# Patient Record
Sex: FEMALE | Race: BLACK OR AFRICAN AMERICAN | NOT HISPANIC OR LATINO | URBAN - METROPOLITAN AREA
[De-identification: names, ages, dates, MRNs, and addresses within clinical notes are randomized per-mention and may not be internally consistent; named-entity substitution may affect disease eponyms.]

---

## 2018-04-10 ENCOUNTER — INPATIENT (INPATIENT)
Facility: HOSPITAL | Age: 73
LOS: 2 days | Discharge: HOME | DRG: 247 | End: 2018-04-13
Attending: HOSPITALIST | Admitting: INTERNAL MEDICINE
Payer: COMMERCIAL

## 2018-04-10 ENCOUNTER — EMERGENCY (EMERGENCY)
Facility: HOSPITAL | Age: 73
LOS: 0 days | Discharge: ANOTHER HOSPITAL | End: 2018-04-10
Attending: EMERGENCY MEDICINE
Payer: MEDICARE

## 2018-04-10 VITALS
WEIGHT: 188.05 LBS | HEIGHT: 65 IN | DIASTOLIC BLOOD PRESSURE: 57 MMHG | RESPIRATION RATE: 17 BRPM | TEMPERATURE: 98 F | SYSTOLIC BLOOD PRESSURE: 122 MMHG | HEART RATE: 78 BPM | OXYGEN SATURATION: 99 %

## 2018-04-10 VITALS
SYSTOLIC BLOOD PRESSURE: 134 MMHG | RESPIRATION RATE: 28 BRPM | OXYGEN SATURATION: 100 % | HEART RATE: 72 BPM | DIASTOLIC BLOOD PRESSURE: 72 MMHG

## 2018-04-10 VITALS
TEMPERATURE: 98 F | DIASTOLIC BLOOD PRESSURE: 77 MMHG | OXYGEN SATURATION: 100 % | HEART RATE: 84 BPM | SYSTOLIC BLOOD PRESSURE: 147 MMHG | RESPIRATION RATE: 18 BRPM

## 2018-04-10 DIAGNOSIS — E11.9 TYPE 2 DIABETES MELLITUS WITHOUT COMPLICATIONS: ICD-10-CM

## 2018-04-10 DIAGNOSIS — Z98.891 HISTORY OF UTERINE SCAR FROM PREVIOUS SURGERY: Chronic | ICD-10-CM

## 2018-04-10 DIAGNOSIS — I10 ESSENTIAL (PRIMARY) HYPERTENSION: ICD-10-CM

## 2018-04-10 DIAGNOSIS — I24.9 ACUTE ISCHEMIC HEART DISEASE, UNSPECIFIED: ICD-10-CM

## 2018-04-10 DIAGNOSIS — E78.5 HYPERLIPIDEMIA, UNSPECIFIED: ICD-10-CM

## 2018-04-10 DIAGNOSIS — I25.10 ATHEROSCLEROTIC HEART DISEASE OF NATIVE CORONARY ARTERY WITHOUT ANGINA PECTORIS: ICD-10-CM

## 2018-04-10 DIAGNOSIS — R07.9 CHEST PAIN, UNSPECIFIED: ICD-10-CM

## 2018-04-10 LAB
ALBUMIN SERPL ELPH-MCNC: 3.8 G/DL — SIGNIFICANT CHANGE UP (ref 3.3–5)
ALBUMIN SERPL ELPH-MCNC: 4 G/DL — SIGNIFICANT CHANGE UP (ref 3.3–5)
ALP SERPL-CCNC: 103 U/L — SIGNIFICANT CHANGE UP (ref 40–120)
ALP SERPL-CCNC: 134 U/L — HIGH (ref 40–120)
ALT FLD-CCNC: 18 U/L RC — SIGNIFICANT CHANGE UP (ref 10–45)
ALT FLD-CCNC: 24 U/L — SIGNIFICANT CHANGE UP (ref 12–78)
ANION GAP SERPL CALC-SCNC: 14 MMOL/L — SIGNIFICANT CHANGE UP (ref 5–17)
ANION GAP SERPL CALC-SCNC: 9 MMOL/L — SIGNIFICANT CHANGE UP (ref 5–17)
APTT BLD: 27.8 SEC — SIGNIFICANT CHANGE UP (ref 27.5–37.4)
AST SERPL-CCNC: 31 U/L — SIGNIFICANT CHANGE UP (ref 10–40)
AST SERPL-CCNC: 39 U/L — HIGH (ref 15–37)
BASOPHILS # BLD AUTO: 0.05 K/UL — SIGNIFICANT CHANGE UP (ref 0–0.2)
BASOPHILS NFR BLD AUTO: 0.4 % — SIGNIFICANT CHANGE UP (ref 0–2)
BILIRUB SERPL-MCNC: 0.3 MG/DL — SIGNIFICANT CHANGE UP (ref 0.2–1.2)
BILIRUB SERPL-MCNC: 0.3 MG/DL — SIGNIFICANT CHANGE UP (ref 0.2–1.2)
BUN SERPL-MCNC: 22 MG/DL — SIGNIFICANT CHANGE UP (ref 7–23)
BUN SERPL-MCNC: 24 MG/DL — HIGH (ref 7–23)
CALCIUM SERPL-MCNC: 9.8 MG/DL — SIGNIFICANT CHANGE UP (ref 8.5–10.1)
CALCIUM SERPL-MCNC: 9.9 MG/DL — SIGNIFICANT CHANGE UP (ref 8.4–10.5)
CHLORIDE SERPL-SCNC: 104 MMOL/L — SIGNIFICANT CHANGE UP (ref 96–108)
CHLORIDE SERPL-SCNC: 99 MMOL/L — SIGNIFICANT CHANGE UP (ref 96–108)
CK MB BLD-MCNC: 2.5 % — SIGNIFICANT CHANGE UP (ref 0–3.5)
CK MB CFR SERPL CALC: 9.1 NG/ML — HIGH (ref 0.5–3.6)
CK SERPL-CCNC: 361 U/L — HIGH (ref 26–192)
CO2 SERPL-SCNC: 21 MMOL/L — LOW (ref 22–31)
CO2 SERPL-SCNC: 26 MMOL/L — SIGNIFICANT CHANGE UP (ref 22–31)
CREAT SERPL-MCNC: 0.89 MG/DL — SIGNIFICANT CHANGE UP (ref 0.5–1.3)
CREAT SERPL-MCNC: 1.13 MG/DL — SIGNIFICANT CHANGE UP (ref 0.5–1.3)
EOSINOPHIL # BLD AUTO: 0.06 K/UL — SIGNIFICANT CHANGE UP (ref 0–0.5)
EOSINOPHIL NFR BLD AUTO: 0.5 % — SIGNIFICANT CHANGE UP (ref 0–6)
GLUCOSE BLDC GLUCOMTR-MCNC: 231 MG/DL — HIGH (ref 70–99)
GLUCOSE BLDC GLUCOMTR-MCNC: 238 MG/DL — HIGH (ref 70–99)
GLUCOSE SERPL-MCNC: 268 MG/DL — HIGH (ref 70–99)
GLUCOSE SERPL-MCNC: 278 MG/DL — HIGH (ref 70–99)
HCT VFR BLD CALC: 33.9 % — LOW (ref 34.5–45)
HCT VFR BLD CALC: 38.1 % — SIGNIFICANT CHANGE UP (ref 34.5–45)
HGB BLD-MCNC: 11.1 G/DL — LOW (ref 11.5–15.5)
HGB BLD-MCNC: 12.1 G/DL — SIGNIFICANT CHANGE UP (ref 11.5–15.5)
IMM GRANULOCYTES NFR BLD AUTO: 0.3 % — SIGNIFICANT CHANGE UP (ref 0–1.5)
INR BLD: 0.93 RATIO — SIGNIFICANT CHANGE UP (ref 0.88–1.16)
LIDOCAIN IGE QN: 295 U/L — SIGNIFICANT CHANGE UP (ref 73–393)
LYMPHOCYTES # BLD AUTO: 16.5 % — SIGNIFICANT CHANGE UP (ref 13–44)
LYMPHOCYTES # BLD AUTO: 2.07 K/UL — SIGNIFICANT CHANGE UP (ref 1–3.3)
MAGNESIUM SERPL-MCNC: 1.8 MG/DL — SIGNIFICANT CHANGE UP (ref 1.6–2.6)
MAGNESIUM SERPL-MCNC: 1.9 MG/DL — SIGNIFICANT CHANGE UP (ref 1.6–2.6)
MCHC RBC-ENTMCNC: 26.9 PG — LOW (ref 27–34)
MCHC RBC-ENTMCNC: 28.5 PG — SIGNIFICANT CHANGE UP (ref 27–34)
MCHC RBC-ENTMCNC: 31.8 GM/DL — LOW (ref 32–36)
MCHC RBC-ENTMCNC: 32.7 GM/DL — SIGNIFICANT CHANGE UP (ref 32–36)
MCV RBC AUTO: 84.9 FL — SIGNIFICANT CHANGE UP (ref 80–100)
MCV RBC AUTO: 87.2 FL — SIGNIFICANT CHANGE UP (ref 80–100)
MONOCYTES # BLD AUTO: 0.48 K/UL — SIGNIFICANT CHANGE UP (ref 0–0.9)
MONOCYTES NFR BLD AUTO: 3.8 % — SIGNIFICANT CHANGE UP (ref 2–14)
NEUTROPHILS # BLD AUTO: 9.86 K/UL — HIGH (ref 1.8–7.4)
NEUTROPHILS NFR BLD AUTO: 78.5 % — HIGH (ref 43–77)
NRBC # BLD: 0 /100 WBCS — SIGNIFICANT CHANGE UP (ref 0–0)
NT-PROBNP SERPL-SCNC: 47 PG/ML — SIGNIFICANT CHANGE UP (ref 0–125)
PLATELET # BLD AUTO: 250 K/UL — SIGNIFICANT CHANGE UP (ref 150–400)
PLATELET # BLD AUTO: 319 K/UL — SIGNIFICANT CHANGE UP (ref 150–400)
POTASSIUM SERPL-MCNC: 3.5 MMOL/L — SIGNIFICANT CHANGE UP (ref 3.5–5.3)
POTASSIUM SERPL-MCNC: 4.3 MMOL/L — SIGNIFICANT CHANGE UP (ref 3.5–5.3)
POTASSIUM SERPL-SCNC: 3.5 MMOL/L — SIGNIFICANT CHANGE UP (ref 3.5–5.3)
POTASSIUM SERPL-SCNC: 4.3 MMOL/L — SIGNIFICANT CHANGE UP (ref 3.5–5.3)
PROT SERPL-MCNC: 6.7 G/DL — SIGNIFICANT CHANGE UP (ref 6–8.3)
PROT SERPL-MCNC: 8 GM/DL — SIGNIFICANT CHANGE UP (ref 6–8.3)
PROTHROM AB SERPL-ACNC: 10.1 SEC — SIGNIFICANT CHANGE UP (ref 9.8–12.7)
RBC # BLD: 3.89 M/UL — SIGNIFICANT CHANGE UP (ref 3.8–5.2)
RBC # BLD: 4.49 M/UL — SIGNIFICANT CHANGE UP (ref 3.8–5.2)
RBC # FLD: 13.3 % — SIGNIFICANT CHANGE UP (ref 10.3–14.5)
RBC # FLD: 15 % — HIGH (ref 10.3–14.5)
SODIUM SERPL-SCNC: 134 MMOL/L — LOW (ref 135–145)
SODIUM SERPL-SCNC: 139 MMOL/L — SIGNIFICANT CHANGE UP (ref 135–145)
TROPONIN I SERPL-MCNC: 0.72 NG/ML — HIGH (ref 0.01–0.04)
WBC # BLD: 10.8 K/UL — HIGH (ref 3.8–10.5)
WBC # BLD: 12.56 K/UL — HIGH (ref 3.8–10.5)
WBC # FLD AUTO: 10.8 K/UL — HIGH (ref 3.8–10.5)
WBC # FLD AUTO: 12.56 K/UL — HIGH (ref 3.8–10.5)

## 2018-04-10 PROCEDURE — 99152 MOD SED SAME PHYS/QHP 5/>YRS: CPT

## 2018-04-10 PROCEDURE — 92941 PRQ TRLML REVSC TOT OCCL AMI: CPT | Mod: LC

## 2018-04-10 PROCEDURE — 71045 X-RAY EXAM CHEST 1 VIEW: CPT | Mod: 26,59

## 2018-04-10 PROCEDURE — 93010 ELECTROCARDIOGRAM REPORT: CPT | Mod: 76

## 2018-04-10 PROCEDURE — 93458 L HRT ARTERY/VENTRICLE ANGIO: CPT | Mod: 26,59

## 2018-04-10 PROCEDURE — 99291 CRITICAL CARE FIRST HOUR: CPT

## 2018-04-10 RX ORDER — ATORVASTATIN CALCIUM 80 MG/1
80 TABLET, FILM COATED ORAL AT BEDTIME
Qty: 0 | Refills: 0 | Status: DISCONTINUED | OUTPATIENT
Start: 2018-04-10 | End: 2018-04-13

## 2018-04-10 RX ORDER — ASPIRIN/CALCIUM CARB/MAGNESIUM 324 MG
325 TABLET ORAL ONCE
Qty: 0 | Refills: 0 | Status: COMPLETED | OUTPATIENT
Start: 2018-04-10 | End: 2018-04-10

## 2018-04-10 RX ORDER — CLOPIDOGREL BISULFATE 75 MG/1
300 TABLET, FILM COATED ORAL ONCE
Qty: 0 | Refills: 0 | Status: COMPLETED | OUTPATIENT
Start: 2018-04-10 | End: 2018-04-10

## 2018-04-10 RX ORDER — DEXTROSE 50 % IN WATER 50 %
25 SYRINGE (ML) INTRAVENOUS ONCE
Qty: 0 | Refills: 0 | Status: DISCONTINUED | OUTPATIENT
Start: 2018-04-10 | End: 2018-04-11

## 2018-04-10 RX ORDER — DEXTROSE 50 % IN WATER 50 %
12.5 SYRINGE (ML) INTRAVENOUS ONCE
Qty: 0 | Refills: 0 | Status: DISCONTINUED | OUTPATIENT
Start: 2018-04-10 | End: 2018-04-11

## 2018-04-10 RX ORDER — HEPARIN SODIUM 5000 [USP'U]/ML
INJECTION INTRAVENOUS; SUBCUTANEOUS
Qty: 25000 | Refills: 0 | Status: DISCONTINUED | OUTPATIENT
Start: 2018-04-10 | End: 2018-04-10

## 2018-04-10 RX ORDER — GLUCAGON INJECTION, SOLUTION 0.5 MG/.1ML
1 INJECTION, SOLUTION SUBCUTANEOUS ONCE
Qty: 0 | Refills: 0 | Status: DISCONTINUED | OUTPATIENT
Start: 2018-04-10 | End: 2018-04-11

## 2018-04-10 RX ORDER — HEPARIN SODIUM 5000 [USP'U]/ML
4000 INJECTION INTRAVENOUS; SUBCUTANEOUS ONCE
Qty: 0 | Refills: 0 | Status: COMPLETED | OUTPATIENT
Start: 2018-04-10 | End: 2018-04-10

## 2018-04-10 RX ORDER — INSULIN LISPRO 100/ML
VIAL (ML) SUBCUTANEOUS AT BEDTIME
Qty: 0 | Refills: 0 | Status: DISCONTINUED | OUTPATIENT
Start: 2018-04-10 | End: 2018-04-11

## 2018-04-10 RX ORDER — DEXTROSE 50 % IN WATER 50 %
1 SYRINGE (ML) INTRAVENOUS ONCE
Qty: 0 | Refills: 0 | Status: DISCONTINUED | OUTPATIENT
Start: 2018-04-10 | End: 2018-04-11

## 2018-04-10 RX ORDER — TICAGRELOR 90 MG/1
90 TABLET ORAL
Qty: 0 | Refills: 0 | Status: DISCONTINUED | OUTPATIENT
Start: 2018-04-10 | End: 2018-04-13

## 2018-04-10 RX ORDER — HEPARIN SODIUM 5000 [USP'U]/ML
4000 INJECTION INTRAVENOUS; SUBCUTANEOUS EVERY 6 HOURS
Qty: 0 | Refills: 0 | Status: DISCONTINUED | OUTPATIENT
Start: 2018-04-10 | End: 2018-04-10

## 2018-04-10 RX ORDER — POTASSIUM CHLORIDE 20 MEQ
40 PACKET (EA) ORAL ONCE
Qty: 0 | Refills: 0 | Status: COMPLETED | OUTPATIENT
Start: 2018-04-10 | End: 2018-04-10

## 2018-04-10 RX ORDER — MAGNESIUM SULFATE 500 MG/ML
1 VIAL (ML) INJECTION ONCE
Qty: 0 | Refills: 0 | Status: COMPLETED | OUTPATIENT
Start: 2018-04-10 | End: 2018-04-10

## 2018-04-10 RX ORDER — SODIUM CHLORIDE 9 MG/ML
1000 INJECTION, SOLUTION INTRAVENOUS
Qty: 0 | Refills: 0 | Status: DISCONTINUED | OUTPATIENT
Start: 2018-04-10 | End: 2018-04-11

## 2018-04-10 RX ORDER — ASPIRIN/CALCIUM CARB/MAGNESIUM 324 MG
81 TABLET ORAL DAILY
Qty: 0 | Refills: 0 | Status: DISCONTINUED | OUTPATIENT
Start: 2018-04-10 | End: 2018-04-13

## 2018-04-10 RX ORDER — INSULIN LISPRO 100/ML
VIAL (ML) SUBCUTANEOUS
Qty: 0 | Refills: 0 | Status: DISCONTINUED | OUTPATIENT
Start: 2018-04-10 | End: 2018-04-11

## 2018-04-10 RX ADMIN — Medication 100 GRAM(S): at 23:13

## 2018-04-10 RX ADMIN — CLOPIDOGREL BISULFATE 300 MILLIGRAM(S): 75 TABLET, FILM COATED ORAL at 17:54

## 2018-04-10 RX ADMIN — Medication 40 MILLIEQUIVALENT(S): at 23:13

## 2018-04-10 RX ADMIN — HEPARIN SODIUM 1000 UNIT(S)/HR: 5000 INJECTION INTRAVENOUS; SUBCUTANEOUS at 18:17

## 2018-04-10 RX ADMIN — ATORVASTATIN CALCIUM 80 MILLIGRAM(S): 80 TABLET, FILM COATED ORAL at 23:13

## 2018-04-10 RX ADMIN — HEPARIN SODIUM 4000 UNIT(S): 5000 INJECTION INTRAVENOUS; SUBCUTANEOUS at 18:17

## 2018-04-10 RX ADMIN — Medication 325 MILLIGRAM(S): at 17:55

## 2018-04-10 NOTE — H&P ADULT - NSHPSOCIALHISTORY_GEN_ALL_CORE
Pt lives at home with , no small children living at home, no pets  Denies smoking, drinks alcohol occasionally, denies drug use.

## 2018-04-10 NOTE — H&P ADULT - PMH
Controlled type 2 diabetes mellitus without complication, without long-term current use of insulin    Essential hypertension    Hyperlipidemia associated with type 2 diabetes mellitus

## 2018-04-10 NOTE — H&P ADULT - ASSESSMENT
72F PMH HTN, HLDL, DMII presenting with acute STEMI now s/p PCI and EDWIGE in OM1    # Neuro  - Pt with no acute neuro issues    # Cards  - c/w asa and ticagrelor  - Atorvastatin started  - pt takes atenolol at home, will monitor and add bb  - Pt has history of high bp, currently SBP in 120s.  Pt takes valsartan at home, will add BB or captopril if needed  - Bedrest for now, pt can sit up later tonight.    # Pulm  - No acute issues    # GI  - DASH diet    #   - Voiding well, will monitor UOP    # ID  - No s/s of infection    # Heme  - On DAPT  - Will add SQH   # Endo  - Pt with history of DMII, taking metformin and glimiperide at home  - Will hold oral agents in hospital  - ISS    # DVT ppx  - Will add SQH

## 2018-04-10 NOTE — H&P ADULT - NSHPPHYSICALEXAM_GEN_ALL_CORE
Appearance: Sitting in bed, NAD  HEENT:   Normal oral mucosa, PERRL, EOMI, anicteric sclera  Cardiovascular: RRR, No murmurs, gallops or rubs appreciated  Respiratory: Lungs clear to auscultation bilaterally, no wheezes, crackles appreciated  Gastrointestinal:  Soft, nondistended, nontender, +BS	  Skin: No rashes, eccymosis or cyanosis noted	  Neurologic: AOx3, CNII-XII grossly intact, motor and sensory function grossly intact  Extremities: Moving all extremities equally, 1+ pitting edema bilaterally in LE  Vascular: palpable dp, pt and radial pulses 2+ bilaterally  Psych:  Normal mood and affect, responds to questions appropriately

## 2018-04-10 NOTE — ED PROVIDER NOTE - OBJECTIVE STATEMENT
Pt is a 71 yo lady with a pmhx of HTN, HL, NIDM who presents to the ED with chest pain. It started at 1 PM after she ate, and she had 1 episode of vomiting. Chest pain resolved afterwards, lasted for 1 hour. Now, is 0/10 chest pain. No sob, no numbness or tingling, no cough, no fevers. No hx of dvt/pe in the past. Says she had a stress test in February by her cardiologist as routine screening. Pt says it was reportedly normal, but scheduled for follow up in June.

## 2018-04-10 NOTE — CHART NOTE - NSCHARTNOTEFT_GEN_A_CORE
FEMORAL ANGIOSEAL ASSESSMENT NOTE    Right groin angioseal in place with good hemostatis w/o any bleeding or hematoma.  Pulses in the RIGHT lower extremity are palpable.   Right groin is soft/non tender  Patient denies leg, foot  or chest pain  post 12 lead EKG WNL, no ST or T wave changes noted when compared to pre procedural EKG    Complications: None    Comments: patient is to remain bed rest for the next 2 hours.

## 2018-04-10 NOTE — ED PROVIDER NOTE - INTERPRETATION
st elevation sub millimeter in v1, st depression in V2. twave inversion in avL mild st elevation in avR

## 2018-04-10 NOTE — H&P ADULT - HISTORY OF PRESENT ILLNESS
72F PMH HTN, HLD, DMII presented with chest pain to [facility] earlier today and was transferred the [facility] for cath.  Pt states pain started around 1 pm.  Pt went downstairs to remove laundry from dryer and started to feel pain in chest.  Patient states she came upstairs and pain got worse.  Pain was in the center of her chest and felt like a squeezing sensation.  Patient denies any radiation of the pain at this time.  Patient states she went to lie down upstairs as the pain progressively was getting worse.  After laying down, patient states pain continued to worsen, she had emesis x 2 and called EMS who brought her to [place].    At [place] per report, patient had troponins and STEMI on EKG (reported).  Patient loaded with asa, plavix, heparin and started on hep gtt.  She was transferred to [facility] for cath.  Patient was given brilinta and taken for RFA angio with mild LAD and RCA disease noted and 99% occlusion noted in OM1.  Pt had stent placed in OM1.    After, pt was transferred to CCU for further care.  At time of exam, patient had no further complaints.  Denied cp, f/c/n/v at this time.

## 2018-04-10 NOTE — H&P ADULT - NSHPREVIEWOFSYSTEMS_GEN_ALL_CORE
REVIEW OF SYSTEMS:    CONSTITUTIONAL: No weakness, fevers or chills  EYES/ENT: No visual changes;  No vertigo or throat pain   NECK: No pain or stiffness  RESPIRATORY: No cough, wheezing, hemoptysis; No shortness of breath  CARDIOVASCULAR: No chest pain or palpitations  GASTROINTESTINAL: No abdominal or epigastric pain. No nausea, vomiting, or hematemesis; No diarrhea or constipation.  GENITOURINARY: No dysuria, frequency or hematuria  NEUROLOGICAL: No numbness or weakness  SKIN: No itching, rashes

## 2018-04-10 NOTE — ED PROVIDER NOTE - MEDICAL DECISION MAKING DETAILS
Ddx: ACS, heart score 4  Plan: cbc, cmp, ecg, cxr, pt is smiling, pain free now, cardiology consult reassess, admission vs transfer Ddx: ACS, heart score 4, ECG concerning for STEMI  Plan: cbc, cmp, ecg, cxr, pt is smiling, pain free now, cardiology consult reassess, admission vs transfer Ddx: ACS, heart score 4, ECG concerning for STEMI/ no pleuritic pain or radiation to suggest dissection or pe.  Plan: cbc, cmp, ecg, cxr, pt is smiling, pain free now, cardiology consult reassess, admission vs transfer

## 2018-04-10 NOTE — CHART NOTE - NSCHARTNOTEFT_GEN_A_CORE
pt s/p LHC via RFA, pt w/ angioseal. RN noted superficial oozing from site. RN then noted small hematoma. Pressure held @ site x 5 minutes, groin now slightly ecchymotic but soft, no bleeding, bruising or hematoma noted. Neurovascularly intact. Sandbag applied. Will continue to monitor groin closely     Berenice Avila [facility]/CCU #[id number]

## 2018-04-10 NOTE — ED PROVIDER NOTE - PROGRESS NOTE DETAILS
Patient had elevated troponin and concerning ECG. Pt denies chest pain currently. Pt given aspirin, plavix, and heparin. Transfer accepted by cardiologist Dr. Montez to [place].

## 2018-04-11 LAB
ALBUMIN SERPL ELPH-MCNC: 3.7 G/DL — SIGNIFICANT CHANGE UP (ref 3.3–5)
ALBUMIN SERPL ELPH-MCNC: 3.7 G/DL — SIGNIFICANT CHANGE UP (ref 3.3–5)
ALP SERPL-CCNC: 106 U/L — SIGNIFICANT CHANGE UP (ref 40–120)
ALP SERPL-CCNC: 108 U/L — SIGNIFICANT CHANGE UP (ref 40–120)
ALT FLD-CCNC: 28 U/L RC — SIGNIFICANT CHANGE UP (ref 10–45)
ALT FLD-CCNC: 28 U/L RC — SIGNIFICANT CHANGE UP (ref 10–45)
ANION GAP SERPL CALC-SCNC: 13 MMOL/L — SIGNIFICANT CHANGE UP (ref 5–17)
ANION GAP SERPL CALC-SCNC: 13 MMOL/L — SIGNIFICANT CHANGE UP (ref 5–17)
AST SERPL-CCNC: 145 U/L — HIGH (ref 10–40)
AST SERPL-CCNC: 156 U/L — HIGH (ref 10–40)
BASOPHILS # BLD AUTO: 0 K/UL — SIGNIFICANT CHANGE UP (ref 0–0.2)
BASOPHILS # BLD AUTO: 0.1 K/UL — SIGNIFICANT CHANGE UP (ref 0–0.2)
BASOPHILS NFR BLD AUTO: 0.4 % — SIGNIFICANT CHANGE UP (ref 0–2)
BASOPHILS NFR BLD AUTO: 0.6 % — SIGNIFICANT CHANGE UP (ref 0–2)
BILIRUB SERPL-MCNC: 0.5 MG/DL — SIGNIFICANT CHANGE UP (ref 0.2–1.2)
BILIRUB SERPL-MCNC: 0.6 MG/DL — SIGNIFICANT CHANGE UP (ref 0.2–1.2)
BLD GP AB SCN SERPL QL: NEGATIVE — SIGNIFICANT CHANGE UP
BUN SERPL-MCNC: 15 MG/DL — SIGNIFICANT CHANGE UP (ref 7–23)
BUN SERPL-MCNC: 17 MG/DL — SIGNIFICANT CHANGE UP (ref 7–23)
CALCIUM SERPL-MCNC: 10.3 MG/DL — SIGNIFICANT CHANGE UP (ref 8.4–10.5)
CALCIUM SERPL-MCNC: 10.3 MG/DL — SIGNIFICANT CHANGE UP (ref 8.4–10.5)
CHLORIDE SERPL-SCNC: 103 MMOL/L — SIGNIFICANT CHANGE UP (ref 96–108)
CHLORIDE SERPL-SCNC: 104 MMOL/L — SIGNIFICANT CHANGE UP (ref 96–108)
CHOLEST SERPL-MCNC: 138 MG/DL — SIGNIFICANT CHANGE UP (ref 10–199)
CHOLEST SERPL-MCNC: 161 MG/DL — SIGNIFICANT CHANGE UP (ref 10–199)
CK MB BLD-MCNC: 6.2 % — HIGH (ref 0–3.5)
CK MB BLD-MCNC: 6.7 % — HIGH (ref 0–3.5)
CK MB CFR SERPL CALC: 112.8 NG/ML — HIGH (ref 0–3.8)
CK MB CFR SERPL CALC: 82.9 NG/ML — HIGH (ref 0–3.8)
CK SERPL-CCNC: 1331 U/L — HIGH (ref 25–170)
CK SERPL-CCNC: 1677 U/L — HIGH (ref 25–170)
CO2 SERPL-SCNC: 22 MMOL/L — SIGNIFICANT CHANGE UP (ref 22–31)
CO2 SERPL-SCNC: 22 MMOL/L — SIGNIFICANT CHANGE UP (ref 22–31)
CREAT SERPL-MCNC: 0.78 MG/DL — SIGNIFICANT CHANGE UP (ref 0.5–1.3)
CREAT SERPL-MCNC: 0.85 MG/DL — SIGNIFICANT CHANGE UP (ref 0.5–1.3)
EOSINOPHIL # BLD AUTO: 0 K/UL — SIGNIFICANT CHANGE UP (ref 0–0.5)
EOSINOPHIL # BLD AUTO: 0.1 K/UL — SIGNIFICANT CHANGE UP (ref 0–0.5)
EOSINOPHIL NFR BLD AUTO: 0.2 % — SIGNIFICANT CHANGE UP (ref 0–6)
EOSINOPHIL NFR BLD AUTO: 0.7 % — SIGNIFICANT CHANGE UP (ref 0–6)
GLUCOSE BLDC GLUCOMTR-MCNC: 155 MG/DL — HIGH (ref 70–99)
GLUCOSE BLDC GLUCOMTR-MCNC: 243 MG/DL — HIGH (ref 70–99)
GLUCOSE BLDC GLUCOMTR-MCNC: 247 MG/DL — HIGH (ref 70–99)
GLUCOSE BLDC GLUCOMTR-MCNC: 297 MG/DL — HIGH (ref 70–99)
GLUCOSE SERPL-MCNC: 215 MG/DL — HIGH (ref 70–99)
GLUCOSE SERPL-MCNC: 295 MG/DL — HIGH (ref 70–99)
HBA1C BLD-MCNC: 11.1 % — HIGH (ref 4–5.6)
HCT VFR BLD CALC: 37.7 % — SIGNIFICANT CHANGE UP (ref 34.5–45)
HCT VFR BLD CALC: 38.6 % — SIGNIFICANT CHANGE UP (ref 34.5–45)
HDLC SERPL-MCNC: 44 MG/DL — SIGNIFICANT CHANGE UP (ref 40–125)
HDLC SERPL-MCNC: 46 MG/DL — SIGNIFICANT CHANGE UP (ref 40–125)
HGB BLD-MCNC: 12.1 G/DL — SIGNIFICANT CHANGE UP (ref 11.5–15.5)
HGB BLD-MCNC: 12.4 G/DL — SIGNIFICANT CHANGE UP (ref 11.5–15.5)
LIPID PNL WITH DIRECT LDL SERPL: 83 MG/DL — SIGNIFICANT CHANGE UP
LIPID PNL WITH DIRECT LDL SERPL: 88 MG/DL — SIGNIFICANT CHANGE UP
LYMPHOCYTES # BLD AUTO: 1.7 K/UL — SIGNIFICANT CHANGE UP (ref 1–3.3)
LYMPHOCYTES # BLD AUTO: 1.8 K/UL — SIGNIFICANT CHANGE UP (ref 1–3.3)
LYMPHOCYTES # BLD AUTO: 15 % — SIGNIFICANT CHANGE UP (ref 13–44)
LYMPHOCYTES # BLD AUTO: 17 % — SIGNIFICANT CHANGE UP (ref 13–44)
MAGNESIUM SERPL-MCNC: 1.9 MG/DL — SIGNIFICANT CHANGE UP (ref 1.6–2.6)
MAGNESIUM SERPL-MCNC: 2.1 MG/DL — SIGNIFICANT CHANGE UP (ref 1.6–2.6)
MCHC RBC-ENTMCNC: 27.9 PG — SIGNIFICANT CHANGE UP (ref 27–34)
MCHC RBC-ENTMCNC: 28 PG — SIGNIFICANT CHANGE UP (ref 27–34)
MCHC RBC-ENTMCNC: 32.1 GM/DL — SIGNIFICANT CHANGE UP (ref 32–36)
MCHC RBC-ENTMCNC: 32.1 GM/DL — SIGNIFICANT CHANGE UP (ref 32–36)
MCV RBC AUTO: 86.9 FL — SIGNIFICANT CHANGE UP (ref 80–100)
MCV RBC AUTO: 87.2 FL — SIGNIFICANT CHANGE UP (ref 80–100)
MONOCYTES # BLD AUTO: 0.6 K/UL — SIGNIFICANT CHANGE UP (ref 0–0.9)
MONOCYTES # BLD AUTO: 0.7 K/UL — SIGNIFICANT CHANGE UP (ref 0–0.9)
MONOCYTES NFR BLD AUTO: 5.8 % — SIGNIFICANT CHANGE UP (ref 2–14)
MONOCYTES NFR BLD AUTO: 6.2 % — SIGNIFICANT CHANGE UP (ref 2–14)
NEUTROPHILS # BLD AUTO: 8 K/UL — HIGH (ref 1.8–7.4)
NEUTROPHILS # BLD AUTO: 8.8 K/UL — HIGH (ref 1.8–7.4)
NEUTROPHILS NFR BLD AUTO: 76.5 % — SIGNIFICANT CHANGE UP (ref 43–77)
NEUTROPHILS NFR BLD AUTO: 77.7 % — HIGH (ref 43–77)
PHOSPHATE SERPL-MCNC: 1.8 MG/DL — LOW (ref 2.5–4.5)
PHOSPHATE SERPL-MCNC: 2.9 MG/DL — SIGNIFICANT CHANGE UP (ref 2.5–4.5)
PLATELET # BLD AUTO: 276 K/UL — SIGNIFICANT CHANGE UP (ref 150–400)
PLATELET # BLD AUTO: 282 K/UL — SIGNIFICANT CHANGE UP (ref 150–400)
POTASSIUM SERPL-MCNC: 3.8 MMOL/L — SIGNIFICANT CHANGE UP (ref 3.5–5.3)
POTASSIUM SERPL-MCNC: 4.1 MMOL/L — SIGNIFICANT CHANGE UP (ref 3.5–5.3)
POTASSIUM SERPL-SCNC: 3.8 MMOL/L — SIGNIFICANT CHANGE UP (ref 3.5–5.3)
POTASSIUM SERPL-SCNC: 4.1 MMOL/L — SIGNIFICANT CHANGE UP (ref 3.5–5.3)
PROT SERPL-MCNC: 6.9 G/DL — SIGNIFICANT CHANGE UP (ref 6–8.3)
PROT SERPL-MCNC: 7.1 G/DL — SIGNIFICANT CHANGE UP (ref 6–8.3)
RBC # BLD: 4.34 M/UL — SIGNIFICANT CHANGE UP (ref 3.8–5.2)
RBC # BLD: 4.42 M/UL — SIGNIFICANT CHANGE UP (ref 3.8–5.2)
RBC # FLD: 13.4 % — SIGNIFICANT CHANGE UP (ref 10.3–14.5)
RBC # FLD: 13.4 % — SIGNIFICANT CHANGE UP (ref 10.3–14.5)
RH IG SCN BLD-IMP: NEGATIVE — SIGNIFICANT CHANGE UP
SODIUM SERPL-SCNC: 138 MMOL/L — SIGNIFICANT CHANGE UP (ref 135–145)
SODIUM SERPL-SCNC: 139 MMOL/L — SIGNIFICANT CHANGE UP (ref 135–145)
TOTAL CHOLESTEROL/HDL RATIO MEASUREMENT: 3 RATIO — LOW (ref 3.3–7.1)
TOTAL CHOLESTEROL/HDL RATIO MEASUREMENT: 3.7 RATIO — SIGNIFICANT CHANGE UP (ref 3.3–7.1)
TRIGL SERPL-MCNC: 144 MG/DL — SIGNIFICANT CHANGE UP (ref 10–149)
TRIGL SERPL-MCNC: 47 MG/DL — SIGNIFICANT CHANGE UP (ref 10–149)
TROPONIN T SERPL-MCNC: 3.65 NG/ML — HIGH (ref 0–0.06)
TROPONIN T SERPL-MCNC: 5.04 NG/ML — HIGH (ref 0–0.06)
TSH SERPL-MCNC: 1.78 UIU/ML — SIGNIFICANT CHANGE UP (ref 0.27–4.2)
TSH SERPL-MCNC: 2.43 UIU/ML — SIGNIFICANT CHANGE UP (ref 0.27–4.2)
WBC # BLD: 10.5 K/UL — SIGNIFICANT CHANGE UP (ref 3.8–10.5)
WBC # BLD: 11.4 K/UL — HIGH (ref 3.8–10.5)
WBC # FLD AUTO: 10.5 K/UL — SIGNIFICANT CHANGE UP (ref 3.8–10.5)
WBC # FLD AUTO: 11.4 K/UL — HIGH (ref 3.8–10.5)

## 2018-04-11 PROCEDURE — 99233 SBSQ HOSP IP/OBS HIGH 50: CPT | Mod: GC

## 2018-04-11 PROCEDURE — 93010 ELECTROCARDIOGRAM REPORT: CPT

## 2018-04-11 RX ORDER — INSULIN LISPRO 100/ML
VIAL (ML) SUBCUTANEOUS AT BEDTIME
Qty: 0 | Refills: 0 | Status: DISCONTINUED | OUTPATIENT
Start: 2018-04-11 | End: 2018-04-12

## 2018-04-11 RX ORDER — NYSTATIN CREAM 100000 [USP'U]/G
1 CREAM TOPICAL
Qty: 0 | Refills: 0 | Status: DISCONTINUED | OUTPATIENT
Start: 2018-04-11 | End: 2018-04-13

## 2018-04-11 RX ORDER — DEXTROSE 50 % IN WATER 50 %
1 SYRINGE (ML) INTRAVENOUS ONCE
Qty: 0 | Refills: 0 | Status: DISCONTINUED | OUTPATIENT
Start: 2018-04-11 | End: 2018-04-13

## 2018-04-11 RX ORDER — POTASSIUM PHOSPHATE, MONOBASIC POTASSIUM PHOSPHATE, DIBASIC 236; 224 MG/ML; MG/ML
15 INJECTION, SOLUTION INTRAVENOUS ONCE
Qty: 0 | Refills: 0 | Status: COMPLETED | OUTPATIENT
Start: 2018-04-11 | End: 2018-04-11

## 2018-04-11 RX ORDER — SODIUM CHLORIDE 9 MG/ML
1000 INJECTION, SOLUTION INTRAVENOUS
Qty: 0 | Refills: 0 | Status: DISCONTINUED | OUTPATIENT
Start: 2018-04-11 | End: 2018-04-13

## 2018-04-11 RX ORDER — DEXTROSE 50 % IN WATER 50 %
12.5 SYRINGE (ML) INTRAVENOUS ONCE
Qty: 0 | Refills: 0 | Status: DISCONTINUED | OUTPATIENT
Start: 2018-04-11 | End: 2018-04-13

## 2018-04-11 RX ORDER — DEXTROSE 50 % IN WATER 50 %
25 SYRINGE (ML) INTRAVENOUS ONCE
Qty: 0 | Refills: 0 | Status: DISCONTINUED | OUTPATIENT
Start: 2018-04-11 | End: 2018-04-13

## 2018-04-11 RX ORDER — GLUCAGON INJECTION, SOLUTION 0.5 MG/.1ML
1 INJECTION, SOLUTION SUBCUTANEOUS ONCE
Qty: 0 | Refills: 0 | Status: DISCONTINUED | OUTPATIENT
Start: 2018-04-11 | End: 2018-04-13

## 2018-04-11 RX ORDER — METOPROLOL TARTRATE 50 MG
12.5 TABLET ORAL
Qty: 0 | Refills: 0 | Status: DISCONTINUED | OUTPATIENT
Start: 2018-04-11 | End: 2018-04-13

## 2018-04-11 RX ORDER — INSULIN LISPRO 100/ML
VIAL (ML) SUBCUTANEOUS
Qty: 0 | Refills: 0 | Status: DISCONTINUED | OUTPATIENT
Start: 2018-04-11 | End: 2018-04-12

## 2018-04-11 RX ORDER — FUROSEMIDE 40 MG
20 TABLET ORAL ONCE
Qty: 0 | Refills: 0 | Status: COMPLETED | OUTPATIENT
Start: 2018-04-11 | End: 2018-04-11

## 2018-04-11 RX ORDER — HEPARIN SODIUM 5000 [USP'U]/ML
5000 INJECTION INTRAVENOUS; SUBCUTANEOUS EVERY 8 HOURS
Qty: 0 | Refills: 0 | Status: DISCONTINUED | OUTPATIENT
Start: 2018-04-11 | End: 2018-04-13

## 2018-04-11 RX ORDER — MAGNESIUM SULFATE 500 MG/ML
1 VIAL (ML) INJECTION ONCE
Qty: 0 | Refills: 0 | Status: COMPLETED | OUTPATIENT
Start: 2018-04-11 | End: 2018-04-11

## 2018-04-11 RX ADMIN — Medication 100 GRAM(S): at 13:44

## 2018-04-11 RX ADMIN — Medication 1: at 08:24

## 2018-04-11 RX ADMIN — ATORVASTATIN CALCIUM 80 MILLIGRAM(S): 80 TABLET, FILM COATED ORAL at 21:37

## 2018-04-11 RX ADMIN — Medication 81 MILLIGRAM(S): at 12:32

## 2018-04-11 RX ADMIN — Medication 20 MILLIGRAM(S): at 11:01

## 2018-04-11 RX ADMIN — Medication 2: at 12:32

## 2018-04-11 RX ADMIN — TICAGRELOR 90 MILLIGRAM(S): 90 TABLET ORAL at 05:18

## 2018-04-11 RX ADMIN — NYSTATIN CREAM 1 APPLICATION(S): 100000 CREAM TOPICAL at 17:46

## 2018-04-11 RX ADMIN — HEPARIN SODIUM 5000 UNIT(S): 5000 INJECTION INTRAVENOUS; SUBCUTANEOUS at 21:37

## 2018-04-11 RX ADMIN — Medication 12.5 MILLIGRAM(S): at 17:46

## 2018-04-11 RX ADMIN — TICAGRELOR 90 MILLIGRAM(S): 90 TABLET ORAL at 17:46

## 2018-04-11 RX ADMIN — Medication 1: at 21:44

## 2018-04-11 RX ADMIN — Medication 2: at 17:34

## 2018-04-11 RX ADMIN — POTASSIUM PHOSPHATE, MONOBASIC POTASSIUM PHOSPHATE, DIBASIC 62.5 MILLIMOLE(S): 236; 224 INJECTION, SOLUTION INTRAVENOUS at 05:18

## 2018-04-11 NOTE — PROGRESS NOTE ADULT - ATTENDING COMMENTS
Patient seen and examined. Agree with assessment and plan as outlined above. 73 yo F with OM1 infarct s/p PCI with EDWIGE to OM1 with EF~40%. Patient on aspirin, brilinta, atorvastatin. Start low dose metoprolol. Lasix 20 mg IV x 1 for LVEDP=30 mmHg. Continue CCU care.

## 2018-04-11 NOTE — CHART NOTE - NSCHARTNOTEFT_GEN_A_CORE
CCU Transfer Note    Transfer from: CCU    Transfer to: (  ) Medicine    ( x ) Telemetry     (   ) RCU        (    ) Palliative         (   ) Stroke Unit       (  ) MICU   (   ) __________________    Accepting Physician: Dr. Mathis    CCU COURSE:    72F PMH HTN, HLD, DMII presented with chest pain to [facility] on 4/10/18 and was transferred the [facility] for cath.  Pt stated pain started around 1 pm on 4/10.  Pt went downstairs to remove laundry from dryer and started to feel pain in chest.  Patient stated she came upstairs and pain got worse.  Pain was in the center of her chest and felt like a squeezing sensation.  Patient denied any radiation of the pain at this time.  Patient stated she went to lie down upstairs as the pain progressively was getting worse.  After laying down, patient stated pain continued to worsen, she had emesis x 2 and called EMS who brought her to [place].    At [place] per report, patient had troponins and STEMI on EKG (reported).  Patient loaded with asa, plavix, heparin and started on hep gtt.  She was transferred to [facility] for cath.  Patient was given brilinta and taken for RFA angio with mild LAD and RCA disease noted and 99% occlusion noted in OM1.  Pt had stent placed in OM1.    After, pt was transferred to CCU for further care.  Patient recovered in CCU on 4/11.  Patient was started on metoprolol on 4/11 and given one dose of lasix.  Patient remained HD stable and was appropriate for transfer later on 4/11.    ASSESSMENT & PLAN:     72F PMH HTN, HLDL, DMII presenting with acute STEMI now s/p PCI and EDWIGE in OM1 now no longer with chest pain, started on ASA, Plavix, metoprolol.      - c/w ASA 81 and ticagrelor 90 BID until follow up with cardiology outpatient.  - Atorvastatin 80 mg qHS added  - now started on low dose beta-blocker, metoprolol.  Should continue.  - Patient will need to restart home DM meds on discharge.  - Patient will need TTE prior to discharge      FOR FOLLOW UP:  [ ] f/u TTE  [ ] f/u with primary care physician  [ ] referral to cardiologist.      CCU [id number]

## 2018-04-11 NOTE — PROGRESS NOTE ADULT - SUBJECTIVE AND OBJECTIVE BOX
CONTACT INFO:  Shila Jasmine MD, PhD  PGY-1| Internal Medicine  [phone] / [id number]    HPI / INTERVAL HISTORY:  Patient seen and examined at bedside.  Denies further pain or emesis today.  Pt stats she feels better.    REVIEW OF SYSTEMS:    CONSTITUTIONAL: No weakness, fevers or chills  EYES/ENT: No visual changes;  No vertigo or throat pain   NECK: No pain or stiffness  RESPIRATORY: No cough, wheezing, hemoptysis; No shortness of breath  CARDIOVASCULAR: No chest pain or palpitations  GASTROINTESTINAL: No abdominal or epigastric pain. No nausea, vomiting, or hematemesis; No diarrhea or constipation. No melena or hematochezia.  GENITOURINARY: No dysuria, frequency or hematuria  NEUROLOGICAL: No numbness or weakness  SKIN: No itching, rashes      OBJECTIVE:  VITAL SIGNS:  ICU Vital Signs Last 24 Hrs  T(C): 36.8 (11 Apr 2018 07:00), Max: 36.8 (10 Apr 2018 23:00)  T(F): 98.2 (11 Apr 2018 07:00), Max: 98.2 (10 Apr 2018 23:00)  HR: 62 (11 Apr 2018 08:00) (58 - 72)  BP: 98/59 (11 Apr 2018 08:00) (94/51 - 134/72)  BP(mean): 69 (11 Apr 2018 08:00) (65 - 93)  ABP: --  ABP(mean): --  RR: 22 (11 Apr 2018 08:00) (18 - 35)  SpO2: 97% (11 Apr 2018 08:00) (96% - 100%)        04-10 @ 07:01  -  04-11 @ 07:00  --------------------------------------------------------  IN: 465 mL / OUT: 950 mL / NET: -485 mL      CAPILLARY BLOOD GLUCOSE      POCT Blood Glucose.: 155 mg/dL (11 Apr 2018 08:11)      PHYSICAL EXAM:  Gen: Laying in bed, AND  HEENT: NC/AT; PERRL, anicteric sclera  Neck: supple  Resp: clear to ausculation B/L; no wheezes, rales or rhonchi  Cardiovasc: S1S2 normal; RRR; no murmurs, rubs or gallops  GI: soft, nondistended, nontender; +BS  Extr: warm, well-perfused, PT/DP pulses 2+ B/L; no LE edema  Skin: normal color and turgor  Neuro: AOx3    LABS:                        12.1   11.4  )-----------( 282      ( 11 Apr 2018 02:52 )             37.7     04-11    138  |  103  |  17  ----------------------------<  215<H>  3.8   |  22  |  0.85    Ca    10.3      11 Apr 2018 02:52  Phos  1.8     04-11  Mg     2.1     04-11    TPro  7.1  /  Alb  3.7  /  TBili  0.5  /  DBili  x   /  AST  156<H>  /  ALT  28  /  AlkPhos  108  04-11    LIVER FUNCTIONS - ( 11 Apr 2018 02:52 )  Alb: 3.7 g/dL / Pro: 7.1 g/dL / ALK PHOS: 108 U/L / ALT: 28 U/L RC / AST: 156 U/L / GGT: x               CARDIAC MARKERS ( 11 Apr 2018 02:52 )  x     / 5.04 ng/mL / 1677 U/L / x     / 112.8 ng/mL          RADIOLOGY & ADDITIONAL TESTS:       MEDICATIONS:  aspirin enteric coated 81 milliGRAM(s) Oral daily  atorvastatin 80 milliGRAM(s) Oral at bedtime  dextrose 5%. 1000 milliLiter(s) IV Continuous <Continuous>  dextrose 50% Injectable 12.5 Gram(s) IV Push once  dextrose 50% Injectable 25 Gram(s) IV Push once  dextrose 50% Injectable 25 Gram(s) IV Push once  dextrose Gel 1 Dose(s) Oral once PRN  glucagon  Injectable 1 milliGRAM(s) IntraMuscular once PRN  heparin  Injectable 5000 Unit(s) SubCutaneous every 8 hours  insulin lispro (HumaLOG) corrective regimen sliding scale   SubCutaneous three times a day before meals  insulin lispro (HumaLOG) corrective regimen sliding scale   SubCutaneous at bedtime  nystatin Powder 1 Application(s) Topical two times a day  ticagrelor 90 milliGRAM(s) Oral two times a day      ALLERGIES:  No Known Allergies

## 2018-04-12 ENCOUNTER — TRANSCRIPTION ENCOUNTER (OUTPATIENT)
Age: 73
End: 2018-04-12

## 2018-04-12 DIAGNOSIS — I21.29 ST ELEVATION (STEMI) MYOCARDIAL INFARCTION INVOLVING OTHER SITES: ICD-10-CM

## 2018-04-12 DIAGNOSIS — Z29.9 ENCOUNTER FOR PROPHYLACTIC MEASURES, UNSPECIFIED: ICD-10-CM

## 2018-04-12 DIAGNOSIS — E11.65 TYPE 2 DIABETES MELLITUS WITH HYPERGLYCEMIA: ICD-10-CM

## 2018-04-12 DIAGNOSIS — I10 ESSENTIAL (PRIMARY) HYPERTENSION: ICD-10-CM

## 2018-04-12 DIAGNOSIS — E78.5 HYPERLIPIDEMIA, UNSPECIFIED: ICD-10-CM

## 2018-04-12 LAB
ANION GAP SERPL CALC-SCNC: 14 MMOL/L — SIGNIFICANT CHANGE UP (ref 5–17)
BASOPHILS # BLD AUTO: 0.1 K/UL — SIGNIFICANT CHANGE UP (ref 0–0.2)
BASOPHILS NFR BLD AUTO: 0.5 % — SIGNIFICANT CHANGE UP (ref 0–2)
BUN SERPL-MCNC: 16 MG/DL — SIGNIFICANT CHANGE UP (ref 7–23)
CALCIUM SERPL-MCNC: 10.4 MG/DL — SIGNIFICANT CHANGE UP (ref 8.4–10.5)
CHLORIDE SERPL-SCNC: 101 MMOL/L — SIGNIFICANT CHANGE UP (ref 96–108)
CO2 SERPL-SCNC: 22 MMOL/L — SIGNIFICANT CHANGE UP (ref 22–31)
CREAT SERPL-MCNC: 1.07 MG/DL — SIGNIFICANT CHANGE UP (ref 0.5–1.3)
EOSINOPHIL # BLD AUTO: 0.1 K/UL — SIGNIFICANT CHANGE UP (ref 0–0.5)
EOSINOPHIL NFR BLD AUTO: 0.5 % — SIGNIFICANT CHANGE UP (ref 0–6)
GLUCOSE BLDC GLUCOMTR-MCNC: 216 MG/DL — HIGH (ref 70–99)
GLUCOSE BLDC GLUCOMTR-MCNC: 287 MG/DL — HIGH (ref 70–99)
GLUCOSE BLDC GLUCOMTR-MCNC: 291 MG/DL — HIGH (ref 70–99)
GLUCOSE BLDC GLUCOMTR-MCNC: 348 MG/DL — HIGH (ref 70–99)
GLUCOSE SERPL-MCNC: 245 MG/DL — HIGH (ref 70–99)
HCT VFR BLD CALC: 40.4 % — SIGNIFICANT CHANGE UP (ref 34.5–45)
HGB BLD-MCNC: 13 G/DL — SIGNIFICANT CHANGE UP (ref 11.5–15.5)
LYMPHOCYTES # BLD AUTO: 2.5 K/UL — SIGNIFICANT CHANGE UP (ref 1–3.3)
LYMPHOCYTES # BLD AUTO: 21.6 % — SIGNIFICANT CHANGE UP (ref 13–44)
MAGNESIUM SERPL-MCNC: 2.2 MG/DL — SIGNIFICANT CHANGE UP (ref 1.6–2.6)
MCHC RBC-ENTMCNC: 28 PG — SIGNIFICANT CHANGE UP (ref 27–34)
MCHC RBC-ENTMCNC: 32.1 GM/DL — SIGNIFICANT CHANGE UP (ref 32–36)
MCV RBC AUTO: 87.3 FL — SIGNIFICANT CHANGE UP (ref 80–100)
MONOCYTES # BLD AUTO: 1 K/UL — HIGH (ref 0–0.9)
MONOCYTES NFR BLD AUTO: 9 % — SIGNIFICANT CHANGE UP (ref 2–14)
NEUTROPHILS # BLD AUTO: 7.9 K/UL — HIGH (ref 1.8–7.4)
NEUTROPHILS NFR BLD AUTO: 68.4 % — SIGNIFICANT CHANGE UP (ref 43–77)
PHOSPHATE SERPL-MCNC: 2.5 MG/DL — SIGNIFICANT CHANGE UP (ref 2.5–4.5)
PLATELET # BLD AUTO: 290 K/UL — SIGNIFICANT CHANGE UP (ref 150–400)
POTASSIUM SERPL-MCNC: 4.3 MMOL/L — SIGNIFICANT CHANGE UP (ref 3.5–5.3)
POTASSIUM SERPL-SCNC: 4.3 MMOL/L — SIGNIFICANT CHANGE UP (ref 3.5–5.3)
RBC # BLD: 4.63 M/UL — SIGNIFICANT CHANGE UP (ref 3.8–5.2)
RBC # FLD: 13.6 % — SIGNIFICANT CHANGE UP (ref 10.3–14.5)
SODIUM SERPL-SCNC: 137 MMOL/L — SIGNIFICANT CHANGE UP (ref 135–145)
WBC # BLD: 11.5 K/UL — HIGH (ref 3.8–10.5)
WBC # FLD AUTO: 11.5 K/UL — HIGH (ref 3.8–10.5)

## 2018-04-12 PROCEDURE — 99233 SBSQ HOSP IP/OBS HIGH 50: CPT

## 2018-04-12 PROCEDURE — 99222 1ST HOSP IP/OBS MODERATE 55: CPT

## 2018-04-12 RX ORDER — INSULIN LISPRO 100/ML
VIAL (ML) SUBCUTANEOUS AT BEDTIME
Qty: 0 | Refills: 0 | Status: DISCONTINUED | OUTPATIENT
Start: 2018-04-12 | End: 2018-04-13

## 2018-04-12 RX ORDER — INSULIN GLARGINE 100 [IU]/ML
15 INJECTION, SOLUTION SUBCUTANEOUS AT BEDTIME
Qty: 0 | Refills: 0 | Status: DISCONTINUED | OUTPATIENT
Start: 2018-04-12 | End: 2018-04-13

## 2018-04-12 RX ORDER — INSULIN LISPRO 100/ML
2 VIAL (ML) SUBCUTANEOUS
Qty: 0 | Refills: 0 | Status: DISCONTINUED | OUTPATIENT
Start: 2018-04-12 | End: 2018-04-12

## 2018-04-12 RX ORDER — LISINOPRIL 2.5 MG/1
2.5 TABLET ORAL DAILY
Qty: 0 | Refills: 0 | Status: DISCONTINUED | OUTPATIENT
Start: 2018-04-12 | End: 2018-04-13

## 2018-04-12 RX ORDER — INSULIN LISPRO 100/ML
4 VIAL (ML) SUBCUTANEOUS
Qty: 0 | Refills: 0 | Status: DISCONTINUED | OUTPATIENT
Start: 2018-04-12 | End: 2018-04-13

## 2018-04-12 RX ORDER — INSULIN LISPRO 100/ML
VIAL (ML) SUBCUTANEOUS
Qty: 0 | Refills: 0 | Status: DISCONTINUED | OUTPATIENT
Start: 2018-04-12 | End: 2018-04-13

## 2018-04-12 RX ORDER — INSULIN GLARGINE 100 [IU]/ML
10 INJECTION, SOLUTION SUBCUTANEOUS AT BEDTIME
Qty: 0 | Refills: 0 | Status: DISCONTINUED | OUTPATIENT
Start: 2018-04-12 | End: 2018-04-12

## 2018-04-12 RX ADMIN — NYSTATIN CREAM 1 APPLICATION(S): 100000 CREAM TOPICAL at 05:36

## 2018-04-12 RX ADMIN — Medication 12.5 MILLIGRAM(S): at 05:37

## 2018-04-12 RX ADMIN — ATORVASTATIN CALCIUM 80 MILLIGRAM(S): 80 TABLET, FILM COATED ORAL at 21:20

## 2018-04-12 RX ADMIN — Medication 4 UNIT(S): at 18:35

## 2018-04-12 RX ADMIN — Medication 6: at 13:22

## 2018-04-12 RX ADMIN — Medication 81 MILLIGRAM(S): at 09:18

## 2018-04-12 RX ADMIN — HEPARIN SODIUM 5000 UNIT(S): 5000 INJECTION INTRAVENOUS; SUBCUTANEOUS at 13:22

## 2018-04-12 RX ADMIN — Medication 12.5 MILLIGRAM(S): at 18:35

## 2018-04-12 RX ADMIN — INSULIN GLARGINE 15 UNIT(S): 100 INJECTION, SOLUTION SUBCUTANEOUS at 22:23

## 2018-04-12 RX ADMIN — TICAGRELOR 90 MILLIGRAM(S): 90 TABLET ORAL at 05:36

## 2018-04-12 RX ADMIN — Medication 2: at 22:23

## 2018-04-12 RX ADMIN — Medication 3: at 18:34

## 2018-04-12 RX ADMIN — HEPARIN SODIUM 5000 UNIT(S): 5000 INJECTION INTRAVENOUS; SUBCUTANEOUS at 05:37

## 2018-04-12 RX ADMIN — LISINOPRIL 2.5 MILLIGRAM(S): 2.5 TABLET ORAL at 13:21

## 2018-04-12 RX ADMIN — HEPARIN SODIUM 5000 UNIT(S): 5000 INJECTION INTRAVENOUS; SUBCUTANEOUS at 21:20

## 2018-04-12 RX ADMIN — NYSTATIN CREAM 1 APPLICATION(S): 100000 CREAM TOPICAL at 19:12

## 2018-04-12 RX ADMIN — Medication 4: at 09:17

## 2018-04-12 RX ADMIN — TICAGRELOR 90 MILLIGRAM(S): 90 TABLET ORAL at 18:35

## 2018-04-12 NOTE — DISCHARGE NOTE ADULT - CARE PROVIDER_API CALL
Timur Joe), Cardiovascular Disease; Internal Medicine  [address] [place] [address]  Phone: [phone]  Fax: [phone]    Alfredito Marquez  Endocrinology  Phone: (   )    -  Fax: (   )    -

## 2018-04-12 NOTE — DISCHARGE NOTE ADULT - HOSPITAL COURSE
72 female with PMH of HTN, HLD, [id number], presented to OSH with c/o chest pain, found to have STEMI, transferred to CCU at [facility] for LHC.  Patient loaded with ASA/Plavix and started on heparin gtt. Underwent cardiac cath on 4/10 with 99% occlusion to OM1 s/p EDWIGE.  Patient was started on metoprolol, ASA, Brilinta, and atorvastatin.  Patient remained HD stable and was transferred to [facility] on 4/11.  Lisinopril was started on 4/11.  Echocardiogram showed……………………….  Patient seen by Endocrinology for uncontrolled diabetes – A1c 11.1.  It was discovered that she was incorrectly using her Trulicity pen and not receiving any medication.  Patient instructed on how to use pen by the endocrinologist. 72 female with PMH of HTN, HLD, [id number], presented to OSH with c/o chest pain, found to have STEMI, transferred to CCU at [facility] for LHC.  Patient loaded with ASA/Plavix and started on heparin gtt. Underwent cardiac cath on 4/10 with 99% occlusion to OM1 s/p EDWIGE.  Patient was started on metoprolol, ASA, Brilinta, and atorvastatin.  Patient remained HD stable and was transferred to [facility] on 4/11.  Lisinopril was started on 4/11.  Echocardiogram showed……………………….  Patient seen by Endocrinology for uncontrolled diabetes – A1c 11.1.  It was discovered that she was incorrectly using her Trulicity pen and not receiving any medication.  Patient instructed on how to use pen by the endocrinologist. Discharged home with cardiology, Endocrine and PMD follow up. 72 female with PMH of HTN, HLD, [id number], presented to OSH with c/o chest pain, found to have STEMI, transferred to CCU at [facility] for LHC.  Patient loaded with ASA/Plavix and started on heparin gtt. Underwent cardiac cath on 4/10 with 99% occlusion to OM1 s/p EDWIGE.  Patient was started on metoprolol, ASA, Brilinta, and atorvastatin.  Patient remained HD stable and was transferred to [facility] on 4/11.  Lisinopril was started on 4/11.  Echocardiogram showed…EF 50%.  Patient seen by Endocrinology for uncontrolled diabetes – A1c 11.1.  It was discovered that she was using her Trulicity pen and not receiving any medication.  Patient instructed on how to use pen by the endocrinologist. Discharged home with cardiology, Endocrine and PMD follow up.

## 2018-04-12 NOTE — CONSULT NOTE ADULT - ASSESSMENT
71 yo female here s/p STEMI, with endocrine consulted for DM2 after A1c was obtained and found to be 11.1.  She is on metformin and 2 other orals she thinks (although she can not tell me names) but additionally just started trulicity 1 month ago (although later found to be injecting incorrectly).  I took a trulicity demo pen and instructed her how to use; discovered that she was pressing the green button onto her skin and discharging the medication into the air (not getting any of it).

## 2018-04-12 NOTE — DISCHARGE NOTE ADULT - OTHER SIGNIFICANT FINDINGS
< from: Cardiac Cath Lab - Adult (04.10.18 @ 18:43) >  VENTRICLES: Analysis of regional contractile function demonstrated severe  apical hypokinesis and severe diaphragmatic hypokinesis. EF estimated was  40 %.  CORONARY VESSELS: The coronary circulation is right dominant.  LM:   --  LM: Angiography showed minor luminal irregularities with no flow  limiting lesions.  LAD:   --  LAD: Angiography showed mild atherosclerosis with no flow  limiting lesions.  CX:   --  Circumflex: Angiography showed mild atherosclerosis with no flow  limiting lesions.  --  OM1: There was a 99 % stenosis.  RCA:   --  RCA: Angiography showed mild atherosclerosis with no flow  limiting lesions.  COMPLICATIONS: There were no complications.  SUMMARY:  CORONARY VESSELS: LM: Angiography showed minor luminal irregularities with  no flow limiting lesions. LAD: Angiography showed mild atherosclerosis  with no flow limiting lesions. OM1: Therewas a 99 % stenosis. RCA:  Angiography showed mild atherosclerosis with no flow limiting lesions.  CARDIAC STRUCTURES: Analysis of regional contractile function demonstrated  severe apical hypokinesis and severe diaphragmatic hypokinesis. EF  estimated was 40 %.  1ST LESION INTERVENTIONS: A percutaneous intervention was performed on the  99 % lesion in the 1st obtuse marginal. Following intervention there was  an excellent angiographic appearance with a 1 % residual stenosis.  DIAGNOSTIC RECOMMENDATIONS: PCI OM for NSTEMI.  INTERVENTIONAL RECOMMENDATIONS: Load brilinta, aspirin, statin, beta  blocker and ACEI as BP allows.    < end of copied text >

## 2018-04-12 NOTE — DISCHARGE NOTE ADULT - SECONDARY DIAGNOSIS.
Uncontrolled type 2 diabetes mellitus with hyperglycemia, without long-term current use of insulin Essential hypertension

## 2018-04-12 NOTE — DISCHARGE NOTE ADULT - ADDITIONAL INSTRUCTIONS
Follow up with cardiologist in 1 week  Follow up with Endocrinologist in 1 week Follow up with cardiologist in 1 week ( call  ) to make an appointment  Follow up with Endocrinologist in 1 week  Follow up with PMD Follow up with cardiologist in 1 week ( call  ) to make an appointment  Follow up with Endocrinologist in 1 week  Follow up with PMD.

## 2018-04-12 NOTE — PROGRESS NOTE ADULT - SUBJECTIVE AND OBJECTIVE BOX
Patient is a 72y old  Female who presents with a chief complaint of Chest pain (10 Apr 2018 21:48)        SUBJECTIVE / OVERNIGHT EVENTS:       MEDICATIONS  (STANDING):  aspirin enteric coated 81 milliGRAM(s) Oral daily  atorvastatin 80 milliGRAM(s) Oral at bedtime  dextrose 5%. 1000 milliLiter(s) (50 mL/Hr) IV Continuous <Continuous>  dextrose 50% Injectable 12.5 Gram(s) IV Push once  dextrose 50% Injectable 25 Gram(s) IV Push once  dextrose 50% Injectable 25 Gram(s) IV Push once  heparin  Injectable 5000 Unit(s) SubCutaneous every 8 hours  insulin lispro (HumaLOG) corrective regimen sliding scale   SubCutaneous three times a day before meals  insulin lispro (HumaLOG) corrective regimen sliding scale   SubCutaneous at bedtime  metoprolol tartrate 12.5 milliGRAM(s) Oral two times a day  nystatin Powder 1 Application(s) Topical two times a day  ticagrelor 90 milliGRAM(s) Oral two times a day    MEDICATIONS  (PRN):  dextrose Gel 1 Dose(s) Oral once PRN Blood Glucose LESS THAN 70 milliGRAM(s)/deciliter  glucagon  Injectable 1 milliGRAM(s) IntraMuscular once PRN Glucose LESS THAN 70 milligrams/deciliter      Vital Signs Last 24 Hrs  T(C): 37.4 (12 Apr 2018 12:16), Max: 37.4 (11 Apr 2018 19:00)  T(F): 99.4 (12 Apr 2018 12:16), Max: 99.4 (12 Apr 2018 12:16)  HR: 73 (12 Apr 2018 12:16) (64 - 83)  BP: 106/63 (12 Apr 2018 12:16) (93/60 - 191/55)  BP(mean): 68 (11 Apr 2018 23:00) (65 - 90)  RR: 17 (12 Apr 2018 12:16) (17 - 25)  SpO2: 96% (12 Apr 2018 12:16) (96% - 99%)  CAPILLARY BLOOD GLUCOSE      POCT Blood Glucose.: 216 mg/dL (12 Apr 2018 08:36)  POCT Blood Glucose.: 297 mg/dL (11 Apr 2018 21:41)  POCT Blood Glucose.: 243 mg/dL (11 Apr 2018 17:23)    I&O's Summary    11 Apr 2018 07:01  -  12 Apr 2018 07:00  --------------------------------------------------------  IN: 240 mL / OUT: 500 mL / NET: -260 mL    12 Apr 2018 07:01  -  12 Apr 2018 12:20  --------------------------------------------------------  IN: 360 mL / OUT: 0 mL / NET: 360 mL        PHYSICAL EXAM:  GENERAL: NAD  HEAD:  Atraumatic, Normocephalic  EYES: conjunctiva and sclera clear  NECK: No JVD  CHEST/LUNG: CTA b/l  HEART: S1 S2 RRR  ABDOMEN: +BS Soft, NT/ND  EXTREMITIES:  2+ DP Pulses, No c/c/e  NEUROLOGY: AAOx3, no focal deficits   SKIN: No rashes or lesions    LABS:                        13.0   11.5  )-----------( 290      ( 12 Apr 2018 06:47 )             40.4     04-12    137  |  101  |  16  ----------------------------<  245<H>  4.3   |  22  |  1.07    Ca    10.4      12 Apr 2018 06:47  Phos  2.5     04-12  Mg     2.2     04-12    TPro  6.9  /  Alb  3.7  /  TBili  0.6  /  DBili  x   /  AST  145<H>  /  ALT  28  /  AlkPhos  106  04-11    PT/INR - ( 10 Apr 2018 17:11 )   PT: 10.1 sec;   INR: 0.93 ratio         PTT - ( 10 Apr 2018 17:11 )  PTT:27.8 sec  CARDIAC MARKERS ( 11 Apr 2018 11:19 )  x     / 3.65 ng/mL / 1331 U/L / x     / 82.9 ng/mL  CARDIAC MARKERS ( 11 Apr 2018 02:52 )  x     / 5.04 ng/mL / 1677 U/L / x     / 112.8 ng/mL  CARDIAC MARKERS ( 10 Apr 2018 17:11 )  .723 ng/mL / x     / 361 U/L / x     / 9.1 ng/mL          RADIOLOGY & ADDITIONAL TESTS:    Imaging Personally Reviewed:  Consultant(s) Notes Reviewed:    Care Discussed with Consultants/Other Providers: Patient is a 72y old  Female who presents with a chief complaint of Chest pain (10 Apr 2018 21:48)        SUBJECTIVE / OVERNIGHT EVENTS: no acute complaints - "feels good"      MEDICATIONS  (STANDING):  aspirin enteric coated 81 milliGRAM(s) Oral daily  atorvastatin 80 milliGRAM(s) Oral at bedtime  dextrose 5%. 1000 milliLiter(s) (50 mL/Hr) IV Continuous <Continuous>  dextrose 50% Injectable 12.5 Gram(s) IV Push once  dextrose 50% Injectable 25 Gram(s) IV Push once  dextrose 50% Injectable 25 Gram(s) IV Push once  heparin  Injectable 5000 Unit(s) SubCutaneous every 8 hours  insulin lispro (HumaLOG) corrective regimen sliding scale   SubCutaneous three times a day before meals  insulin lispro (HumaLOG) corrective regimen sliding scale   SubCutaneous at bedtime  metoprolol tartrate 12.5 milliGRAM(s) Oral two times a day  nystatin Powder 1 Application(s) Topical two times a day  ticagrelor 90 milliGRAM(s) Oral two times a day    MEDICATIONS  (PRN):  dextrose Gel 1 Dose(s) Oral once PRN Blood Glucose LESS THAN 70 milliGRAM(s)/deciliter  glucagon  Injectable 1 milliGRAM(s) IntraMuscular once PRN Glucose LESS THAN 70 milligrams/deciliter      Vital Signs Last 24 Hrs  T(C): 37.4 (12 Apr 2018 12:16), Max: 37.4 (11 Apr 2018 19:00)  T(F): 99.4 (12 Apr 2018 12:16), Max: 99.4 (12 Apr 2018 12:16)  HR: 73 (12 Apr 2018 12:16) (64 - 83)  BP: 106/63 (12 Apr 2018 12:16) (93/60 - 191/55)  BP(mean): 68 (11 Apr 2018 23:00) (65 - 90)  RR: 17 (12 Apr 2018 12:16) (17 - 25)  SpO2: 96% (12 Apr 2018 12:16) (96% - 99%)  CAPILLARY BLOOD GLUCOSE      POCT Blood Glucose.: 216 mg/dL (12 Apr 2018 08:36)  POCT Blood Glucose.: 297 mg/dL (11 Apr 2018 21:41)  POCT Blood Glucose.: 243 mg/dL (11 Apr 2018 17:23)    I&O's Summary    11 Apr 2018 07:01  -  12 Apr 2018 07:00  --------------------------------------------------------  IN: 240 mL / OUT: 500 mL / NET: -260 mL    12 Apr 2018 07:01  -  12 Apr 2018 12:20  --------------------------------------------------------  IN: 360 mL / OUT: 0 mL / NET: 360 mL        PHYSICAL EXAM:  GENERAL: NAD  HEAD:  Atraumatic, Normocephalic  EYES: conjunctiva and sclera clear  NECK: No JVD  CHEST/LUNG: CTA b/l  HEART: S1 S2 RRR  ABDOMEN: +BS Soft, NT/ND  EXTREMITIES:  2+ DP Pulses, No c/c/e  NEUROLOGY: AAOx3, no focal deficits   SKIN: No rashes or lesions    LABS:                        13.0   11.5  )-----------( 290      ( 12 Apr 2018 06:47 )             40.4     04-12    137  |  101  |  16  ----------------------------<  245<H>  4.3   |  22  |  1.07    Ca    10.4      12 Apr 2018 06:47  Phos  2.5     04-12  Mg     2.2     04-12    TPro  6.9  /  Alb  3.7  /  TBili  0.6  /  DBili  x   /  AST  145<H>  /  ALT  28  /  AlkPhos  106  04-11    PT/INR - ( 10 Apr 2018 17:11 )   PT: 10.1 sec;   INR: 0.93 ratio         PTT - ( 10 Apr 2018 17:11 )  PTT:27.8 sec  CARDIAC MARKERS ( 11 Apr 2018 11:19 )  x     / 3.65 ng/mL / 1331 U/L / x     / 82.9 ng/mL  CARDIAC MARKERS ( 11 Apr 2018 02:52 )  x     / 5.04 ng/mL / 1677 U/L / x     / 112.8 ng/mL  CARDIAC MARKERS ( 10 Apr 2018 17:11 )  .723 ng/mL / x     / 361 U/L / x     / 9.1 ng/mL          RADIOLOGY & ADDITIONAL TESTS:    Imaging Personally Reviewed:   Consultant(s) Notes Reviewed: Cards    Care Discussed with Consultants/Other Providers: d/w Endocrine

## 2018-04-12 NOTE — CONSULT NOTE ADULT - SUBJECTIVE AND OBJECTIVE BOX
HPI:  72F PMH HTN, HLD, DMII presented with chest pain to [facility] and was transferred the [facility] for cath.      Pt notes that she has had DM2 that persisted after she initially had GDM with a pregnancy in .  She went back to see MD about 1 month post partum and was diagnosed with DM2 at that time. She currently follows with her PCP for mgmt of this and has no FH of DM in her immediate family.  She currently is on metformin 1000 mg bid and tolerates this well and thinks that she may also be on 2 additional oral meds but can not tell me the names of these.  Further, she takes trulicity which she just started 1 month ago. She isn't sure if she's on the 0.75 or the 1.5 mg weekly dose.  She also is candid about the fact that she didn't get formal pen teaching and isn't sure if she's injecting correctly.  (see below, later determined she is not injecting correctly).  A1c is 11.1.    Here, has good appetite but still hasn't received lunch tray around 1:30 and she isn't sure why.  sugars mid to hgih 200s on SSI only.  She denies complications from her DM but is again admitted for chest pain (and found to have STEMI here so does have CAD).  She also denies h/o low BG <70 and denies h/o DKA.      No personal h/o pancreatitis, and no FH of MEN2, MTC, or panc cancer.       PAST MEDICAL & SURGICAL HISTORY:  Hyperlipidemia associated with type 2 diabetes mellitus  Controlled type 2 diabetes mellitus without complication, without long-term current use of insulin  Essential hypertension  HLD (hyperlipidemia)  DM (diabetes mellitus)  HTN (hypertension)  No significant past surgical history  H/O:       FAMILY HISTORY:  No pertinent family history in first degree relatives      Social History: no ilicit drug use     Outpatient Medications:    MEDICATIONS  (STANDING):  aspirin enteric coated 81 milliGRAM(s) Oral daily  atorvastatin 80 milliGRAM(s) Oral at bedtime  dextrose 5%. 1000 milliLiter(s) (50 mL/Hr) IV Continuous <Continuous>  dextrose 50% Injectable 12.5 Gram(s) IV Push once  dextrose 50% Injectable 25 Gram(s) IV Push once  dextrose 50% Injectable 25 Gram(s) IV Push once  heparin  Injectable 5000 Unit(s) SubCutaneous every 8 hours  insulin glargine Injectable (LANTUS) 10 Unit(s) SubCutaneous at bedtime  insulin lispro (HumaLOG) corrective regimen sliding scale   SubCutaneous three times a day before meals  insulin lispro (HumaLOG) corrective regimen sliding scale   SubCutaneous at bedtime  insulin lispro Injectable (HumaLOG) 2 Unit(s) SubCutaneous three times a day before meals  lisinopril 2.5 milliGRAM(s) Oral daily  metoprolol tartrate 12.5 milliGRAM(s) Oral two times a day  nystatin Powder 1 Application(s) Topical two times a day  ticagrelor 90 milliGRAM(s) Oral two times a day    MEDICATIONS  (PRN):  dextrose Gel 1 Dose(s) Oral once PRN Blood Glucose LESS THAN 70 milliGRAM(s)/deciliter  glucagon  Injectable 1 milliGRAM(s) IntraMuscular once PRN Glucose LESS THAN 70 milligrams/deciliter      Allergies    No Known Allergies    Intolerances      Review of Systems:  Constitutional: No fever  Neuro: No tremors  HEENT: No pain  Cardiovascular: No chest pain, palpitations  Respiratory: No SOB, no cough  GI: No nausea, vomiting, abdominal pain  : No dysuria  Skin: no rash  Psych: no depression  Endocrine: no polyuria, polydipsia  Hem/lymph: no swelling    ALL OTHER SYSTEMS REVIEWED AND NEGATIVE    PHYSICAL EXAM:  VITALS: T(C): 37.4 (18 @ 12:16)  T(F): 99.4 (18 @ 12:16), Max: 99.4 (18 @ 12:16)  HR: 73 (18 @ 12:16) (70 - 83)  BP: 106/63 (18 @ 12:16) (101/58 - 191/55)  RR:  (17 - 23)  SpO2:  (96% - 99%)  Wt(kg): --  GENERAL: NAD, well-developed  EYES: No proptosis, no lid lag, anicteric  HEENT:  Atraumatic, Normocephalic, moist mucous membranes  RESPIRATORY: breathing comfortably on room air, no accessory muscle use or costal retractions  CARDIOVASCULAR: warm and well perfused, no peripheral edema  GI: non distended, normal bowel sounds  SKIN: Dry, intact, No rashes   MUSCULOSKELETAL: Full range of motion, no lordosis   NEURO: speech fluent, face symmetric   PSYCH: Alert and oriented x 3, normal affect, normal mood    POCT Blood Glucose.: 291 mg/dL (18 @ 12:47)  POCT Blood Glucose.: 216 mg/dL (18 @ 08:36)  POCT Blood Glucose.: 297 mg/dL (18 @ 21:41)  POCT Blood Glucose.: 243 mg/dL (18 @ 17:23)  POCT Blood Glucose.: 247 mg/dL (18 @ 12:01)  POCT Blood Glucose.: 155 mg/dL (18 @ 08:11)  POCT Blood Glucose.: 238 mg/dL (04-10-18 @ 23:04)  POCT Blood Glucose.: 231 mg/dL (04-10-18 @ 19:51)                            13.0   11.5  )-----------( 290      ( 2018 06:47 )             40.4           137  |  101  |  16  ----------------------------<  245<H>  4.3   |  22  |  1.07    EGFR if : 61  EGFR if non : 53<L>    Ca    10.4        Mg     2.2       Phos  2.5         TPro  6.9  /  Alb  3.7  /  TBili  0.6  /  DBili  x   /  AST  145<H>  /  ALT  28  /  AlkPhos  106        Thyroid Function Tests:   @ 05:29 TSH 2.43 FreeT4 -- T3 -- Anti TPO -- Anti Thyroglobulin Ab -- TSI --  04-10 @ 22:55 TSH 1.78 FreeT4 -- T3 -- Anti TPO -- Anti Thyroglobulin Ab -- TSI --      Hemoglobin A1C, Whole Blood: 11.1 % <H> [4.0 - 5.6] (18 @ 05:29)       Chol 161 LDL 88 HDL 44 Trig 144, 04-10 Chol 138 LDL 83 HDL 46 Trig 47    Radiology:

## 2018-04-12 NOTE — DISCHARGE NOTE ADULT - PATIENT PORTAL LINK FT
You can access the [facility] Patient Portal, offered by [facility], by registering with the following website: [email or web address]

## 2018-04-12 NOTE — PROGRESS NOTE ADULT - SUBJECTIVE AND OBJECTIVE BOX
Cardiology Attending Progress Note    CHIEF COMPLAINT/REASON FOR CONSULT: s/p AWSTEMI    HISTORY OF PRESENT ILLNESS:    72y.o. Female with DM2, HTN, presents after transfer from [facility] on 04/10/2010 with chest pain, found to have STEMI and tx for LHC at [facility], subsequently underwent LHC/PCI with EDWIGE to OM1.    INTERVAL EVENTS:   Patient seen and examined. Overall she states that she feels well. No Chest Pain. No SOB. No HA/Dizziness. No Palpitations. No N/V/D/F/C.    Allergies    No Known Allergies    Intolerances    	    MEDICATIONS:  aspirin enteric coated 81 milliGRAM(s) Oral daily  heparin  Injectable 5000 Unit(s) SubCutaneous every 8 hours  metoprolol tartrate 12.5 milliGRAM(s) Oral two times a day  ticagrelor 90 milliGRAM(s) Oral two times a day  atorvastatin 80 milliGRAM(s) Oral at bedtime  dextrose 50% Injectable 12.5 Gram(s) IV Push once  dextrose 50% Injectable 25 Gram(s) IV Push once  dextrose 50% Injectable 25 Gram(s) IV Push once  dextrose Gel 1 Dose(s) Oral once PRN  glucagon  Injectable 1 milliGRAM(s) IntraMuscular once PRN  insulin lispro (HumaLOG) corrective regimen sliding scale   SubCutaneous three times a day before meals  insulin lispro (HumaLOG) corrective regimen sliding scale   SubCutaneous at bedtime    dextrose 5%. 1000 milliLiter(s) IV Continuous <Continuous>  nystatin Powder 1 Application(s) Topical two times a day      PAST MEDICAL & SURGICAL HISTORY:  Hyperlipidemia associated with type 2 diabetes mellitus  Controlled type 2 diabetes mellitus without complication, without long-term current use of insulin  Essential hypertension  HLD (hyperlipidemia)  DM (diabetes mellitus)  HTN (hypertension)  No significant past surgical history  H/O:       FAMILY HISTORY:  No pertinent family history in first degree relatives      SOCIAL HISTORY:    Never smoked, no ETOH, no IVDU    REVIEW OF SYSTEMS:    CONSTITUTIONAL: No weakness, fevers or chills  EYES/ENT: No visual changes;  No vertigo or throat pain   NECK: No pain or stiffness  RESPIRATORY: No cough, wheezing, hemoptysis; No shortness of breath  CARDIOVASCULAR: No chest pain or palpitations  GASTROINTESTINAL: No abdominal or epigastric pain. No nausea, vomiting, or hematemesis; No diarrhea or constipation. No melena or hematochezia.  GENITOURINARY: No dysuria, frequency or hematuria  NEUROLOGICAL: No numbness or weakness  SKIN: No itching, burning, rashes, or lesions   All other review of systems is negative unless indicated above.    PHYSICAL EXAM:  T(C): 37.1 (18 @ 04:13), Max: 37.4 (18 @ 19:00)  HR: 83 (18 @ 05:39) (64 - 83)  BP: 127/69 (18 @ 05:39) (93/60 - 191/55)  RR: 18 (18 @ 04:13) (18 - 31)  SpO2: 97% (18 @ 04:13) (97% - 99%)  Wt(kg): --  I&O's Summary    2018 07:  -  2018 07:00  --------------------------------------------------------  IN: 240 mL / OUT: 500 mL / NET: -260 mL    2018 07:  -  2018 11:24  --------------------------------------------------------  IN: 360 mL / OUT: 0 mL / NET: 360 mL        Appearance: Normal	  HEENT:   Normal oral mucosa, PERRL, EOMI	  Lymphatic: No lymphadenopathy  Cardiovascular: Normal S1 S2, No JVD, No murmurs, No edema  Respiratory: Lungs clear to auscultation	  Psychiatry: A & O x 3, Mood & affect appropriate  Gastrointestinal:  Soft, Non-tender, + BS	  Skin: No rashes, No ecchymoses, No cyanosis	  Neurologic: Non-focal  Extremities: Normal range of motion, No clubbing, cyanosis or edema  Vascular: Peripheral pulses palpable 2+ bilaterally    LABS:	 	    CBC Full  -  ( 2018 06:47 )  WBC Count : 11.5 K/uL  Hemoglobin : 13.0 g/dL  Hematocrit : 40.4 %  Platelet Count - Automated : 290 K/uL  Mean Cell Volume : 87.3 fl  Mean Cell Hemoglobin : 28.0 pg  Mean Cell Hemoglobin Concentration : 32.1 gm/dL  Auto Neutrophil # : 7.9 K/uL  Auto Lymphocyte # : 2.5 K/uL  Auto Monocyte # : 1.0 K/uL  Auto Eosinophil # : 0.1 K/uL  Auto Basophil # : 0.1 K/uL  Auto Neutrophil % : 68.4 %  Auto Lymphocyte % : 21.6 %  Auto Monocyte % : 9.0 %  Auto Eosinophil % : 0.5 %  Auto Basophil % : 0.5 %        137  |  101  |  16  ----------------------------<  245<H>  4.3   |  22  |  1.07  -11    139  |  104  |  15  ----------------------------<  295<H>  4.1   |  22  |  0.78    Ca    10.4      2018 06:47  Ca    10.3      2018 11:19  Phos  2.5     04-12  Phos  2.9     -11  Mg     2.2     04-12  Mg     1.9     -11    TPro  6.9  /  Alb  3.7  /  TBili  0.6  /  DBili  x   /  AST  145<H>  /  ALT  28  /  AlkPhos  106  04-11  TPro  7.1  /  Alb  3.7  /  TBili  0.5  /  DBili  x   /  AST  156<H>  /  ALT  28  /  AlkPhos  108  04-11    CARDIAC MARKERS:  Troponin I, Serum: .723 ng/mL (04-10 @ 17:11)    EC2018 NSR@64, +Qwaves Inferior + V5-V6, TWI V5-V6    TELEMETRY: NSR, no events  	    	  CXR: Clear lungs BL    TTE: Pending TTE    LHC/PCI 04/10/2018:  CONTRAST GIVEN: Omnipaque 56 ml. Omnipaque 56 ml.  MEDICATIONS GIVEN: Fentanyl, 25 mcg, IV. Midazolam, 1 mg, IV. Heparin, 8000  units, IV. Ticagrelor, 180 mg, PO.  VENTRICLES: Analysis of regional contractile function demonstrated severe  apical hypokinesis and severe diaphragmatic hypokinesis. EF estimated was  40 %.  CORONARY VESSELS: The coronary circulation is right dominant.  LM:   --  LM: Angiography showed minor luminal irregularities with no flow  limiting lesions.  LAD:   --  LAD: Angiography showed mild atherosclerosis with no flow  limiting lesions.  CX:   --  Circumflex: Angiography showed mild atherosclerosis with no flow  limiting lesions.  --  OM1: There was a 99 % stenosis.  RCA:   --  RCA: Angiography showed mild atherosclerosis with no flow  limiting lesions.  COMPLICATIONS: There were no complications.  SUMMARY:  CORONARY VESSELS: LM: Angiography showed minor luminal irregularities with  no flow limiting lesions. LAD: Angiography showed mild atherosclerosis  with no flow limiting lesions. OM1: There was a 99 % stenosis. RCA:  Angiography showed mild atherosclerosis with no flow limiting lesions.  CARDIAC STRUCTURES: Analysis of regional contractile function demonstrated  severe apical hypokinesis and severe diaphragmatic hypokinesis. EF  estimated was 40 %.  1ST LESION INTERVENTIONS: A percutaneous intervention was performed on the  99 % lesion in the 1st obtuse marginal. Following intervention there was  an excellent angiographic appearance with a 1 % residual stenosis.  DIAGNOSTIC RECOMMENDATIONS: PCI OM for NSTEMI.  INTERVENTIONAL RECOMMENDATIONS: Load brilinta, aspirin, statin, beta  blocker and ACEI as BP allows.      A/P: 72y.o. Female with DM2, HTN, presents after transfer from [facility] on 04/10/2018 with chest pain, found to have STEMI and tx for LHC at [facility], subsequently underwent LHC/PCI with EDWIGE to [facility] on 04/10/2018.    1. CAD s/p STEMI s/p PCI - s/p recent STEMI now s/p EDWIGE to 1 on 04/10/2018, continues to do well post-op  -No recent chest pain/SOB. No ectopy on Telemetry. Cardiac enzymes downtrending.   -Cont ASA 81 mg po QD lifelong  -Cont Ticagrelor 90 mg po BID  -Cont Atorvastatin 80 mg po QHS  -Cont Metoprolol 12.5 mg po BID   -Please obtain TTE prior to discharge (Ventriculogram with LVEF 40% on [facility]). Appears euvolemic at this time.  -Discussed with patient and she pans to follow up with her cardiologist Dr. Thomas at [facility] within 2 weeks of discharge. I also offered to see her here in our clinic.    2. DM2 - Cont CHALO    3. HTN - BP controlled  -Start low dose ACE (Lisinopril 10 mg po QD) today given recent STEMI.    Thank you for this interesting consult. My team will continue to follow along with you.    Timur Joe MD  Cardiology Attending  [facility]   Cell: [phone]  (Cardiology Nocturnist cell number available 7 pm - 7 am every night; available daytime week days for follow-up only; daytime weekends covered by general cardiology consult service)  8

## 2018-04-12 NOTE — DISCHARGE NOTE ADULT - CARE PLAN
Principal Discharge DX:	ST elevation myocardial infarction (STEMI) involving other coronary artery  Assessment and plan of treatment:	Coronary artery disease is a condition where the arteries the supply the heart muscle get clogged with fatty deposits & puts you at risk for a heart attack.  Call your doctor if you have any new pain, pressure, or discomfort in the center of your chest, pain, tingling or discomfort in arms, back, neck, jaw, or stomach, shortness of breath, nausea, vomiting, burping or heartburn, sweating, cold and clammy skin, racing or abnormal heartbeat for more than 10 minutes or if they keep coming & going.  Call 911 and do not try to get to hospital by car.  You can help yourself with lifestyle changes (quitting smoking if you smoke), eat lots of fruits & vegetables & low fat dairy products, not a lot of meat & fatty foods, walk or some form of physical activity most days of the week, lose weight if you are overweight.  Take your cardiac medication as prescribed to lower cholesterol, to lower blood pressure, and control your blood sugar.  Secondary Diagnosis:	Uncontrolled type 2 diabetes mellitus with hyperglycemia, without long-term current use of insulin  Assessment and plan of treatment:	Make sure you get your HgA1c checked every three months.  If you take oral diabetes medications, check your blood glucose at least two times a day.  If you take short-acting insulin, check your blood glucose before meals and at bedtime.  It's important not to skip any meals.  Keep a log of your blood glucose results and always take it with you to your doctor appointments.  Keep a list of your current medications including over the counter medications and bring this medication list with you to all your doctor appointments.  If you have not seen your ophthalmologist this year, call for appointment.  Check your feet daily for redness, sores, or openings.  Do not self treat.  If there is no improvement in two days, call your primary care physician for an appointment.    HgA1c this admission was 11.1.  Appointment scheduled with  _ on _________.  Secondary Diagnosis:	Essential hypertension  Assessment and plan of treatment:	Continue to follow a low salt/sodium diet.  Perform physical activities as tolerated in consultation with your Primary Care Provider and physical therapist.  Take all medications as prescribed.  Follow up with your medical doctor for routine blood pressure monitoring at your next visit.  Notify your doctor if you have any of the following symptoms:  Dizziness, lightheadedness, blurry vision, headache, chest pain, or shortness of breath. Principal Discharge DX:	ST elevation myocardial infarction (STEMI) involving other coronary artery  Goal:	no chest pain  Assessment and plan of treatment:	Coronary artery disease is a condition where the arteries the supply the heart muscle get clogged with fatty deposits & puts you at risk for a heart attack.  Call your doctor if you have any new pain, pressure, or discomfort in the center of your chest, pain, tingling or discomfort in arms, back, neck, jaw, or stomach, shortness of breath, nausea, vomiting, burping or heartburn, sweating, cold and clammy skin, racing or abnormal heartbeat for more than 10 minutes or if they keep coming & going.  Call 911 and do not try to get to hospital by car.  You can help yourself with lifestyle changes (quitting smoking if you smoke), eat lots of fruits & vegetables & low fat dairy products, not a lot of meat & fatty foods, walk or some form of physical activity most days of the week, lose weight if you are overweight.  Take your cardiac medication as prescribed to lower cholesterol, to lower blood pressure, and control your blood sugar.  Secondary Diagnosis:	Uncontrolled type 2 diabetes mellitus with hyperglycemia, without long-term current use of insulin  Assessment and plan of treatment:	Make sure you get your HgA1c checked every three months.  If you take oral diabetes medications, check your blood glucose at least two times a day.  If you take short-acting insulin, check your blood glucose before meals and at bedtime.  It's important not to skip any meals.  Keep a log of your blood glucose results and always take it with you to your doctor appointments.  Keep a list of your current medications including over the counter medications and bring this medication list with you to all your doctor appointments.  If you have not seen your ophthalmologist this year, call for appointment.  Check your feet daily for redness, sores, or openings.  Do not self treat.  If there is no improvement in two days, call your primary care physician for an appointment.    HgA1c this admission was 11.1.  Appointment scheduled with  _ on _________.  Secondary Diagnosis:	Essential hypertension  Assessment and plan of treatment:	Continue to follow a low salt/sodium diet.  Perform physical activities as tolerated in consultation with your Primary Care Provider and physical therapist.  Take all medications as prescribed.  Follow up with your medical doctor for routine blood pressure monitoring at your next visit.  Notify your doctor if you have any of the following symptoms:  Dizziness, lightheadedness, blurry vision, headache, chest pain, or shortness of breath.

## 2018-04-12 NOTE — DISCHARGE NOTE ADULT - MEDICATION SUMMARY - MEDICATIONS TO TAKE
I will START or STAY ON the medications listed below when I get home from the hospital:    aspirin 81 mg oral delayed release tablet  -- 1 tab(s) by mouth once a day  -- Indication: For CAD    lisinopril 2.5 mg oral tablet  -- 2 tab(s) by mouth once a day   -- Indication: For Chf    Prandin 2 mg oral tablet  -- 1 tab(s) by mouth 3 times a day (before meals)  -- Indication: For Diabetes    Trulicity Pen 0.75 mg/0.5 mL subcutaneous solution  -- 0.75 milligram(s) subcutaneous every 7 days  -- Indication: For Diabetes    metFORMIN 1000 mg oral tablet  -- 1 tab(s) by mouth 2 times a day  -- Indication: For Diabetes    atorvastatin 80 mg oral tablet  -- 1 tab(s) by mouth once a day (at bedtime)  -- Indication: For High cholesterol    clopidogrel 75 mg oral tablet  -- 1 tab(s) by mouth once a day   -- Do not take aspirin or aspirin containing products without knowledge and consent of your physician.    -- Indication: For STent    metoprolol succinate 25 mg oral tablet, extended release  -- 1 tab(s) by mouth once a day   -- It is very important that you take or use this exactly as directed.  Do not skip doses or discontinue unless directed by your doctor.  May cause drowsiness.  Alcohol may intensify this effect.  Use care when operating dangerous machinery.  Some non-prescription drugs may aggravate your condition.  Read all labels carefully.  If a warning appears, check with your doctor before taking.  Swallow whole.  Do not crush.  Take with food or milk.  This drug may impair the ability to drive or operate machinery.  Use care until you become familiar with its effects.    -- Indication: For Heart disease    nystatin 100,000 units/g topical powder  -- 1 application on skin 2 times a day  -- Indication: For fungal rash I will START or STAY ON the medications listed below when I get home from the hospital:    aspirin 81 mg oral delayed release tablet  -- 1 tab(s) by mouth once a day  -- Indication: For Heart disease    lisinopril 2.5 mg oral tablet  -- 2 tab(s) by mouth once a day   -- Indication: For High BP    Prandin 2 mg oral tablet  -- 1 tab(s) by mouth 3 times a day (before meals)  -- Indication: For diabetes    Trulicity Pen 0.75 mg/0.5 mL subcutaneous solution  -- 0.75 milligram(s) subcutaneous every 7 days  -- Indication: For diabetes    metFORMIN 1000 mg oral tablet  -- 1 tab(s) by mouth 2 times a day  -- Indication: For diabetes    atorvastatin 80 mg oral tablet  -- 1 tab(s) by mouth once a day (at bedtime)  -- Indication: For High cholesterol    clopidogrel 75 mg oral tablet  -- 1 tab(s) by mouth once a day   -- Do not take aspirin or aspirin containing products without knowledge and consent of your physician.    -- Indication: For Heart disease    metoprolol succinate 25 mg oral tablet, extended release  -- 1 tab(s) by mouth once a day   -- It is very important that you take or use this exactly as directed.  Do not skip doses or discontinue unless directed by your doctor.  May cause drowsiness.  Alcohol may intensify this effect.  Use care when operating dangerous machinery.  Some non-prescription drugs may aggravate your condition.  Read all labels carefully.  If a warning appears, check with your doctor before taking.  Swallow whole.  Do not crush.  Take with food or milk.  This drug may impair the ability to drive or operate machinery.  Use care until you become familiar with its effects.    -- Indication: For Heart disease    nystatin 100,000 units/g topical powder  -- 1 application on skin 2 times a day  -- Indication: For fungal skin rash

## 2018-04-12 NOTE — DISCHARGE NOTE ADULT - PROVIDER TOKENS
TOKEN:'[id number]',FREE:[LAST:[Jimmy],FIRST:[Glaser],PHONE:[(   )    -],FAX:[(   )    -],ADDRESS:[Endocrinology]]

## 2018-04-12 NOTE — DISCHARGE NOTE ADULT - PLAN OF CARE
Coronary artery disease is a condition where the arteries the supply the heart muscle get clogged with fatty deposits & puts you at risk for a heart attack.  Call your doctor if you have any new pain, pressure, or discomfort in the center of your chest, pain, tingling or discomfort in arms, back, neck, jaw, or stomach, shortness of breath, nausea, vomiting, burping or heartburn, sweating, cold and clammy skin, racing or abnormal heartbeat for more than 10 minutes or if they keep coming & going.  Call 911 and do not try to get to hospital by car.  You can help yourself with lifestyle changes (quitting smoking if you smoke), eat lots of fruits & vegetables & low fat dairy products, not a lot of meat & fatty foods, walk or some form of physical activity most days of the week, lose weight if you are overweight.  Take your cardiac medication as prescribed to lower cholesterol, to lower blood pressure, and control your blood sugar. Make sure you get your HgA1c checked every three months.  If you take oral diabetes medications, check your blood glucose at least two times a day.  If you take short-acting insulin, check your blood glucose before meals and at bedtime.  It's important not to skip any meals.  Keep a log of your blood glucose results and always take it with you to your doctor appointments.  Keep a list of your current medications including over the counter medications and bring this medication list with you to all your doctor appointments.  If you have not seen your ophthalmologist this year, call for appointment.  Check your feet daily for redness, sores, or openings.  Do not self treat.  If there is no improvement in two days, call your primary care physician for an appointment.    HgA1c this admission was 11.1.  Appointment scheduled with  _ on _________. Continue to follow a low salt/sodium diet.  Perform physical activities as tolerated in consultation with your Primary Care Provider and physical therapist.  Take all medications as prescribed.  Follow up with your medical doctor for routine blood pressure monitoring at your next visit.  Notify your doctor if you have any of the following symptoms:  Dizziness, lightheadedness, blurry vision, headache, chest pain, or shortness of breath. no chest pain

## 2018-04-12 NOTE — CONSULT NOTE ADULT - PROBLEM SELECTOR RECOMMENDATION 9
Will start low dose basal insulin as pt's sugars above goal.  Goal 140-180 inpt.   Will start 15 units lantus qHS.  Will start 4 units humalog tidac.  Will reduce from moderate to low SSI achs.    On discharge, we really need list of pt's meds.  This was not uploaded in her H/P and would be helpful to have to make modifications.  However, with regard to her metformin and trulicity which we know she's on: ok to continue metformin at 1000 mg bid as long as she has GFR >45. OK to continue truilcity.  She wasn't actually getting any of this med the past month as injecting incorrectly. Risk of nausea reviewed.

## 2018-04-12 NOTE — CHART NOTE - NSCHARTNOTEFT_GEN_A_CORE
MAR Accept Note    72F PMH HTN, HLD, T2DM transferred from [facility] on 4/10/18 to [facility] for STEMI.  Patient initially presented to OSH with chest pain with reported possible AWSTEMI and elevated cardiac enzymes. Patient loaded with ASA, plavix, and started on heparin gtt. Underwent cardiac cath on 4/10 with 99% occlusion in OM1 s/p EDWIGE. Patient was started on metoprolol, ASA, Brilinta and atorvastatin.  Patient remained HD stable and transferred to [facility] on 4/11.  Patient seen and examined, no acute complaints.     FOR FOLLOW UP:  [ ] F/u TTE ordered   [ ] A1c = 11.1 and elevated finger sticks, will need optimization of diabetes medications. Consider starting low dose Lantus while inpatient     Tonya John, PGY3  MAR spectra #[id number] MAR Accept Note    72F PMH HTN, HLD, T2DM transferred from [facility] on 4/10/18 to [facility]U for STEMI.  Patient initially presented to OSH with chest pain with reported possible AWSTEMI and elevated cardiac enzymes. Patient loaded with ASA, plavix, and started on heparin gtt. Underwent cardiac cath on 4/10 with 99% occlusion in OM1 s/p EDWIGE. Patient was started on metoprolol, ASA, Brilinta and atorvastatin.  Patient remained HD stable and transferred to [facility] on 4/11.  Patient seen and examined, no acute complaints.     FOR FOLLOW UP:  [ ] F/u TTE ordered   [ ] F/u cardiology recs  [ ] Consider starting low dose ACEI/ARB if BP tolerates   [ ] A1c = 11.1 and elevated finger sticks, will need optimization of diabetes medications. Consider starting low dose Lantus while inpatient     Tonya John, PGY3  MAR spectra #[id number]

## 2018-04-12 NOTE — PROGRESS NOTE ADULT - PROBLEM SELECTOR PLAN 2
hba1c 11.1 (on glimeperide and metformin at home)  monitor FS   will start lantus hba1c 11.1 (on glimeperide and metformin at home)  monitor FS   will start lantus and humalog   endocrine consult called

## 2018-04-12 NOTE — DIETITIAN INITIAL EVALUATION ADULT. - OTHER INFO
seen for HgbA1c 11 consult, admitted for chest pain, consuming 25->75% of meals dependant upon whether or not she likes the meal. she was not interested in having the diet office number to call if she was not satisfied with her meal, denies nausea/vomit, denies difficulty chewing /swallow. when she saw endocrinologist in January she was told that everything was fine and she did not need to return until June-encouraged pt to follow up sooner. she is on oral medication for diabetes at home. last BM-2 days ago, HERNAN

## 2018-04-13 VITALS
SYSTOLIC BLOOD PRESSURE: 113 MMHG | HEART RATE: 64 BPM | DIASTOLIC BLOOD PRESSURE: 64 MMHG | OXYGEN SATURATION: 98 % | RESPIRATION RATE: 17 BRPM | TEMPERATURE: 98 F

## 2018-04-13 LAB
ANION GAP SERPL CALC-SCNC: 13 MMOL/L — SIGNIFICANT CHANGE UP (ref 5–17)
BUN SERPL-MCNC: 22 MG/DL — SIGNIFICANT CHANGE UP (ref 7–23)
CALCIUM SERPL-MCNC: 10.4 MG/DL — SIGNIFICANT CHANGE UP (ref 8.4–10.5)
CHLORIDE SERPL-SCNC: 100 MMOL/L — SIGNIFICANT CHANGE UP (ref 96–108)
CO2 SERPL-SCNC: 22 MMOL/L — SIGNIFICANT CHANGE UP (ref 22–31)
CREAT SERPL-MCNC: 1.14 MG/DL — SIGNIFICANT CHANGE UP (ref 0.5–1.3)
GLUCOSE BLDC GLUCOMTR-MCNC: 229 MG/DL — HIGH (ref 70–99)
GLUCOSE BLDC GLUCOMTR-MCNC: 232 MG/DL — HIGH (ref 70–99)
GLUCOSE SERPL-MCNC: 245 MG/DL — HIGH (ref 70–99)
HCT VFR BLD CALC: 37 % — SIGNIFICANT CHANGE UP (ref 34.5–45)
HGB BLD-MCNC: 12.3 G/DL — SIGNIFICANT CHANGE UP (ref 11.5–15.5)
MCHC RBC-ENTMCNC: 29.1 PG — SIGNIFICANT CHANGE UP (ref 27–34)
MCHC RBC-ENTMCNC: 33.4 GM/DL — SIGNIFICANT CHANGE UP (ref 32–36)
MCV RBC AUTO: 87.2 FL — SIGNIFICANT CHANGE UP (ref 80–100)
PLATELET # BLD AUTO: 270 K/UL — SIGNIFICANT CHANGE UP (ref 150–400)
POTASSIUM SERPL-MCNC: 3.5 MMOL/L — SIGNIFICANT CHANGE UP (ref 3.5–5.3)
POTASSIUM SERPL-SCNC: 3.5 MMOL/L — SIGNIFICANT CHANGE UP (ref 3.5–5.3)
RBC # BLD: 4.24 M/UL — SIGNIFICANT CHANGE UP (ref 3.8–5.2)
RBC # FLD: 13.4 % — SIGNIFICANT CHANGE UP (ref 10.3–14.5)
SODIUM SERPL-SCNC: 135 MMOL/L — SIGNIFICANT CHANGE UP (ref 135–145)
WBC # BLD: 9.8 K/UL — SIGNIFICANT CHANGE UP (ref 3.8–10.5)
WBC # FLD AUTO: 9.8 K/UL — SIGNIFICANT CHANGE UP (ref 3.8–10.5)

## 2018-04-13 PROCEDURE — 82553 CREATINE MB FRACTION: CPT

## 2018-04-13 PROCEDURE — 85027 COMPLETE CBC AUTOMATED: CPT

## 2018-04-13 PROCEDURE — 82550 ASSAY OF CK (CPK): CPT

## 2018-04-13 PROCEDURE — 80048 BASIC METABOLIC PNL TOTAL CA: CPT

## 2018-04-13 PROCEDURE — 93458 L HRT ARTERY/VENTRICLE ANGIO: CPT | Mod: 59

## 2018-04-13 PROCEDURE — 86850 RBC ANTIBODY SCREEN: CPT

## 2018-04-13 PROCEDURE — 99232 SBSQ HOSP IP/OBS MODERATE 35: CPT

## 2018-04-13 PROCEDURE — C1894: CPT

## 2018-04-13 PROCEDURE — 86901 BLOOD TYPING SEROLOGIC RH(D): CPT

## 2018-04-13 PROCEDURE — C1725: CPT

## 2018-04-13 PROCEDURE — 84100 ASSAY OF PHOSPHORUS: CPT

## 2018-04-13 PROCEDURE — 93005 ELECTROCARDIOGRAM TRACING: CPT

## 2018-04-13 PROCEDURE — 99152 MOD SED SAME PHYS/QHP 5/>YRS: CPT

## 2018-04-13 PROCEDURE — 82962 GLUCOSE BLOOD TEST: CPT

## 2018-04-13 PROCEDURE — 80053 COMPREHEN METABOLIC PANEL: CPT

## 2018-04-13 PROCEDURE — C9606: CPT | Mod: LC

## 2018-04-13 PROCEDURE — 93306 TTE W/DOPPLER COMPLETE: CPT

## 2018-04-13 PROCEDURE — C1760: CPT

## 2018-04-13 PROCEDURE — C1887: CPT

## 2018-04-13 PROCEDURE — 80061 LIPID PANEL: CPT

## 2018-04-13 PROCEDURE — 83036 HEMOGLOBIN GLYCOSYLATED A1C: CPT

## 2018-04-13 PROCEDURE — 86900 BLOOD TYPING SEROLOGIC ABO: CPT

## 2018-04-13 PROCEDURE — 99239 HOSP IP/OBS DSCHRG MGMT >30: CPT

## 2018-04-13 PROCEDURE — 84443 ASSAY THYROID STIM HORMONE: CPT

## 2018-04-13 PROCEDURE — C1874: CPT

## 2018-04-13 PROCEDURE — C1769: CPT

## 2018-04-13 PROCEDURE — 93306 TTE W/DOPPLER COMPLETE: CPT | Mod: 26

## 2018-04-13 PROCEDURE — 84484 ASSAY OF TROPONIN QUANT: CPT

## 2018-04-13 PROCEDURE — 83735 ASSAY OF MAGNESIUM: CPT

## 2018-04-13 RX ORDER — INSULIN GLARGINE 100 [IU]/ML
18 INJECTION, SOLUTION SUBCUTANEOUS AT BEDTIME
Qty: 0 | Refills: 0 | Status: DISCONTINUED | OUTPATIENT
Start: 2018-04-13 | End: 2018-04-13

## 2018-04-13 RX ORDER — NYSTATIN CREAM 100000 [USP'U]/G
1 CREAM TOPICAL
Qty: 1 | Refills: 0
Start: 2018-04-13 | End: 2018-05-12

## 2018-04-13 RX ORDER — INSULIN LISPRO 100/ML
VIAL (ML) SUBCUTANEOUS
Qty: 0 | Refills: 0 | Status: DISCONTINUED | OUTPATIENT
Start: 2018-04-13 | End: 2018-04-13

## 2018-04-13 RX ORDER — GLIMEPIRIDE 1 MG
1 TABLET ORAL
Qty: 0 | Refills: 0 | COMMUNITY

## 2018-04-13 RX ORDER — LISINOPRIL 2.5 MG/1
1 TABLET ORAL
Qty: 30 | Refills: 0 | OUTPATIENT
Start: 2018-04-13 | End: 2018-05-12

## 2018-04-13 RX ORDER — INSULIN LISPRO 100/ML
VIAL (ML) SUBCUTANEOUS AT BEDTIME
Qty: 0 | Refills: 0 | Status: DISCONTINUED | OUTPATIENT
Start: 2018-04-13 | End: 2018-04-13

## 2018-04-13 RX ORDER — ATENOLOL 25 MG/1
1 TABLET ORAL
Qty: 0 | Refills: 0 | COMMUNITY

## 2018-04-13 RX ORDER — CLOPIDOGREL BISULFATE 75 MG/1
1 TABLET, FILM COATED ORAL
Qty: 30 | Refills: 0
Start: 2018-04-13 | End: 2018-05-12

## 2018-04-13 RX ORDER — LISINOPRIL 2.5 MG/1
2 TABLET ORAL
Qty: 60 | Refills: 0
Start: 2018-04-13 | End: 2018-05-12

## 2018-04-13 RX ORDER — TICAGRELOR 90 MG/1
1 TABLET ORAL
Qty: 60 | Refills: 0 | OUTPATIENT
Start: 2018-04-13 | End: 2018-05-12

## 2018-04-13 RX ORDER — INSULIN LISPRO 100/ML
6 VIAL (ML) SUBCUTANEOUS
Qty: 0 | Refills: 0 | Status: DISCONTINUED | OUTPATIENT
Start: 2018-04-13 | End: 2018-04-13

## 2018-04-13 RX ORDER — VALSARTAN 80 MG/1
1 TABLET ORAL
Qty: 0 | Refills: 0 | COMMUNITY

## 2018-04-13 RX ORDER — METOPROLOL TARTRATE 50 MG
1 TABLET ORAL
Qty: 30 | Refills: 0
Start: 2018-04-13 | End: 2018-05-12

## 2018-04-13 RX ADMIN — NYSTATIN CREAM 1 APPLICATION(S): 100000 CREAM TOPICAL at 05:21

## 2018-04-13 RX ADMIN — HEPARIN SODIUM 5000 UNIT(S): 5000 INJECTION INTRAVENOUS; SUBCUTANEOUS at 13:00

## 2018-04-13 RX ADMIN — Medication 4 UNIT(S): at 09:42

## 2018-04-13 RX ADMIN — Medication 12.5 MILLIGRAM(S): at 06:20

## 2018-04-13 RX ADMIN — Medication 6 UNIT(S): at 13:01

## 2018-04-13 RX ADMIN — LISINOPRIL 2.5 MILLIGRAM(S): 2.5 TABLET ORAL at 13:00

## 2018-04-13 RX ADMIN — TICAGRELOR 90 MILLIGRAM(S): 90 TABLET ORAL at 05:21

## 2018-04-13 RX ADMIN — Medication 2: at 09:41

## 2018-04-13 RX ADMIN — HEPARIN SODIUM 5000 UNIT(S): 5000 INJECTION INTRAVENOUS; SUBCUTANEOUS at 05:21

## 2018-04-13 RX ADMIN — Medication 81 MILLIGRAM(S): at 09:42

## 2018-04-13 RX ADMIN — Medication 4: at 12:59

## 2018-04-13 NOTE — PROGRESS NOTE ADULT - PROBLEM SELECTOR PLAN 3
Mgmt per cardiology but agree with statin use.
-Will increase lisinopril to 5mg daily on discharge.   -Will change metoprolol to 25mg ER daily on discharge.
monitor bp   c/w lopressor, lisinopril

## 2018-04-13 NOTE — CHART NOTE - NSCHARTNOTEFT_GEN_A_CORE
Asked by attending to expedite discharge home. Discussed plan with cardiology; Approved use of plavix instead of brillanta since pt with high co pay. Discussed with Endocrine attending: pt to continue prandin, Trulicity, metformin at home. Discharged home with cardiology, endocrine follow up.

## 2018-04-13 NOTE — PROGRESS NOTE ADULT - PROBLEM SELECTOR PROBLEM 3
Essential hypertension
ST elevation myocardial infarction (STEMI) involving other coronary artery
Essential hypertension

## 2018-04-13 NOTE — PROGRESS NOTE ADULT - PROBLEM SELECTOR PROBLEM 1
ST elevation myocardial infarction (STEMI) involving other coronary artery
Uncontrolled type 2 diabetes mellitus with hyperglycemia, without long-term current use of insulin
ST elevation myocardial infarction (STEMI) involving other coronary artery

## 2018-04-13 NOTE — PROGRESS NOTE ADULT - SUBJECTIVE AND OBJECTIVE BOX
Chief Complaint: type 2 diabetes     Interval history: Pt doing well, appetite good.  Says she is     MEDICATIONS  (STANDING):  aspirin enteric coated 81 milliGRAM(s) Oral daily  atorvastatin 80 milliGRAM(s) Oral at bedtime  dextrose 5%. 1000 milliLiter(s) (50 mL/Hr) IV Continuous <Continuous>  dextrose 50% Injectable 12.5 Gram(s) IV Push once  dextrose 50% Injectable 25 Gram(s) IV Push once  dextrose 50% Injectable 25 Gram(s) IV Push once  heparin  Injectable 5000 Unit(s) SubCutaneous every 8 hours  insulin glargine Injectable (LANTUS) 18 Unit(s) SubCutaneous at bedtime  insulin lispro (HumaLOG) corrective regimen sliding scale   SubCutaneous three times a day before meals  insulin lispro (HumaLOG) corrective regimen sliding scale   SubCutaneous at bedtime  insulin lispro Injectable (HumaLOG) 6 Unit(s) SubCutaneous three times a day before meals  lisinopril 2.5 milliGRAM(s) Oral daily  metoprolol tartrate 12.5 milliGRAM(s) Oral two times a day  nystatin Powder 1 Application(s) Topical two times a day  ticagrelor 90 milliGRAM(s) Oral two times a day    MEDICATIONS  (PRN):  dextrose Gel 1 Dose(s) Oral once PRN Blood Glucose LESS THAN 70 milliGRAM(s)/deciliter  glucagon  Injectable 1 milliGRAM(s) IntraMuscular once PRN Glucose LESS THAN 70 milligrams/deciliter      Allergies    No Known Allergies    Intolerances      Review of Systems:  Skin: no rash  Endocrine: no polyuria, polydipsia    ALL OTHER SYSTEMS REVIEWED AND NEGATIVE    PHYSICAL EXAM:  VITALS: T(C): 36.8 (04-13-18 @ 12:14)  T(F): 98.3 (04-13-18 @ 12:14), Max: 99.6 (04-12-18 @ 21:25)  HR: 64 (04-13-18 @ 12:14) (60 - 73)  BP: 113/64 (04-13-18 @ 12:14) (97/59 - 113/64)  RR:  (17 - 18)  SpO2:  (96% - 98%)  Wt(kg): --  GENERAL: NAD, well-developed  EYES: No proptosis, sclera anicteric  HEENT:  Atraumatic, Normocephalic, moist mucous membranes  SKIN: Dry, intact, No diffuse rashes   PSYCH: Alert and oriented x 3, normal affect, normal mood    POCT Blood Glucose.: 229 mg/dL (04-13-18 @ 12:39)  POCT Blood Glucose.: 232 mg/dL (04-13-18 @ 09:05)  POCT Blood Glucose.: 348 mg/dL (04-12-18 @ 22:13)  POCT Blood Glucose.: 287 mg/dL (04-12-18 @ 17:52)  POCT Blood Glucose.: 291 mg/dL (04-12-18 @ 12:47)  POCT Blood Glucose.: 216 mg/dL (04-12-18 @ 08:36)  POCT Blood Glucose.: 297 mg/dL (04-11-18 @ 21:41)  POCT Blood Glucose.: 243 mg/dL (04-11-18 @ 17:23)  POCT Blood Glucose.: 247 mg/dL (04-11-18 @ 12:01)  POCT Blood Glucose.: 155 mg/dL (04-11-18 @ 08:11)  POCT Blood Glucose.: 238 mg/dL (04-10-18 @ 23:04)  POCT Blood Glucose.: 231 mg/dL (04-10-18 @ 19:51)      04-13    135  |  100  |  22  ----------------------------<  245<H>  3.5   |  22  |  1.14    EGFR if : 56<L>  EGFR if non : 49<L>    Ca    10.4      04-13  Mg     2.2     04-12  Phos  2.5     04-12    TPro  6.9  /  Alb  3.7  /  TBili  0.6  /  DBili  x   /  AST  145<H>  /  ALT  28  /  AlkPhos  106  04-11          Thyroid Function Tests:  04-11 @ 05:29 TSH 2.43 FreeT4 -- T3 -- Anti TPO -- Anti Thyroglobulin Ab -- TSI --  04-10 @ 22:55 TSH 1.78 FreeT4 -- T3 -- Anti TPO -- Anti Thyroglobulin Ab -- TSI --      Hemoglobin A1C, Whole Blood: 11.1 % <H> [4.0 - 5.6] (04-11-18 @ 05:29) Chief Complaint: type 2 diabetes     Interval history: Pt doing well, appetite good.  Says she is eating well and no acute complaints.  I had reviewed her paper chart to try to find the sheet with her medication list in it, and it was not there (she thinks it may have remained at the prior hosptial where she was seen and transferred here).  Regardless, she did check on her meds and now can tell me what she was taking. She again reiterates that she was on valsartan and metformin, but also notes that she was on glimepiride either 1 or 2 mg qDAILY but can't remember dose. Also has been on farxiga at one point in the past but has not been on it lately as she was only on it briefly after given samples by her PCP.     MEDICATIONS  (STANDING):  aspirin enteric coated 81 milliGRAM(s) Oral daily  atorvastatin 80 milliGRAM(s) Oral at bedtime  dextrose 5%. 1000 milliLiter(s) (50 mL/Hr) IV Continuous <Continuous>  dextrose 50% Injectable 12.5 Gram(s) IV Push once  dextrose 50% Injectable 25 Gram(s) IV Push once  dextrose 50% Injectable 25 Gram(s) IV Push once  heparin  Injectable 5000 Unit(s) SubCutaneous every 8 hours  insulin glargine Injectable (LANTUS) 18 Unit(s) SubCutaneous at bedtime  insulin lispro (HumaLOG) corrective regimen sliding scale   SubCutaneous three times a day before meals  insulin lispro (HumaLOG) corrective regimen sliding scale   SubCutaneous at bedtime  insulin lispro Injectable (HumaLOG) 6 Unit(s) SubCutaneous three times a day before meals  lisinopril 2.5 milliGRAM(s) Oral daily  metoprolol tartrate 12.5 milliGRAM(s) Oral two times a day  nystatin Powder 1 Application(s) Topical two times a day  ticagrelor 90 milliGRAM(s) Oral two times a day    MEDICATIONS  (PRN):  dextrose Gel 1 Dose(s) Oral once PRN Blood Glucose LESS THAN 70 milliGRAM(s)/deciliter  glucagon  Injectable 1 milliGRAM(s) IntraMuscular once PRN Glucose LESS THAN 70 milligrams/deciliter      Allergies    No Known Allergies    Intolerances      Review of Systems:  Skin: no rash  Endocrine: no polyuria, polydipsia    ALL OTHER SYSTEMS REVIEWED AND NEGATIVE    PHYSICAL EXAM:  VITALS: T(C): 36.8 (04-13-18 @ 12:14)  T(F): 98.3 (04-13-18 @ 12:14), Max: 99.6 (04-12-18 @ 21:25)  HR: 64 (04-13-18 @ 12:14) (60 - 73)  BP: 113/64 (04-13-18 @ 12:14) (97/59 - 113/64)  RR:  (17 - 18)  SpO2:  (96% - 98%)  Wt(kg): --  GENERAL: NAD, well-developed  EYES: No proptosis, sclera anicteric  HEENT:  Atraumatic, Normocephalic, moist mucous membranes  SKIN: Dry, intact, No diffuse rashes   PSYCH: Alert and oriented x 3, normal affect, normal mood    POCT Blood Glucose.: 229 mg/dL (04-13-18 @ 12:39)  POCT Blood Glucose.: 232 mg/dL (04-13-18 @ 09:05)  POCT Blood Glucose.: 348 mg/dL (04-12-18 @ 22:13)  POCT Blood Glucose.: 287 mg/dL (04-12-18 @ 17:52)  POCT Blood Glucose.: 291 mg/dL (04-12-18 @ 12:47)  POCT Blood Glucose.: 216 mg/dL (04-12-18 @ 08:36)  POCT Blood Glucose.: 297 mg/dL (04-11-18 @ 21:41)  POCT Blood Glucose.: 243 mg/dL (04-11-18 @ 17:23)  POCT Blood Glucose.: 247 mg/dL (04-11-18 @ 12:01)  POCT Blood Glucose.: 155 mg/dL (04-11-18 @ 08:11)  POCT Blood Glucose.: 238 mg/dL (04-10-18 @ 23:04)  POCT Blood Glucose.: 231 mg/dL (04-10-18 @ 19:51)      04-13    135  |  100  |  22  ----------------------------<  245<H>  3.5   |  22  |  1.14    EGFR if : 56<L>  EGFR if non : 49<L>    Ca    10.4      04-13  Mg     2.2     04-12  Phos  2.5     04-12    TPro  6.9  /  Alb  3.7  /  TBili  0.6  /  DBili  x   /  AST  145<H>  /  ALT  28  /  AlkPhos  106  04-11          Thyroid Function Tests:  04-11 @ 05:29 TSH 2.43 FreeT4 -- T3 -- Anti TPO -- Anti Thyroglobulin Ab -- TSI --  04-10 @ 22:55 TSH 1.78 FreeT4 -- T3 -- Anti TPO -- Anti Thyroglobulin Ab -- TSI --      Hemoglobin A1C, Whole Blood: 11.1 % <H> [4.0 - 5.6] (04-11-18 @ 05:29)

## 2018-04-13 NOTE — PROGRESS NOTE ADULT - PROBLEM SELECTOR PLAN 2
-C/w lantus and lispro while inpatient.   -Appreciate endocrine recs for outpatient DC regimen (per their note from today).   -Endo and PMD follow up outpatient.  -Diabetic DASH diet.  -HbA1c 11.1.

## 2018-04-13 NOTE — PROGRESS NOTE ADULT - PROBLEM SELECTOR PLAN 5
dvt proph - hsq
-C/w heparin SQ for DVT PPx.     6. Dispo: -DC patient to home today with outpatient cardiology and PMD follow up. 36 minutes spent on coordinating the discharge process.

## 2018-04-13 NOTE — PROGRESS NOTE ADULT - SUBJECTIVE AND OBJECTIVE BOX
Patient is a 72y old  Female who presents with a chief complaint of Chest pain (12 Apr 2018 17:28)        SUBJECTIVE / OVERNIGHT EVENTS:      MEDICATIONS  (STANDING):  aspirin enteric coated 81 milliGRAM(s) Oral daily  atorvastatin 80 milliGRAM(s) Oral at bedtime  dextrose 5%. 1000 milliLiter(s) (50 mL/Hr) IV Continuous <Continuous>  dextrose 50% Injectable 12.5 Gram(s) IV Push once  dextrose 50% Injectable 25 Gram(s) IV Push once  dextrose 50% Injectable 25 Gram(s) IV Push once  heparin  Injectable 5000 Unit(s) SubCutaneous every 8 hours  insulin glargine Injectable (LANTUS) 18 Unit(s) SubCutaneous at bedtime  insulin lispro (HumaLOG) corrective regimen sliding scale   SubCutaneous three times a day before meals  insulin lispro (HumaLOG) corrective regimen sliding scale   SubCutaneous at bedtime  insulin lispro Injectable (HumaLOG) 6 Unit(s) SubCutaneous three times a day before meals  lisinopril 2.5 milliGRAM(s) Oral daily  metoprolol tartrate 12.5 milliGRAM(s) Oral two times a day  nystatin Powder 1 Application(s) Topical two times a day  ticagrelor 90 milliGRAM(s) Oral two times a day    MEDICATIONS  (PRN):  dextrose Gel 1 Dose(s) Oral once PRN Blood Glucose LESS THAN 70 milliGRAM(s)/deciliter  glucagon  Injectable 1 milliGRAM(s) IntraMuscular once PRN Glucose LESS THAN 70 milligrams/deciliter      Vital Signs Last 24 Hrs  T(C): 36.8 (13 Apr 2018 12:14), Max: 37.6 (12 Apr 2018 21:25)  T(F): 98.3 (13 Apr 2018 12:14), Max: 99.6 (12 Apr 2018 21:25)  HR: 64 (13 Apr 2018 12:14) (60 - 73)  BP: 113/64 (13 Apr 2018 12:14) (97/59 - 113/64)  BP(mean): --  RR: 17 (13 Apr 2018 12:14) (17 - 18)  SpO2: 98% (13 Apr 2018 12:14) (96% - 98%)  CAPILLARY BLOOD GLUCOSE      POCT Blood Glucose.: 229 mg/dL (13 Apr 2018 12:39)  POCT Blood Glucose.: 232 mg/dL (13 Apr 2018 09:05)  POCT Blood Glucose.: 348 mg/dL (12 Apr 2018 22:13)    I&O's Summary    12 Apr 2018 07:01  -  13 Apr 2018 07:00  --------------------------------------------------------  IN: 1230 mL / OUT: 0 mL / NET: 1230 mL    13 Apr 2018 07:01  -  13 Apr 2018 19:16  --------------------------------------------------------  IN: 240 mL / OUT: 200 mL / NET: 40 mL          PHYSICAL EXAM  GENERAL: NAD, well-developed  HEAD:  Atraumatic, Normocephalic  EYES: EOMI, PERRLA, conjunctiva and sclera clear  NECK: Supple, No JVD  CHEST/LUNG: Clear to auscultation bilaterally; No wheeze  HEART: Regular rate and rhythm; No murmurs, rubs, or gallops  ABDOMEN: Soft, Nontender, Nondistended; Bowel sounds present  EXTREMITIES:  2+ Peripheral Pulses, No clubbing, cyanosis, or edema  PSYCH: AAOx3  SKIN: No rashes or lesions    LABS:                        12.3   9.8   )-----------( 270      ( 13 Apr 2018 06:43 )             37.0     04-13    135  |  100  |  22  ----------------------------<  245<H>  3.5   |  22  |  1.14    Ca    10.4      13 Apr 2018 06:43  Phos  2.5     04-12  Mg     2.2     04-12                RADIOLOGY & ADDITIONAL TESTS:    Imaging Personally Reviewed:  Consultant(s) Notes Reviewed:    Care Discussed with Consultants/Other Providers: -Patient seen and examined on 4/13/18.       Patient is a 72y old  Female who presents with a chief complaint of Chest pain (12 Apr 2018 17:28)        SUBJECTIVE / OVERNIGHT EVENTS:      MEDICATIONS  (STANDING):  aspirin enteric coated 81 milliGRAM(s) Oral daily  atorvastatin 80 milliGRAM(s) Oral at bedtime  dextrose 5%. 1000 milliLiter(s) (50 mL/Hr) IV Continuous <Continuous>  dextrose 50% Injectable 12.5 Gram(s) IV Push once  dextrose 50% Injectable 25 Gram(s) IV Push once  dextrose 50% Injectable 25 Gram(s) IV Push once  heparin  Injectable 5000 Unit(s) SubCutaneous every 8 hours  insulin glargine Injectable (LANTUS) 18 Unit(s) SubCutaneous at bedtime  insulin lispro (HumaLOG) corrective regimen sliding scale   SubCutaneous three times a day before meals  insulin lispro (HumaLOG) corrective regimen sliding scale   SubCutaneous at bedtime  insulin lispro Injectable (HumaLOG) 6 Unit(s) SubCutaneous three times a day before meals  lisinopril 2.5 milliGRAM(s) Oral daily  metoprolol tartrate 12.5 milliGRAM(s) Oral two times a day  nystatin Powder 1 Application(s) Topical two times a day  ticagrelor 90 milliGRAM(s) Oral two times a day    MEDICATIONS  (PRN):  dextrose Gel 1 Dose(s) Oral once PRN Blood Glucose LESS THAN 70 milliGRAM(s)/deciliter  glucagon  Injectable 1 milliGRAM(s) IntraMuscular once PRN Glucose LESS THAN 70 milligrams/deciliter      Vital Signs Last 24 Hrs  T(C): 36.8 (13 Apr 2018 12:14), Max: 37.6 (12 Apr 2018 21:25)  T(F): 98.3 (13 Apr 2018 12:14), Max: 99.6 (12 Apr 2018 21:25)  HR: 64 (13 Apr 2018 12:14) (60 - 73)  BP: 113/64 (13 Apr 2018 12:14) (97/59 - 113/64)  BP(mean): --  RR: 17 (13 Apr 2018 12:14) (17 - 18)  SpO2: 98% (13 Apr 2018 12:14) (96% - 98%)  CAPILLARY BLOOD GLUCOSE      POCT Blood Glucose.: 229 mg/dL (13 Apr 2018 12:39)  POCT Blood Glucose.: 232 mg/dL (13 Apr 2018 09:05)  POCT Blood Glucose.: 348 mg/dL (12 Apr 2018 22:13)    I&O's Summary    12 Apr 2018 07:01  -  13 Apr 2018 07:00  --------------------------------------------------------  IN: 1230 mL / OUT: 0 mL / NET: 1230 mL    13 Apr 2018 07:01  -  13 Apr 2018 19:16  --------------------------------------------------------  IN: 240 mL / OUT: 200 mL / NET: 40 mL          PHYSICAL EXAM  GENERAL: NAD, well-developed  HEAD:  Atraumatic, Normocephalic  EYES: EOMI, PERRLA, conjunctiva and sclera clear  NECK: Supple, No JVD  CHEST/LUNG: Clear to auscultation bilaterally; No wheeze  HEART: Regular rate and rhythm; No murmurs, rubs, or gallops  ABDOMEN: Soft, Nontender, Nondistended; Bowel sounds present  EXTREMITIES:  2+ Peripheral Pulses, No clubbing, cyanosis, or edema  PSYCH: AAOx3  SKIN: No rashes or lesions    LABS:                        12.3   9.8   )-----------( 270      ( 13 Apr 2018 06:43 )             37.0     04-13    135  |  100  |  22  ----------------------------<  245<H>  3.5   |  22  |  1.14    Ca    10.4      13 Apr 2018 06:43  Phos  2.5     04-12  Mg     2.2     04-12                RADIOLOGY & ADDITIONAL TESTS:    Imaging Personally Reviewed:  Consultant(s) Notes Reviewed:    Care Discussed with Consultants/Other Providers: -Patient seen and examined on 4/13/18.       Patient is a 72y old  Female who presents with a chief complaint of Chest pain (12 Apr 2018 17:28)        SUBJECTIVE / OVERNIGHT EVENTS: Patient denies CP or SOB or N/V. She says she feels well.       MEDICATIONS  (STANDING):  aspirin enteric coated 81 milliGRAM(s) Oral daily  atorvastatin 80 milliGRAM(s) Oral at bedtime  dextrose 5%. 1000 milliLiter(s) (50 mL/Hr) IV Continuous <Continuous>  dextrose 50% Injectable 12.5 Gram(s) IV Push once  dextrose 50% Injectable 25 Gram(s) IV Push once  dextrose 50% Injectable 25 Gram(s) IV Push once  heparin  Injectable 5000 Unit(s) SubCutaneous every 8 hours  insulin glargine Injectable (LANTUS) 18 Unit(s) SubCutaneous at bedtime  insulin lispro (HumaLOG) corrective regimen sliding scale   SubCutaneous three times a day before meals  insulin lispro (HumaLOG) corrective regimen sliding scale   SubCutaneous at bedtime  insulin lispro Injectable (HumaLOG) 6 Unit(s) SubCutaneous three times a day before meals  lisinopril 2.5 milliGRAM(s) Oral daily  metoprolol tartrate 12.5 milliGRAM(s) Oral two times a day  nystatin Powder 1 Application(s) Topical two times a day  ticagrelor 90 milliGRAM(s) Oral two times a day    MEDICATIONS  (PRN):  dextrose Gel 1 Dose(s) Oral once PRN Blood Glucose LESS THAN 70 milliGRAM(s)/deciliter  glucagon  Injectable 1 milliGRAM(s) IntraMuscular once PRN Glucose LESS THAN 70 milligrams/deciliter      Vital Signs Last 24 Hrs  T(C): 36.8 (13 Apr 2018 12:14), Max: 37.6 (12 Apr 2018 21:25)  T(F): 98.3 (13 Apr 2018 12:14), Max: 99.6 (12 Apr 2018 21:25)  HR: 64 (13 Apr 2018 12:14) (60 - 73)  BP: 113/64 (13 Apr 2018 12:14) (97/59 - 113/64)  BP(mean): --  RR: 17 (13 Apr 2018 12:14) (17 - 18)  SpO2: 98% (13 Apr 2018 12:14) (96% - 98%)  CAPILLARY BLOOD GLUCOSE      POCT Blood Glucose.: 229 mg/dL (13 Apr 2018 12:39)  POCT Blood Glucose.: 232 mg/dL (13 Apr 2018 09:05)  POCT Blood Glucose.: 348 mg/dL (12 Apr 2018 22:13)    I&O's Summary    12 Apr 2018 07:01  -  13 Apr 2018 07:00  --------------------------------------------------------  IN: 1230 mL / OUT: 0 mL / NET: 1230 mL    13 Apr 2018 07:01  -  13 Apr 2018 19:16  --------------------------------------------------------  IN: 240 mL / OUT: 200 mL / NET: 40 mL          PHYSICAL EXAM:  GENERAL: NAD, well-developed  HEAD: Atraumatic, Normocephalic  EYES: EOMI, PERRLA, conjunctiva and sclera clear  NECK: Supple  CHEST/LUNG: Clear to auscultation bilaterally; No wheeze  HEART: Regular rate and rhythm; No murmurs, rubs, or gallops  ABDOMEN: Soft, overweight, Nontender, Nondistended; Bowel sounds present  EXTREMITIES: Trace edema in LE's at ankles.   PSYCH/Neuro: AAOx3. Non-focal.   SKIN: No rashes or lesions      LABS:                        12.3   9.8   )-----------( 270      ( 13 Apr 2018 06:43 )             37.0     04-13    135  |  100  |  22  ----------------------------<  245<H>  3.5   |  22  |  1.14    Ca    10.4      13 Apr 2018 06:43  Phos  2.5     04-12  Mg     2.2     04-12      < from: Transthoracic Echocardiogram (04.13.18 @ 07:59) >  Conclusions:  1. Mild mitral regurgitation.  2. Normal left ventricular internal dimensions and wall  thicknesses.  3. Mild segmental left ventricular systolic dysfunction.  The apical lateral wall, and the mid anterolateral wall are  akinetic.  4. Increased E/e'  is consistent with elevated left  ventricular filling pressure.  5. Normal right ventricular size and function.  6. Mild tricuspid regurgitation.  7. Mild to moderate pulmonic regurgitation.    < end of copied text >      Telemetry reviewed: Sinus 60-80's.     RADIOLOGY & ADDITIONAL TESTS:    Imaging Personally Reviewed: Echo report.   Consultant(s) Notes Reviewed:  Endocrine and cardiology  Care Discussed with Consultants/Other Providers:

## 2018-04-13 NOTE — PROGRESS NOTE ADULT - PROBLEM SELECTOR PROBLEM 2
Type 2 diabetes mellitus with hyperglycemia, unspecified long term insulin use status
Hyperlipidemia, unspecified hyperlipidemia type
Type 2 diabetes mellitus with hyperglycemia, without long-term current use of insulin

## 2018-04-13 NOTE — PROGRESS NOTE ADULT - PROBLEM SELECTOR PLAN 1
Will increase current doses of insulin as pt's sugasr a bit above goal.  Goal 140-180 inpt.   Will increase from 15 to 18 units lantus qHS.  Will increase from 4 to 6 units humalog tidac.  Will increase back to moderate  SSI achs but once sugars consistently in the 100s, could be reduced back to low SSI achs to prevent low BG.     On discharge, ok to continue her home 1000 mg bid metformin as long as she has GFR >45. OK to continue truilcity weekly.  She wasn't actually getting any of this med the past month as injecting incorrectly. Risk of nausea reviewed.  OK to continue her glimepiride as her A1c is quite above goal - risk is hypoglycemia when combined with GLP1 (or any med for that matter).  If she would like to see us in clinic, our f/u number for our [facility] office in [place] is [phone]. Will increase current doses of insulin as pt's sugasr a bit above goal.  Goal 140-180 inpt.   Will increase from 15 to 18 units lantus qHS.  Will increase from 4 to 6 units humalog tidac.  Will increase back to moderate  SSI achs but once sugars consistently in the 100s, could be reduced back to low SSI achs to prevent low BG.     On discharge, ok to continue her home 1000 mg bid metformin as long as she has GFR >45. OK to continue truilcity weekly.  She wasn't actually getting any of this med the past month as injecting incorrectly. Risk of nausea reviewed.  OK to continue her glimepiride as her A1c is quite above goal - risk is hypoglycemia when combined with GLP1 (or any med for that matter).  If she would like to see us in clinic, our f/[facility] number [facility] in [place] is [phone].    ADDENDUM: notified by team that they called pt's outpt provider (sees ashley) and that pt is on prandin rather than glimepiride. OK to send on prandin in that case on discharge.

## 2018-04-13 NOTE — PROGRESS NOTE ADULT - PROBLEM SELECTOR PLAN 1
-Patient says she feels well today.   -C/w ASA 81mg daily.   -Patient unable to afford Brillinta, so will change to Plavix 75mg daily on discharge.   -Will increase lisinopril to 5mg daily on discharge.   -Will change metoprolol to 25mg ER daily on discharge.   -C/w atorvastatin 80mg at bedtime.  -Diabetic DASH diet.   -Echo showed mild segmental LV systolic dysfunction.  -No events on telemetry.   -Cardio recs appreciated.

## 2018-04-13 NOTE — PROGRESS NOTE ADULT - ASSESSMENT
72F PMH HTN, HLDL, DMII presenting with acute STEMI now s/p PCI and EDWIGE in OM1 now no longer with chest pain.    # Neuro  - Pt with no acute neuro issues    # Cards  - c/w asa and ticagrelor  - Atorvastatin started  - pt takes atenolol at home, will monitor and add bb  - Pt has history of high bp, currently SBP in 120s.  Pt takes valsartan at home, will add BB or captopril if needed, currently still with low BP.  - Bedrest for now, pt can sit up later tonight.    # Pulm  - No acute issues    # GI  - DASH diet    #   - Voiding well, will monitor UOP    # ID  - No s/s of infection    # Heme  - On DAPT  - Will add SQH today    # Endo  - Pt with history of DMII, taking metformin and glimiperide at home  - Will hold oral agents in hospital  - ISS    # DVT ppx  - Will add SQH
72F h/o HTN, DM2, transfer from [facility] on 04/10/2018 with chest pain, found to have STEMI s/p EDWIGE to OM1.
73 yo female here s/p STEMI, with endocrine consulted for DM2 after A1c was obtained and found to be 11.1.  She is on metformin and 2 other orals she thinks (although she can not tell me names) but additionally just started trulicity 1 month ago (although later found to be injecting incorrectly).  I took a trulicity demo pen and instructed her how to use; discovered that she was pressing the green button onto her skin and discharging the medication into the air (not getting any of it).
72 year old female with PMH of HTN, DM-2; transferred from [facility] on 04/10/2018 with chest pain, found to have STEMI s/p EDWIGE to OM1.

## 2018-04-13 NOTE — PROGRESS NOTE ADULT - ATTENDING COMMENTS
Jose Francisco Day MD  Division of [facility]  Cell: [phone]  Pager: [phone]  Office: [phone] -Patient seen and examined on 4/13/18.     Jose Francisco Day MD  Division of Hospital Medicine  Cell: [phone]  Pager: [phone]  Office: [phone]

## 2018-04-14 DIAGNOSIS — E11.65 TYPE 2 DIABETES MELLITUS WITH HYPERGLYCEMIA: ICD-10-CM

## 2019-04-29 NOTE — ED ADULT NURSE NOTE - OBJECTIVE STATEMENT
Speaking Coherently
pt presented to the ed c/o of chest pain  with on episode of vomiting that started around 2 pm today.  Pt reported dizziness that has subsided. Ches pain persisted with a lower intensity now. Pt is placed on a cardiac monitor

## 2023-03-08 ENCOUNTER — INPATIENT (INPATIENT)
Facility: HOSPITAL | Age: 78
LOS: 1 days | Discharge: HOME | End: 2023-03-10
Attending: STUDENT IN AN ORGANIZED HEALTH CARE EDUCATION/TRAINING PROGRAM | Admitting: STUDENT IN AN ORGANIZED HEALTH CARE EDUCATION/TRAINING PROGRAM
Payer: MEDICARE

## 2023-03-08 VITALS
SYSTOLIC BLOOD PRESSURE: 128 MMHG | HEART RATE: 69 BPM | TEMPERATURE: 98 F | DIASTOLIC BLOOD PRESSURE: 64 MMHG | HEIGHT: 66 IN | OXYGEN SATURATION: 97 % | RESPIRATION RATE: 19 BRPM | WEIGHT: 203.93 LBS

## 2023-03-08 DIAGNOSIS — I25.10 ATHEROSCLEROTIC HEART DISEASE OF NATIVE CORONARY ARTERY WITHOUT ANGINA PECTORIS: ICD-10-CM

## 2023-03-08 DIAGNOSIS — D64.9 ANEMIA, UNSPECIFIED: ICD-10-CM

## 2023-03-08 DIAGNOSIS — E11.8 TYPE 2 DIABETES MELLITUS WITH UNSPECIFIED COMPLICATIONS: ICD-10-CM

## 2023-03-08 DIAGNOSIS — Z98.891 HISTORY OF UTERINE SCAR FROM PREVIOUS SURGERY: Chronic | ICD-10-CM

## 2023-03-08 DIAGNOSIS — R18.0 MALIGNANT ASCITES: ICD-10-CM

## 2023-03-08 DIAGNOSIS — N17.9 ACUTE KIDNEY FAILURE, UNSPECIFIED: ICD-10-CM

## 2023-03-08 DIAGNOSIS — E78.5 HYPERLIPIDEMIA, UNSPECIFIED: ICD-10-CM

## 2023-03-08 DIAGNOSIS — I10 ESSENTIAL (PRIMARY) HYPERTENSION: ICD-10-CM

## 2023-03-08 DIAGNOSIS — C56.9 MALIGNANT NEOPLASM OF UNSPECIFIED OVARY: ICD-10-CM

## 2023-03-08 PROBLEM — E11.9 TYPE 2 DIABETES MELLITUS WITHOUT COMPLICATIONS: Chronic | Status: ACTIVE | Noted: 2018-04-10

## 2023-03-08 PROBLEM — E11.69 TYPE 2 DIABETES MELLITUS WITH OTHER SPECIFIED COMPLICATION: Chronic | Status: ACTIVE | Noted: 2018-04-10

## 2023-03-08 LAB
ALBUMIN SERPL ELPH-MCNC: 2.7 G/DL — LOW (ref 3.3–5)
ALP SERPL-CCNC: 125 U/L — HIGH (ref 40–120)
ALT FLD-CCNC: 13 U/L — SIGNIFICANT CHANGE UP (ref 12–78)
ANION GAP SERPL CALC-SCNC: 5 MMOL/L — SIGNIFICANT CHANGE UP (ref 5–17)
APTT BLD: 27.8 SEC — SIGNIFICANT CHANGE UP (ref 27.5–35.5)
AST SERPL-CCNC: 29 U/L — SIGNIFICANT CHANGE UP (ref 15–37)
BILIRUB SERPL-MCNC: 0.3 MG/DL — SIGNIFICANT CHANGE UP (ref 0.2–1.2)
BLD GP AB SCN SERPL QL: SIGNIFICANT CHANGE UP
BUN SERPL-MCNC: 63 MG/DL — HIGH (ref 7–23)
CALCIUM SERPL-MCNC: 9.7 MG/DL — SIGNIFICANT CHANGE UP (ref 8.5–10.1)
CHLORIDE SERPL-SCNC: 124 MMOL/L — HIGH (ref 96–108)
CO2 SERPL-SCNC: 14 MMOL/L — LOW (ref 22–31)
CREAT SERPL-MCNC: 3.88 MG/DL — HIGH (ref 0.5–1.3)
EGFR: 12 ML/MIN/1.73M2 — LOW
FLUAV AG NPH QL: SIGNIFICANT CHANGE UP
FLUBV AG NPH QL: SIGNIFICANT CHANGE UP
GLUCOSE BLDC GLUCOMTR-MCNC: 115 MG/DL — HIGH (ref 70–99)
GLUCOSE BLDC GLUCOMTR-MCNC: 165 MG/DL — HIGH (ref 70–99)
GLUCOSE SERPL-MCNC: 136 MG/DL — HIGH (ref 70–99)
HCT VFR BLD CALC: 29.7 % — LOW (ref 34.5–45)
HGB BLD-MCNC: 8.8 G/DL — LOW (ref 11.5–15.5)
INR BLD: 1.05 RATIO — SIGNIFICANT CHANGE UP (ref 0.88–1.16)
LACTATE SERPL-SCNC: 1.7 MMOL/L — SIGNIFICANT CHANGE UP (ref 0.7–2)
LIDOCAIN IGE QN: 150 U/L — SIGNIFICANT CHANGE UP (ref 73–393)
MAGNESIUM SERPL-MCNC: 2 MG/DL — SIGNIFICANT CHANGE UP (ref 1.6–2.6)
MCHC RBC-ENTMCNC: 24 PG — LOW (ref 27–34)
MCHC RBC-ENTMCNC: 29.6 G/DL — LOW (ref 32–36)
MCV RBC AUTO: 81.1 FL — SIGNIFICANT CHANGE UP (ref 80–100)
NRBC # BLD: 0 /100 WBCS — SIGNIFICANT CHANGE UP (ref 0–0)
PLATELET # BLD AUTO: 505 K/UL — HIGH (ref 150–400)
POTASSIUM SERPL-MCNC: 5.8 MMOL/L — HIGH (ref 3.5–5.3)
POTASSIUM SERPL-SCNC: 5.8 MMOL/L — HIGH (ref 3.5–5.3)
PROT SERPL-MCNC: 7.2 GM/DL — SIGNIFICANT CHANGE UP (ref 6–8.3)
PROTHROM AB SERPL-ACNC: 12.5 SEC — SIGNIFICANT CHANGE UP (ref 10.5–13.4)
RBC # BLD: 3.66 M/UL — LOW (ref 3.8–5.2)
RBC # FLD: 18.6 % — HIGH (ref 10.3–14.5)
SARS-COV-2 RNA SPEC QL NAA+PROBE: SIGNIFICANT CHANGE UP
SODIUM SERPL-SCNC: 143 MMOL/L — SIGNIFICANT CHANGE UP (ref 135–145)
WBC # BLD: 9.69 K/UL — SIGNIFICANT CHANGE UP (ref 3.8–10.5)
WBC # FLD AUTO: 9.69 K/UL — SIGNIFICANT CHANGE UP (ref 3.8–10.5)

## 2023-03-08 PROCEDURE — 93010 ELECTROCARDIOGRAM REPORT: CPT

## 2023-03-08 PROCEDURE — 99223 1ST HOSP IP/OBS HIGH 75: CPT

## 2023-03-08 PROCEDURE — 74176 CT ABD & PELVIS W/O CONTRAST: CPT | Mod: 26,ME

## 2023-03-08 PROCEDURE — 99285 EMERGENCY DEPT VISIT HI MDM: CPT

## 2023-03-08 PROCEDURE — 99053 MED SERV 10PM-8AM 24 HR FAC: CPT

## 2023-03-08 PROCEDURE — G1004: CPT

## 2023-03-08 RX ORDER — SODIUM BICARBONATE 1 MEQ/ML
0.16 SYRINGE (ML) INTRAVENOUS
Qty: 150 | Refills: 0 | Status: COMPLETED | OUTPATIENT
Start: 2023-03-08 | End: 2023-03-09

## 2023-03-08 RX ORDER — HEPARIN SODIUM 5000 [USP'U]/ML
5000 INJECTION INTRAVENOUS; SUBCUTANEOUS EVERY 12 HOURS
Refills: 0 | Status: DISCONTINUED | OUTPATIENT
Start: 2023-03-08 | End: 2023-03-10

## 2023-03-08 RX ORDER — ONDANSETRON 8 MG/1
4 TABLET, FILM COATED ORAL EVERY 8 HOURS
Refills: 0 | Status: DISCONTINUED | OUTPATIENT
Start: 2023-03-08 | End: 2023-03-10

## 2023-03-08 RX ORDER — INSULIN LISPRO 100/ML
VIAL (ML) SUBCUTANEOUS
Refills: 0 | Status: DISCONTINUED | OUTPATIENT
Start: 2023-03-08 | End: 2023-03-10

## 2023-03-08 RX ORDER — DEXTROSE 50 % IN WATER 50 %
15 SYRINGE (ML) INTRAVENOUS ONCE
Refills: 0 | Status: DISCONTINUED | OUTPATIENT
Start: 2023-03-08 | End: 2023-03-10

## 2023-03-08 RX ORDER — SODIUM CHLORIDE 9 MG/ML
1000 INJECTION, SOLUTION INTRAVENOUS
Refills: 0 | Status: DISCONTINUED | OUTPATIENT
Start: 2023-03-08 | End: 2023-03-08

## 2023-03-08 RX ORDER — METOPROLOL TARTRATE 50 MG
200 TABLET ORAL DAILY
Refills: 0 | Status: DISCONTINUED | OUTPATIENT
Start: 2023-03-09 | End: 2023-03-09

## 2023-03-08 RX ORDER — IOHEXOL 300 MG/ML
30 INJECTION, SOLUTION INTRAVENOUS ONCE
Refills: 0 | Status: COMPLETED | OUTPATIENT
Start: 2023-03-08 | End: 2023-03-08

## 2023-03-08 RX ORDER — SODIUM CHLORIDE 9 MG/ML
1000 INJECTION INTRAMUSCULAR; INTRAVENOUS; SUBCUTANEOUS ONCE
Refills: 0 | Status: COMPLETED | OUTPATIENT
Start: 2023-03-08 | End: 2023-03-08

## 2023-03-08 RX ORDER — REPAGLINIDE 1 MG/1
1 TABLET ORAL
Qty: 0 | Refills: 0 | DISCHARGE

## 2023-03-08 RX ORDER — ASPIRIN/CALCIUM CARB/MAGNESIUM 324 MG
81 TABLET ORAL DAILY
Refills: 0 | Status: DISCONTINUED | OUTPATIENT
Start: 2023-03-09 | End: 2023-03-10

## 2023-03-08 RX ORDER — ISOSORBIDE MONONITRATE 60 MG/1
60 TABLET, EXTENDED RELEASE ORAL DAILY
Refills: 0 | Status: DISCONTINUED | OUTPATIENT
Start: 2023-03-09 | End: 2023-03-09

## 2023-03-08 RX ORDER — CALCIUM GLUCONATE 100 MG/ML
2 VIAL (ML) INTRAVENOUS ONCE
Refills: 0 | Status: COMPLETED | OUTPATIENT
Start: 2023-03-08 | End: 2023-03-08

## 2023-03-08 RX ORDER — SODIUM CHLORIDE 9 MG/ML
1000 INJECTION, SOLUTION INTRAVENOUS
Refills: 0 | Status: DISCONTINUED | OUTPATIENT
Start: 2023-03-08 | End: 2023-03-10

## 2023-03-08 RX ORDER — GLUCAGON INJECTION, SOLUTION 0.5 MG/.1ML
1 INJECTION, SOLUTION SUBCUTANEOUS ONCE
Refills: 0 | Status: DISCONTINUED | OUTPATIENT
Start: 2023-03-08 | End: 2023-03-10

## 2023-03-08 RX ORDER — ATORVASTATIN CALCIUM 80 MG/1
80 TABLET, FILM COATED ORAL AT BEDTIME
Refills: 0 | Status: DISCONTINUED | OUTPATIENT
Start: 2023-03-08 | End: 2023-03-10

## 2023-03-08 RX ORDER — INSULIN LISPRO 100/ML
VIAL (ML) SUBCUTANEOUS
Refills: 0 | Status: DISCONTINUED | OUTPATIENT
Start: 2023-03-08 | End: 2023-03-08

## 2023-03-08 RX ORDER — DEXTROSE 50 % IN WATER 50 %
25 SYRINGE (ML) INTRAVENOUS ONCE
Refills: 0 | Status: DISCONTINUED | OUTPATIENT
Start: 2023-03-08 | End: 2023-03-10

## 2023-03-08 RX ORDER — POLYETHYLENE GLYCOL 3350 17 G/17G
17 POWDER, FOR SOLUTION ORAL DAILY
Refills: 0 | Status: DISCONTINUED | OUTPATIENT
Start: 2023-03-08 | End: 2023-03-10

## 2023-03-08 RX ORDER — ACETAMINOPHEN 500 MG
650 TABLET ORAL EVERY 6 HOURS
Refills: 0 | Status: DISCONTINUED | OUTPATIENT
Start: 2023-03-08 | End: 2023-03-10

## 2023-03-08 RX ORDER — LANOLIN ALCOHOL/MO/W.PET/CERES
3 CREAM (GRAM) TOPICAL AT BEDTIME
Refills: 0 | Status: DISCONTINUED | OUTPATIENT
Start: 2023-03-08 | End: 2023-03-10

## 2023-03-08 RX ORDER — INSULIN LISPRO 100/ML
VIAL (ML) SUBCUTANEOUS AT BEDTIME
Refills: 0 | Status: DISCONTINUED | OUTPATIENT
Start: 2023-03-08 | End: 2023-03-08

## 2023-03-08 RX ORDER — SENNA PLUS 8.6 MG/1
2 TABLET ORAL AT BEDTIME
Refills: 0 | Status: DISCONTINUED | OUTPATIENT
Start: 2023-03-08 | End: 2023-03-10

## 2023-03-08 RX ORDER — AMLODIPINE BESYLATE 2.5 MG/1
10 TABLET ORAL DAILY
Refills: 0 | Status: DISCONTINUED | OUTPATIENT
Start: 2023-03-08 | End: 2023-03-09

## 2023-03-08 RX ORDER — SODIUM ZIRCONIUM CYCLOSILICATE 10 G/10G
10 POWDER, FOR SUSPENSION ORAL ONCE
Refills: 0 | Status: COMPLETED | OUTPATIENT
Start: 2023-03-08 | End: 2023-03-08

## 2023-03-08 RX ORDER — DEXTROSE 50 % IN WATER 50 %
12.5 SYRINGE (ML) INTRAVENOUS ONCE
Refills: 0 | Status: DISCONTINUED | OUTPATIENT
Start: 2023-03-08 | End: 2023-03-10

## 2023-03-08 RX ADMIN — ATORVASTATIN CALCIUM 80 MILLIGRAM(S): 80 TABLET, FILM COATED ORAL at 21:55

## 2023-03-08 RX ADMIN — Medication 100 MEQ/KG/HR: at 19:15

## 2023-03-08 RX ADMIN — SENNA PLUS 2 TABLET(S): 8.6 TABLET ORAL at 21:55

## 2023-03-08 RX ADMIN — SODIUM CHLORIDE 1000 MILLILITER(S): 9 INJECTION INTRAMUSCULAR; INTRAVENOUS; SUBCUTANEOUS at 11:10

## 2023-03-08 RX ADMIN — HEPARIN SODIUM 5000 UNIT(S): 5000 INJECTION INTRAVENOUS; SUBCUTANEOUS at 19:17

## 2023-03-08 RX ADMIN — Medication 200 GRAM(S): at 13:51

## 2023-03-08 RX ADMIN — Medication 0: at 15:40

## 2023-03-08 RX ADMIN — Medication 2 GRAM(S): at 14:10

## 2023-03-08 RX ADMIN — SODIUM ZIRCONIUM CYCLOSILICATE 10 GRAM(S): 10 POWDER, FOR SUSPENSION ORAL at 13:51

## 2023-03-08 RX ADMIN — SODIUM CHLORIDE 1000 MILLILITER(S): 9 INJECTION INTRAMUSCULAR; INTRAVENOUS; SUBCUTANEOUS at 10:10

## 2023-03-08 RX ADMIN — IOHEXOL 30 MILLILITER(S): 300 INJECTION, SOLUTION INTRAVENOUS at 08:35

## 2023-03-08 NOTE — CONSULT NOTE ADULT - SUBJECTIVE AND OBJECTIVE BOX
Patient chart reviewed, full consult to follow.     Thank you for the courtesy of this consultation. [facility] NEPHROLOGY [facility], [facility]  NEPHROLOGY AND HYPERTENSION  [address]  [address]  [place] [address]  [phone]    MD CIRO JONES MD YELENA ROSENBERG, MD BINNY KOSHY, MD CHRISTOPHER CAPUTO, MD EDWARD BOVER, MD      Information from chart:  "Patient is a 77y old  Female who presents with a chief complaint of YAN, ovarian cancer (08 Mar 2023 18:20)    HPI:  77 years old female with h/o HTN, HLD, CAD s/p PCI in 2018, DM, obesity present to ED with complain of abdominal distension and pain. Patient reported gradual worsening of abdominal distension and pain for 1 week. Pain is more like generalized abdominal discomfort. Reported constipation and last bowel movement was 4 days ago. Still pass gas. Decrease oral intake due to no appetite.   Hemodynamically stable, afebrile, sat well at RA. No leukocytosis Hb 8.8, MCV 81.1, plt 505, Cr 3.88, K 5.8, glucose 136, lactate 1.7. CT abd/pelvis noted 7.9x5.2x5.9cm right adnexal mass suspicious for ovarian malignancy. Moderate ascites and probable omental carcinomatosis. Epicardial adenopathy. Prominent retroperitoneal lymph nodes      No distress  Increasing abd girth last 1 week  No hx of renal disease     SH: no toxic habits  FH: no family h/o HTN, DM (08 Mar 2023 16:28)   "    PAST MEDICAL & SURGICAL HISTORY:  HTN (hypertension)      DM (diabetes mellitus)      HLD (hyperlipidemia)      Essential hypertension      Controlled type 2 diabetes mellitus without complication, without long-term current use of insulin      Hyperlipidemia associated with type 2 diabetes mellitus      H/O:       No significant past surgical history        FAMILY HISTORY:  No pertinent family history in first degree relatives      Allergies    No Known Allergies    Intolerances      Home Medications:  amLODIPine 10 mg oral tablet: 1 tab(s) orally once a day (08 Mar 2023 15:12)  aspirin 81 mg oral delayed release tablet: 1 tab(s) orally once a day (2018 14:55)  hydroCHLOROthiazide 25 mg oral tablet: 1 tab(s) orally once a day (08 Mar 2023 15:13)  Imdur 60 mg oral tablet, extended release: 1 tab(s) orally once a day (in the morning) (08 Mar 2023 15:13)  lisinopril 40 mg oral tablet: 1 tab(s) orally once a day (08 Mar 2023 15:12)  metFORMIN 1000 mg oral tablet: 1 tab(s) orally 2 times a day (2018 15:44)  metoprolol succinate 200 mg oral capsule, extended release: 1 cap(s) orally once a day (08 Mar 2023 15:24)  NovoLOG 100 units/mL injectable solution:  (08 Mar 2023 15:13)  potassium chloride 10 mEq oral capsule, extended release: 1 tab(s) orally once a day (08 Mar 2023 15:13)  Trulicity Pen 0.75 mg/0.5 mL subcutaneous solution: 0.75 milligram(s) subcutaneous every 7 days (2018 15:37)    MEDICATIONS  (STANDING):  amLODIPine   Tablet 10 milliGRAM(s) Oral daily  atorvastatin 80 milliGRAM(s) Oral at bedtime  dextrose 5%. 1000 milliLiter(s) (100 mL/Hr) IV Continuous <Continuous>  dextrose 5%. 1000 milliLiter(s) (50 mL/Hr) IV Continuous <Continuous>  dextrose 50% Injectable 25 Gram(s) IV Push once  dextrose 50% Injectable 12.5 Gram(s) IV Push once  dextrose 50% Injectable 25 Gram(s) IV Push once  glucagon  Injectable 1 milliGRAM(s) IntraMuscular once  heparin   Injectable 5000 Unit(s) SubCutaneous every 12 hours  insulin lispro (ADMELOG) corrective regimen sliding scale   SubCutaneous three times a day before meals  polyethylene glycol 3350 17 Gram(s) Oral daily  senna 2 Tablet(s) Oral at bedtime  sodium bicarbonate  Infusion 0.162 mEq/kG/Hr (100 mL/Hr) IV Continuous <Continuous>    MEDICATIONS  (PRN):  acetaminophen     Tablet .. 650 milliGRAM(s) Oral every 6 hours PRN Mild Pain (1 - 3), Moderate Pain (4 - 6)  aluminum hydroxide/magnesium hydroxide/simethicone Suspension 30 milliLiter(s) Oral every 4 hours PRN Dyspepsia  dextrose Oral Gel 15 Gram(s) Oral once PRN Blood Glucose LESS THAN 70 milliGRAM(s)/deciliter  melatonin 3 milliGRAM(s) Oral at bedtime PRN Insomnia  ondansetron Injectable 4 milliGRAM(s) IV Push every 8 hours PRN Nausea and/or Vomiting    Vital Signs Last 24 Hrs  T(C): 36.7 (08 Mar 2023 15:41), Max: 36.7 (08 Mar 2023 15:41)  T(F): 98.1 (08 Mar 2023 15:41), Max: 98.1 (08 Mar 2023 15:41)  HR: 66 (08 Mar 2023 15:41) (66 - 69)  BP: 111/50 (08 Mar 2023 15:41) (108/61 - 128/64)  BP(mean): --  RR: 25 (08 Mar 2023 15:41) (19 - 25)  SpO2: 96% (08 Mar 2023 15:41) (95% - 97%)    Parameters below as of 08 Mar 2023 15:41  Patient On (Oxygen Delivery Method): nasal cannula  O2 Flow (L/min): 2      Daily Height in cm: 167.64 (08 Mar 2023 07:37)    Daily     CAPILLARY BLOOD GLUCOSE      POCT Blood Glucose.: 115 mg/dL (08 Mar 2023 15:38)    PHYSICAL EXAM:      T(C): 36.7 (23 @ 15:41), Max: 36.7 (23 @ 15:41)  HR: 66 (23 @ 15:41) (66 - 69)  BP: 111/50 (23 @ 15:41) (108/61 - 128/64)  RR: 25 (23 @ 15:41) (19 - 25)  SpO2: 96% (23 @ 15:41) (95% - 97%)  Wt(kg): --  Lungs clear  Heart S1S2  Abd moderate distention;   Extremities:   tr edema                  143  |  124<H>  |  63<H>  ----------------------------<  136<H>  5.8<H>   |  14<L>  |  3.88<H>    Ca    9.7      08 Mar 2023 08:26  Mg     2.0         TPro  7.2  /  Alb  2.7<L>  /  TBili  0.3  /  DBili  x   /  AST  29  /  ALT  13  /  AlkPhos  125<H>  03                          8.8    9.69  )-----------( 505      ( 08 Mar 2023 08:26 )             29.7     Creatinine Trend: 3.88<--    < from: CT Abdomen and Pelvis w/ Oral Cont (23 @ 13:15) >  IMPRESSION:  7.9 x 5.2 x 5.9 cm right adnexal mass suspicious for ovarian malignancy.   Limited characterization without IV contrast.    Moderate ascites and probable omental carcinomatosis.    Epicardial adenopathy. Prominent retroperitoneal lymph nodes.    Findings were discussed with Dr. MARY STEVENS [id number] 3/8/2023   1:56 PM by Dr. Kathi Arambula with read back confirmation.    < end of copied text >          Assessment   YAN etiology unclear; possible RPLAN related vs abd htn   R/O occult TLS    Plan  Therapeutic and diagnostic paracentesis   Judicious IVF with bicarbonate support   Hold ACE; metformin  Uric acid, phos, LDH  Will follow course;     Erasmo Dinh MD        Patient chart reviewed, full consult to follow.     Thank you for the courtesy of this consultation.

## 2023-03-08 NOTE — ED PROVIDER NOTE - OBJECTIVE STATEMENT
76 yo F PMH DM, HTN, HLD, MI s/p PCI in 2018 here for 1 week of constipation-though still passing gas, and abdominal pressure from increased distension. Pt denies fevers, chills, n/v. States took dose immodium yesterday and produced more flatus but no BM. No urinary sx. Hx of c section x2. Pt states taking in less PO as taking PO worsens abdominal distension.

## 2023-03-08 NOTE — ED PROVIDER NOTE - ABDOMINAL EXAM
abdominal distension. No rebound, guarding, or CVA tenderness. No obvious hernia on exam. Midline vertical incision below umbilicus likely from prior c sections/soft/nontender...

## 2023-03-08 NOTE — H&P ADULT - PROBLEM SELECTOR PLAN 3
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

## 2023-03-08 NOTE — H&P ADULT - PROBLEM SELECTOR PLAN 1
Cr 3.88, no recent Cr in system. Last Cr 1.14 in 2018  Reported decrease oral intake  Gentle IV hydration  Ultrasound abdomen  Monitor renal function  Hold HCTZ and lisinopril  Hyperkalemia likely due to YAN and on KCL supplement. Received Lokelma, IV calcium  in ED. Stop KCL. Monitor serum electrolytes  Nephrology consulted

## 2023-03-08 NOTE — ED ADULT TRIAGE NOTE - CHIEF COMPLAINT QUOTE
BIBA as per patient c/o abdominal distention, constipation x 2 weeks, with abdominal pain starting today.

## 2023-03-08 NOTE — H&P ADULT - PROBLEM SELECTOR PROBLEM 8
Patient is a 64y old  Female who presents with a chief complaint of SOB, LE pain (21 Jun 2020 13:34)      SUBJECTIVE / OVERNIGHT EVENTS: feels better  Review of Systems  chest pain no  palpitations no  sob no  nausea no  headache no    MEDICATIONS  (STANDING):  aspirin enteric coated 81 milliGRAM(s) Oral daily  atorvastatin 20 milliGRAM(s) Oral at bedtime  buDESOnide   90 MICROgram(s) Inhaler 2 Puff(s) Inhalation two times a day  clopidogrel Tablet 75 milliGRAM(s) Oral daily  dextrose 5%. 1000 milliLiter(s) (50 mL/Hr) IV Continuous <Continuous>  dextrose 50% Injectable 12.5 Gram(s) IV Push once  dextrose 50% Injectable 25 Gram(s) IV Push once  dextrose 50% Injectable 25 Gram(s) IV Push once  heparin   Injectable 5000 Unit(s) SubCutaneous every 8 hours  insulin glargine Injectable (LANTUS) 10 Unit(s) SubCutaneous at bedtime  insulin lispro (HumaLOG) corrective regimen sliding scale   SubCutaneous three times a day before meals  insulin lispro (HumaLOG) corrective regimen sliding scale   SubCutaneous at bedtime  insulin lispro Injectable (HumaLOG) 2 Unit(s) SubCutaneous three times a day before meals  lisinopril 10 milliGRAM(s) Oral daily  metoprolol succinate ER 50 milliGRAM(s) Oral daily  sevelamer carbonate 800 milliGRAM(s) Oral three times a day with meals    MEDICATIONS  (PRN):  acetaminophen   Tablet .. 650 milliGRAM(s) Oral every 6 hours PRN Temp greater or equal to 38C (100.4F), Mild Pain (1 - 3), Moderate Pain (4 - 6)  dextrose 40% Gel 15 Gram(s) Oral once PRN Blood Glucose LESS THAN 70 milliGRAM(s)/deciliter  glucagon  Injectable 1 milliGRAM(s) IntraMuscular once PRN Glucose LESS THAN 70 milligrams/deciliter      Vital Signs Last 24 Hrs  T(C): 36.3 (21 Jun 2020 16:43), Max: 37.3 (20 Jun 2020 21:55)  T(F): 97.4 (21 Jun 2020 16:43), Max: 99.2 (20 Jun 2020 21:55)  HR: 66 (21 Jun 2020 16:43) (63 - 77)  BP: 110/56 (21 Jun 2020 16:43) (110/56 - 143/86)  BP(mean): --  RR: 18 (21 Jun 2020 16:43) (18 - 19)  SpO2: 100% (21 Jun 2020 16:43) (97% - 100%)    PHYSICAL EXAM:  GENERAL: NAD, well-developed  HEAD:  Atraumatic, Normocephalic  EYES: EOMI, PERRLA, conjunctiva and sclera clear  NECK: Supple, No JVD  CHEST/LUNG: Clear to auscultation bilaterally; No wheeze  HEART: Regular rate and rhythm; No murmurs, rubs, or gallops  ABDOMEN: Soft, Nontender, Nondistended; Bowel sounds present  EXTREMITIES:  2+ bipedal edema L>R  PSYCH: AAOx3  NEUROLOGY: non-focal  SKIN: No rashes or lesions    LABS:                        9.8    4.12  )-----------( 201      ( 21 Jun 2020 06:26 )             31.8     06-21    136  |  94<L>  |  16  ----------------------------<  162<H>  3.8   |  28  |  3.47<H>    Ca    9.0      21 Jun 2020 06:26                Culture - Blood (collected 18 Jun 2020 22:19)  Source: .Blood Blood-Venous  Preliminary Report (19 Jun 2020 23:01):    No growth to date.    Culture - Blood (collected 18 Jun 2020 22:01)  Source: .Blood Blood-Venous  Preliminary Report (19 Jun 2020 23:01):    No growth to date.        RADIOLOGY & ADDITIONAL TESTS:    Imaging Personally Reviewed:    Consultant(s) Notes Reviewed:      Care Discussed with Consultants/Other Providers: Type 2 diabetes mellitus with unspecified complications

## 2023-03-08 NOTE — H&P ADULT - HISTORY OF PRESENT ILLNESS
77 years old female with h/o HTN, HLD, CAD s/p PCI in 2018, DM, obesity present to ED with complain of abdominal distension and pain. Patient reported gradual worsening of abdominal distension and pain for 1 week. Pain is more like generalized abdominal discomfort. Reported constipation and last bowel movement was 4 days ago. Still pass gas. Decrease oral intake due to no appetite.   Hemodynamically stable, afebrile, sat well at RA. No leukocytosis Hb 8.8, MCV 81.1, plt 505, Cr 3.88, K 5.8, glucose 136, lactate 1.7. CT abd/pelvis noted 7.9x5.2x5.9cm right adnexal mass suspicious for ovarian malignancy. Moderate ascites and probable omental carcinomatosis. Epicardial adenopathy. Prominent retroperitoneal lymph nodes    SH: no toxic habits  FH: no family h/o HTN, DM

## 2023-03-08 NOTE — ED ADULT NURSE NOTE - OBJECTIVE STATEMENT
Pt aaox4, bibems from home c/o 7/10 abdominal pain since waking up today and constipation, abdominal distention x2 days. +flatus. PMHx DM II, HTN, HLD, CAD, s/p STEMI (2018), cardiac stent x1 (on plavix), chronic back pain. Pt denies any significant PSHx. Pt denies cp, sob, dizziness, n/v/d, fever/chills, cough, congestion, urinary s/s, bloody stools, numbness/tingling, weakness. +abdominal distention. VSS in triage. Pt has not had anything to eat or drink since yesterday afternoon, but states that she does feel hungry. Respirations even and unlabored.

## 2023-03-08 NOTE — H&P ADULT - ASSESSMENT
77 years old female with h/o HTN, HLD, CAD s/p PCI in 2018, DM, obesity present to ED with complain of abdominal distension and pain. Patient reported gradual worsening of abdominal distension and pain for 1 week. Pain is more like generalized abdominal discomfort. Reported constipation and last bowel movement was 4 days ago. Still pass gas. Decrease oral intake due to no appetite.   Hemodynamically stable, afebrile, sat well at RA. No leukocytosis Hb 8.8, MCV 81.1, plt 505, Cr 3.88, K 5.8, glucose 136, lactate 1.7. CT abd/pelvis noted 7.9x5.2x5.9cm right adnexal mass suspicious for ovarian malignancy. Moderate ascites and probable omental carcinomatosis. Epicardial adenopathy. Prominent retroperitoneal lymph nodes      Admitted with YAN, ovarian cancer with peritoneal carcinomatosis

## 2023-03-08 NOTE — H&P ADULT - PROBLEM SELECTOR PLAN 5
CAD s/p PCI in 2018  on aspirin, atorvastatin 80mg hs, metoprolol ER 200mg  Lisinopril and HCTZ on hold due to YAN

## 2023-03-08 NOTE — ED PROVIDER NOTE - CLINICAL SUMMARY MEDICAL DECISION MAKING FREE TEXT BOX
78 yo F PMH DM, HTN, HLD, CAD s/p PCI here with constipation and abdominal distension x1 week. pt still passing flatus. Pt has hx of c section x2. Abdomen soft nontender but is markedly distended. No obvious ascites. Concern for sbo vs ileus vs volvulus. Will f/u ct a/p. Will f/u labs for electrolyte abnormalities 76 yo F PMH DM, HTN, HLD, CAD s/p PCI here with constipation and abdominal distension x1 week. pt still passing flatus. Pt has hx of c section x2. Abdomen soft nontender but is markedly distended. No obvious ascites. Concern for sbo vs ileus vs volvulus. Will f/u ct a/p. Will f/u labs for electrolyte abnormalities    Pt with hyperkalemia. Mg pending. EKG ordered. hyperK cocktail given. Pt with new YAN. Cannot ct scan with iv contrast. Drank po contrast, will f/u ct to r/o sbo. pt will need admission 76 yo F PMH DM, HTN, HLD, CAD s/p PCI here with constipation and abdominal distension x1 week. pt still passing flatus. Pt has hx of c section x2. Abdomen soft nontender but is markedly distended. No obvious ascites. Concern for sbo vs ileus vs volvulus. Will f/u ct a/p. Will f/u labs for electrolyte abnormalities    Pt with hyperkalemia. Mg pending. EKG ordered. hyperK cocktail given. Pt with new YAN. Cannot ct scan with iv contrast. Drank po contrast, will f/u ct to r/o sbo. pt will need admission    pt with ovarian mass and omental carcinomatosis with mod ascites. Will need admission. Dr. Mathis aware.

## 2023-03-08 NOTE — H&P ADULT - PROBLEM SELECTOR PLAN 2
abdominal distension and discomfort  CT abd/pelvis noted 7.9x5.2x5.9cm right adnexal mass suspicious for ovarian malignancy. Moderate ascites and probable omental carcinomatosis. Epicardial adenopathy. Prominent retroperitoneal lymph nodes  Abd distension clinically significant- IR consulted for diagnostic and therapeutic paracentesis. ( talked to IR team, possibly on Friday)  Pelvis ultrasound  Tumor marker  Oncology consulted- called via consult service line and left consult information with staff

## 2023-03-08 NOTE — H&P ADULT - NSHPPHYSICALEXAM_GEN_ALL_CORE
CONSTITUTIONAL: alert and cooperative, no acute distress  EYES: PERRL, no scleral icterus  ENT: Mucosa moist, tongue normal.   NECK: Neck supple, trachea midline, non-tender, no masses or thyromegaly.  CARDIAC: Normal S1 and S2. No Pedal edema. Peripheral pulses intact  LUNGS: Equal air entry both lungs. No rales, rhonchi, wheezing. Normal respiratory effort.   ABDOMEN: Abdominal distension +, ascites +. No guarding or rebound tenderness. No hepatomegaly or splenomegaly. Bowel sound normal.   MUSCULOSKELETAL: Normocephalic, atraumatic. No significant deformity or joint abnormality  NEUROLOGICAL: No gross motor or sensory deficits  SKIN: no lesions or eruptions. Normal turgor  PSYCHIATRIC: A&O x 3, appropriate mood and affect

## 2023-03-08 NOTE — ED ADULT NURSE NOTE - NSIMPLEMENTINTERV_GEN_ALL_ED
Implemented All Fall with Harm Risk Interventions:  Bessie to call system. Call bell, personal items and telephone within reach. Instruct patient to call for assistance. Room bathroom lighting operational. Non-slip footwear when patient is off stretcher. Physically safe environment: no spills, clutter or unnecessary equipment. Stretcher in lowest position, wheels locked, appropriate side rails in place. Provide visual cue, wrist band, yellow gown, etc. Monitor gait and stability. Monitor for mental status changes and reorient to person, place, and time. Review medications for side effects contributing to fall risk. Reinforce activity limits and safety measures with patient and family. Provide visual clues: red socks.

## 2023-03-08 NOTE — ED ADULT NURSE REASSESSMENT NOTE - NS ED NURSE REASSESS COMMENT FT1
Report received from LATONYA Calhoun. Patient is a&ox4 with IV intact. Patient seen receiving heparin. NAD noted. Denies pain/discomfort at this time

## 2023-03-09 LAB
A1C WITH ESTIMATED AVERAGE GLUCOSE RESULT: 6.5 % — HIGH (ref 4–5.6)
AFP-TM SERPL-MCNC: <1.8 NG/ML — SIGNIFICANT CHANGE UP
ALBUMIN SERPL ELPH-MCNC: 2.4 G/DL — LOW (ref 3.3–5)
ALP SERPL-CCNC: 111 U/L — SIGNIFICANT CHANGE UP (ref 40–120)
ALT FLD-CCNC: 19 U/L — SIGNIFICANT CHANGE UP (ref 12–78)
ANION GAP SERPL CALC-SCNC: 11 MMOL/L — SIGNIFICANT CHANGE UP (ref 5–17)
ANION GAP SERPL CALC-SCNC: 5 MMOL/L — SIGNIFICANT CHANGE UP (ref 5–17)
AST SERPL-CCNC: 60 U/L — HIGH (ref 15–37)
BILIRUB SERPL-MCNC: 0.2 MG/DL — SIGNIFICANT CHANGE UP (ref 0.2–1.2)
BUN SERPL-MCNC: 61 MG/DL — HIGH (ref 7–23)
BUN SERPL-MCNC: 64 MG/DL — HIGH (ref 7–23)
CALCIUM SERPL-MCNC: 9.3 MG/DL — SIGNIFICANT CHANGE UP (ref 8.5–10.1)
CALCIUM SERPL-MCNC: 9.7 MG/DL — SIGNIFICANT CHANGE UP (ref 8.5–10.1)
CANCER AG125 SERPL-ACNC: 990 U/ML — HIGH
CHLORIDE SERPL-SCNC: 118 MMOL/L — HIGH (ref 96–108)
CHLORIDE SERPL-SCNC: 121 MMOL/L — HIGH (ref 96–108)
CO2 SERPL-SCNC: 11 MMOL/L — LOW (ref 22–31)
CO2 SERPL-SCNC: 16 MMOL/L — LOW (ref 22–31)
CREAT SERPL-MCNC: 4.4 MG/DL — HIGH (ref 0.5–1.3)
CREAT SERPL-MCNC: 4.65 MG/DL — HIGH (ref 0.5–1.3)
EGFR: 10 ML/MIN/1.73M2 — LOW
EGFR: 9 ML/MIN/1.73M2 — LOW
ESTIMATED AVERAGE GLUCOSE: 140 MG/DL — HIGH (ref 68–114)
FERRITIN SERPL-MCNC: 106 NG/ML — SIGNIFICANT CHANGE UP (ref 15–150)
FOLATE SERPL-MCNC: 6.9 NG/ML — SIGNIFICANT CHANGE UP
GLUCOSE BLDC GLUCOMTR-MCNC: 159 MG/DL — HIGH (ref 70–99)
GLUCOSE BLDC GLUCOMTR-MCNC: 173 MG/DL — HIGH (ref 70–99)
GLUCOSE BLDC GLUCOMTR-MCNC: 178 MG/DL — HIGH (ref 70–99)
GLUCOSE BLDC GLUCOMTR-MCNC: 228 MG/DL — HIGH (ref 70–99)
GLUCOSE SERPL-MCNC: 174 MG/DL — HIGH (ref 70–99)
GLUCOSE SERPL-MCNC: 179 MG/DL — HIGH (ref 70–99)
HAPTOGLOB SERPL-MCNC: 304 MG/DL — HIGH (ref 34–200)
HAV IGM SER-ACNC: SIGNIFICANT CHANGE UP
HBV CORE IGM SER-ACNC: SIGNIFICANT CHANGE UP
HBV SURFACE AG SER-ACNC: SIGNIFICANT CHANGE UP
HCT VFR BLD CALC: 27.7 % — LOW (ref 34.5–45)
HCV AB S/CO SERPL IA: 0.14 S/CO — SIGNIFICANT CHANGE UP (ref 0–0.99)
HCV AB S/CO SERPL IA: 0.15 S/CO — SIGNIFICANT CHANGE UP (ref 0–0.99)
HCV AB SERPL-IMP: SIGNIFICANT CHANGE UP
HCV AB SERPL-IMP: SIGNIFICANT CHANGE UP
HGB BLD-MCNC: 8 G/DL — LOW (ref 11.5–15.5)
HIV 1+2 AB+HIV1 P24 AG SERPL QL IA: SIGNIFICANT CHANGE UP
IRON SATN MFR SERPL: 10 UG/DL — LOW (ref 30–160)
IRON SATN MFR SERPL: 5 % — LOW (ref 14–50)
LDH SERPL L TO P-CCNC: 418 U/L — HIGH (ref 50–242)
MAGNESIUM SERPL-MCNC: 2.1 MG/DL — SIGNIFICANT CHANGE UP (ref 1.6–2.6)
MCHC RBC-ENTMCNC: 23.6 PG — LOW (ref 27–34)
MCHC RBC-ENTMCNC: 28.9 G/DL — LOW (ref 32–36)
MCV RBC AUTO: 81.7 FL — SIGNIFICANT CHANGE UP (ref 80–100)
NRBC # BLD: 0 /100 WBCS — SIGNIFICANT CHANGE UP (ref 0–0)
PHOSPHATE SERPL-MCNC: 6.1 MG/DL — HIGH (ref 2.5–4.5)
PLATELET # BLD AUTO: 459 K/UL — HIGH (ref 150–400)
POTASSIUM SERPL-MCNC: 5.4 MMOL/L — HIGH (ref 3.5–5.3)
POTASSIUM SERPL-MCNC: 6.6 MMOL/L — CRITICAL HIGH (ref 3.5–5.3)
POTASSIUM SERPL-SCNC: 5.4 MMOL/L — HIGH (ref 3.5–5.3)
POTASSIUM SERPL-SCNC: 6.6 MMOL/L — CRITICAL HIGH (ref 3.5–5.3)
PROT SERPL-MCNC: 6.4 GM/DL — SIGNIFICANT CHANGE UP (ref 6–8.3)
RBC # BLD: 3.39 M/UL — LOW (ref 3.8–5.2)
RBC # BLD: 3.62 M/UL — LOW (ref 3.8–5.2)
RBC # FLD: 18.9 % — HIGH (ref 10.3–14.5)
RETICS #: 34.8 K/UL — SIGNIFICANT CHANGE UP (ref 25–125)
RETICS/RBC NFR: 1 % — SIGNIFICANT CHANGE UP (ref 0.5–2.5)
SODIUM SERPL-SCNC: 140 MMOL/L — SIGNIFICANT CHANGE UP (ref 135–145)
SODIUM SERPL-SCNC: 142 MMOL/L — SIGNIFICANT CHANGE UP (ref 135–145)
TIBC SERPL-MCNC: 182 UG/DL — LOW (ref 220–430)
TSH SERPL-MCNC: 4.84 UU/ML — HIGH (ref 0.36–3.74)
UIBC SERPL-MCNC: 172 UG/DL — SIGNIFICANT CHANGE UP (ref 110–370)
URATE SERPL-MCNC: 11 MG/DL — HIGH (ref 2.5–7)
VIT B12 SERPL-MCNC: 368 PG/ML — SIGNIFICANT CHANGE UP (ref 232–1245)
WBC # BLD: 11.22 K/UL — HIGH (ref 3.8–10.5)
WBC # FLD AUTO: 11.22 K/UL — HIGH (ref 3.8–10.5)

## 2023-03-09 PROCEDURE — 76700 US EXAM ABDOM COMPLETE: CPT | Mod: 26

## 2023-03-09 PROCEDURE — 71250 CT THORAX DX C-: CPT | Mod: 26

## 2023-03-09 PROCEDURE — 88189 FLOWCYTOMETRY/READ 16 & >: CPT

## 2023-03-09 PROCEDURE — 93010 ELECTROCARDIOGRAM REPORT: CPT

## 2023-03-09 PROCEDURE — 76856 US EXAM PELVIC COMPLETE: CPT | Mod: 26

## 2023-03-09 PROCEDURE — 99232 SBSQ HOSP IP/OBS MODERATE 35: CPT

## 2023-03-09 RX ORDER — METOPROLOL TARTRATE 50 MG
50 TABLET ORAL DAILY
Refills: 0 | Status: DISCONTINUED | OUTPATIENT
Start: 2023-03-10 | End: 2023-03-10

## 2023-03-09 RX ORDER — INSULIN ASPART 100 [IU]/ML
0 INJECTION, SOLUTION SUBCUTANEOUS
Qty: 0 | Refills: 0 | DISCHARGE

## 2023-03-09 RX ORDER — INSULIN ASPART 100 [IU]/ML
10 INJECTION, SOLUTION SUBCUTANEOUS
Qty: 0 | Refills: 0 | DISCHARGE

## 2023-03-09 RX ORDER — SODIUM ZIRCONIUM CYCLOSILICATE 10 G/10G
10 POWDER, FOR SUSPENSION ORAL EVERY 8 HOURS
Refills: 0 | Status: DISCONTINUED | OUTPATIENT
Start: 2023-03-09 | End: 2023-03-09

## 2023-03-09 RX ORDER — NYSTATIN CREAM 100000 [USP'U]/G
1 CREAM TOPICAL
Refills: 0 | Status: DISCONTINUED | OUTPATIENT
Start: 2023-03-09 | End: 2023-03-10

## 2023-03-09 RX ORDER — SODIUM BICARBONATE 1 MEQ/ML
0.16 SYRINGE (ML) INTRAVENOUS
Qty: 150 | Refills: 0 | Status: DISCONTINUED | OUTPATIENT
Start: 2023-03-09 | End: 2023-03-10

## 2023-03-09 RX ORDER — SODIUM ZIRCONIUM CYCLOSILICATE 10 G/10G
10 POWDER, FOR SUSPENSION ORAL EVERY 8 HOURS
Refills: 0 | Status: COMPLETED | OUTPATIENT
Start: 2023-03-09 | End: 2023-03-10

## 2023-03-09 RX ORDER — FUROSEMIDE 40 MG
20 TABLET ORAL ONCE
Refills: 0 | Status: COMPLETED | OUTPATIENT
Start: 2023-03-09 | End: 2023-03-09

## 2023-03-09 RX ORDER — INSULIN HUMAN 100 [IU]/ML
10 INJECTION, SOLUTION SUBCUTANEOUS ONCE
Refills: 0 | Status: COMPLETED | OUTPATIENT
Start: 2023-03-09 | End: 2023-03-09

## 2023-03-09 RX ORDER — ASPIRIN/CALCIUM CARB/MAGNESIUM 324 MG
1 TABLET ORAL
Qty: 0 | Refills: 0 | DISCHARGE

## 2023-03-09 RX ORDER — FUROSEMIDE 40 MG
60 TABLET ORAL ONCE
Refills: 0 | Status: COMPLETED | OUTPATIENT
Start: 2023-03-09 | End: 2023-03-09

## 2023-03-09 RX ORDER — ATORVASTATIN CALCIUM 80 MG/1
1 TABLET, FILM COATED ORAL
Qty: 30 | Refills: 0

## 2023-03-09 RX ORDER — SODIUM ZIRCONIUM CYCLOSILICATE 10 G/10G
5 POWDER, FOR SUSPENSION ORAL ONCE
Refills: 0 | Status: DISCONTINUED | OUTPATIENT
Start: 2023-03-09 | End: 2023-03-09

## 2023-03-09 RX ORDER — DULAGLUTIDE 4.5 MG/.5ML
0.75 INJECTION, SOLUTION SUBCUTANEOUS
Qty: 0 | Refills: 0 | DISCHARGE

## 2023-03-09 RX ORDER — CHOLECALCIFEROL (VITAMIN D3) 125 MCG
1 CAPSULE ORAL
Qty: 0 | Refills: 0 | DISCHARGE

## 2023-03-09 RX ORDER — DEXTROSE 50 % IN WATER 50 %
50 SYRINGE (ML) INTRAVENOUS ONCE
Refills: 0 | Status: COMPLETED | OUTPATIENT
Start: 2023-03-09 | End: 2023-03-09

## 2023-03-09 RX ADMIN — Medication 100 MEQ/KG/HR: at 22:39

## 2023-03-09 RX ADMIN — SODIUM ZIRCONIUM CYCLOSILICATE 10 GRAM(S): 10 POWDER, FOR SUSPENSION ORAL at 11:41

## 2023-03-09 RX ADMIN — Medication 4: at 11:42

## 2023-03-09 RX ADMIN — Medication 2: at 17:12

## 2023-03-09 RX ADMIN — Medication 20 MILLIGRAM(S): at 10:22

## 2023-03-09 RX ADMIN — Medication 81 MILLIGRAM(S): at 11:41

## 2023-03-09 RX ADMIN — NYSTATIN CREAM 1 APPLICATION(S): 100000 CREAM TOPICAL at 18:26

## 2023-03-09 RX ADMIN — ATORVASTATIN CALCIUM 80 MILLIGRAM(S): 80 TABLET, FILM COATED ORAL at 22:39

## 2023-03-09 RX ADMIN — Medication 60 MILLIGRAM(S): at 18:26

## 2023-03-09 RX ADMIN — Medication 2: at 08:06

## 2023-03-09 RX ADMIN — Medication 100 MEQ/KG/HR: at 11:41

## 2023-03-09 RX ADMIN — POLYETHYLENE GLYCOL 3350 17 GRAM(S): 17 POWDER, FOR SOLUTION ORAL at 11:41

## 2023-03-09 RX ADMIN — INSULIN HUMAN 10 UNIT(S): 100 INJECTION, SOLUTION SUBCUTANEOUS at 10:23

## 2023-03-09 RX ADMIN — HEPARIN SODIUM 5000 UNIT(S): 5000 INJECTION INTRAVENOUS; SUBCUTANEOUS at 18:26

## 2023-03-09 RX ADMIN — HEPARIN SODIUM 5000 UNIT(S): 5000 INJECTION INTRAVENOUS; SUBCUTANEOUS at 05:54

## 2023-03-09 RX ADMIN — SENNA PLUS 2 TABLET(S): 8.6 TABLET ORAL at 22:39

## 2023-03-09 RX ADMIN — Medication 50 MILLILITER(S): at 10:23

## 2023-03-09 RX ADMIN — Medication 100 MEQ/KG/HR: at 18:26

## 2023-03-09 NOTE — PROGRESS NOTE ADULT - PROBLEM/PLAN-2
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 Plan: Discussed optic nerve edema, OD, most consistent with ION. Discussed low suspicion of Giant Cell Arteritis/Temporal Arteritis. See PCP/FNP as scheduled for consult. No other current constitutional symptoms of GCA. Discussed association of ION with HTN, diabetes, cholesterol issues, etc. and need to follow up ASAP with FMD to evaluate for other microvascular etiologies.
DISPLAY PLAN FREE TEXT

## 2023-03-09 NOTE — PATIENT PROFILE ADULT - HAVE YOU RECEIVED AT LEAST TWO PFIZER AND/OR MODERNA VACCINATIONS (IN ANY COMBINATION) AND/OR ONE JOHNSON & JOHNSON VACCINATION?
Thank you for choosing the [facility] Candler, Aaron Bates MD, and our team as your Neuro Surgery Specialists.  We actively use feedback to constantly improve and deliver the best care possible. To provide the best experience we are collecting feedback from you on how we performed.  You may receive a survey in the mail to evaluate how we did. Please take a moment and share your thoughts.      If for any reason you feel that we did not meet your expectations or you want to share a positive experience, please give us a call. Your feedback helps us know how we are doing and what we can be doing better.    If your provider has ordered imaging for you to complete please call our pre-service department at [phone] to schedule.    Office hours: 8:00 am to 4:30 pm, Monday - Friday  Phone: [phone]    
Yes

## 2023-03-09 NOTE — PROGRESS NOTE ADULT - PROBLEM SELECTOR PLAN 1
Cr 3.88, no recent Cr in system. Last Cr 1.14 in 2018  Reported decrease oral intake  Gentle IV hydration  Ultrasound abdomen- performed evidence Moderate to large volume ascites.  Monitor renal function  Hold HCTZ and lisinopril  Hyperkalemia likely due to YAN and on KCL supplement. Received Lokelma, IV calcium  in ED. Stop KCL. Monitor serum electrolytes  Nephrology consulted

## 2023-03-09 NOTE — CONSULT NOTE ADULT - SUBJECTIVE AND OBJECTIVE BOX
Hematology Consult Note    HPI:  77 years old female with h/o HTN, HLD, CAD s/p PCI in 2018, DM, obesity present to ED with complain of abdominal distension and pain. Patient reported gradual worsening of abdominal distension and pain for 1 week. Pain is more like generalized abdominal discomfort. Reported constipation and last bowel movement was 4 days ago. Still pass gas. Decrease oral intake due to no appetite.   Hemodynamically stable, afebrile, sat well at RA. No leukocytosis Hb 8.8, MCV 81.1, plt 505, Cr 3.88, K 5.8, glucose 136, lactate 1.7. CT abd/pelvis noted 7.9x5.2x5.9cm right adnexal mass suspicious for ovarian malignancy. Moderate ascites and probable omental carcinomatosis. Epicardial adenopathy. Prominent retroperitoneal lymph nodes    SH: no toxic habits  FH: no family h/o HTN, DM (08 Mar 2023 16:28)      Allergies    No Known Allergies    Intolerances        MEDICATIONS  (STANDING):  amLODIPine   Tablet 10 milliGRAM(s) Oral daily  aspirin enteric coated 81 milliGRAM(s) Oral daily  atorvastatin 80 milliGRAM(s) Oral at bedtime  dextrose 5%. 1000 milliLiter(s) (100 mL/Hr) IV Continuous <Continuous>  dextrose 5%. 1000 milliLiter(s) (50 mL/Hr) IV Continuous <Continuous>  dextrose 50% Injectable 25 Gram(s) IV Push once  dextrose 50% Injectable 12.5 Gram(s) IV Push once  dextrose 50% Injectable 25 Gram(s) IV Push once  glucagon  Injectable 1 milliGRAM(s) IntraMuscular once  heparin   Injectable 5000 Unit(s) SubCutaneous every 12 hours  insulin lispro (ADMELOG) corrective regimen sliding scale   SubCutaneous three times a day before meals  isosorbide   mononitrate ER Tablet (IMDUR) 60 milliGRAM(s) Oral daily  metoprolol succinate  milliGRAM(s) Oral daily  polyethylene glycol 3350 17 Gram(s) Oral daily  senna 2 Tablet(s) Oral at bedtime  sodium bicarbonate  Infusion 0.162 mEq/kG/Hr (100 mL/Hr) IV Continuous <Continuous>    MEDICATIONS  (PRN):  acetaminophen     Tablet .. 650 milliGRAM(s) Oral every 6 hours PRN Mild Pain (1 - 3), Moderate Pain (4 - 6)  aluminum hydroxide/magnesium hydroxide/simethicone Suspension 30 milliLiter(s) Oral every 4 hours PRN Dyspepsia  dextrose Oral Gel 15 Gram(s) Oral once PRN Blood Glucose LESS THAN 70 milliGRAM(s)/deciliter  melatonin 3 milliGRAM(s) Oral at bedtime PRN Insomnia  ondansetron Injectable 4 milliGRAM(s) IV Push every 8 hours PRN Nausea and/or Vomiting      PAST MEDICAL & SURGICAL HISTORY:  HTN (hypertension)      DM (diabetes mellitus)      HLD (hyperlipidemia)      Essential hypertension      Controlled type 2 diabetes mellitus without complication, without long-term current use of insulin      Hyperlipidemia associated with type 2 diabetes mellitus      H/O:       No significant past surgical history          FAMILY HISTORY:  No pertinent family history in first degree relatives        SOCIAL HISTORY: No EtOH, no tobacco    REVIEW OF SYSTEMS:    CONSTITUTIONAL: No weakness, fevers or chills  EYES/ENT: No visual changes;  No vertigo or throat pain   NECK: No pain or stiffness  RESPIRATORY: c/o mild SOB,  No cough, wheezing, hemoptysis;   CARDIOVASCULAR: No chest pain or palpitations  GASTROINTESTINAL: c/o and fullness  GENITOURINARY: No dysuria, frequency or hematuria  NEUROLOGICAL: No numbness or weakness  SKIN: No itching, burning, rashes, or lesions   All other review of systems is negative unless indicated above.      Weight (kg): 92.2 ( @ 21:30)    T(F): 97.4 (23 @ 05:07), Max: 98.1 (23 @ 15:41)  HR: 80 (23 @ 05:07)  BP: 119/62 (23 @ 05:07)  RR: 20 (23 @ 05:07)  SpO2: 95% (23 @ 05:07)  Wt(kg): --    GENERAL: NAD, well-developed  HEAD:  Atraumatic, Normocephalic  EYES: EOMI, PERRLA, conjunctiva and sclera clear  NECK: Supple, No JVD  CHEST/LUNG: diminished b/l bases  HEART: Regular rate and rhythm; No murmurs, rubs, or gallops  ABDOMEN: taught and distended, positive bowel sounds present  EXTREMITIES:  2+ Peripheral Pulses, No clubbing, cyanosis, or edema  NEUROLOGY: non-focal  SKIN: denuded rash noted b/l breast fold                          8.0    11.22 )-----------( 459      ( 09 Mar 2023 06:06 )             27.7           140  |  118<H>  |  61<H>  ----------------------------<  179<H>  x    |  11<L>  |  4.40<H>    Ca    9.3      09 Mar 2023 06:06  Phos  6.1       Mg     2.1         TPro  6.4  /  Alb  2.4<L>  /  TBili  0.2  /  DBili  x   /  AST  60<H>  /  ALT  19  /  AlkPhos  111        Magnesium, Serum: 2.1 mg/dL ( @ 06:06)  Phosphorus Level, Serum: 6.1 mg/dL ( @ 06:06)  Magnesium, Serum: 2.0 mg/dL ( @ 08:26)    < from: CT Abdomen and Pelvis w/ Oral Cont (23 @ 13:15) >  ACC: [id number] EXAM:  CT ABDOMEN AND PELVIS OC   ORDERED BY: MARY STEVENS     PROCEDURE DATE:  2023          INTERPRETATION:  CLINICAL INFORMATION: 77 years  Female with Abdominal   pain, acute, nonlocalized.    COMPARISON: None.    CONTRAST/COMPLICATIONS:  IV Contrast: None  Oral Contrast: Positive contrast administered.  Complications: None reported of study completion    PROCEDURE:  CT of the Abdomen and Pelvis was performed.  Sagittal and coronal reformats were performed.    LIMITATION: Evaluation of the solid organs is limited by lack of IV   contrast.    FINDINGS:  LOWER CHEST: Moderate to large right pleural effusion with underlying   passive atelectasis. Trace left pleural effusion. Enlarged 1.5 cm   epicardial lymph node (2:17).    LIVER: Within normal limits.  BILE DUCTS: Normal caliber.  GALLBLADDER: Within normal limits.  SPLEEN: Within normal limits.  PANCREAS: Within normal limits.  ADRENALS: Within normal limits.  KIDNEYS/URETERS: Within normal limits.    BLADDER: Minimally distended.  REPRODUCTIVE ORGANS: Unremarkable uterus. Ill-defined 7.9 x 5.2 x 5.9 cm   right adnexal mass suspicious for ovarian malignancy (2:93, 6:72 and   4:82).    BOWEL: No bowel obstruction. Appendix is not visualized and cannot be   assessed  PERITONEUM: Moderate ascites. Diffuse omental nodularity and soft tissue   infiltration consistent with carcinomatosis.  VESSELS: Within normal limits.  RETROPERITONEUM/LYMPH NODES: Prominent periaortic lymph nodes. Reference   lymph node 1.1 x 1.4 cm (2:54).  ABDOMINAL WALL: Within normal limits.  BONES: Thoracolumbar degenerative changes.    IMPRESSION:  7.9 x 5.2 x 5.9 cm right adnexal mass suspicious for ovarian malignancy.   Limited characterization without IV contrast.    Moderate ascites and probable omental carcinomatosis.    Epicardial adenopathy. Prominent retroperitoneal lymph nodes.    Findings were discussed with Dr. MARY STEVENS [phone] 3/8/2023   1:56 PM by Dr. Kathi Arambula with read back confirmation.    --- End of Report ---

## 2023-03-09 NOTE — PATIENT PROFILE ADULT - FUNCTIONAL SCREEN CURRENT LEVEL: SWALLOWING (IF SCORE 2 OR MORE FOR ANY ITEM, CONSULT REHAB SERVICES), MLM)
Can patient be seen this week for an incision check?   She had her surgery with [facility] - is she planning to follow up with them or us? Usually the team that does her surgery would see her for surgery related concerns but we can see her if they can't we can try to fit her in.  Thanks  Maria Elena Sterling MD     0 = swallows foods/liquids without difficulty

## 2023-03-09 NOTE — CONSULT NOTE ADULT - NS ATTEND AMEND GEN_ALL_CORE FT
CC: Malignancy work up  INTERVAL HPI: Patient seen and examined at bedside; Events noted; Patient c/o ab distention    PMH/PSH as above    FmHx/Sox Hx NC    ROS as above; pt is not able to provide detailed review of systems  General: Noncontributory;	Skin/Breast: NC;Ophthalmologic:NC; ENMT: NC; Respiratory and Thorax: NC; Cardiovascular: NC; 	  Gastrointestinal: NC; Genitourinary:NC;  Musculoskeletal:NC; Neurological: NC; Psychiatric: NC; Hematology/Lymphatics: NC; Endocrine: NC; Allergic/Immunologic: NC    MEDICATIONS  (STANDING):  aspirin enteric coated 81 milliGRAM(s) Oral daily  atorvastatin 80 milliGRAM(s) Oral at bedtime  furosemide   Injectable 60 milliGRAM(s) IV Push once  glucagon  Injectable 1 milliGRAM(s) IntraMuscular once  heparin   Injectable 5000 Unit(s) SubCutaneous every 12 hours  insulin lispro (ADMELOG) corrective regimen sliding scale   SubCutaneous three times a day before meals  polyethylene glycol 3350 17 Gram(s) Oral daily  senna 2 Tablet(s) Oral at bedtime  sodium bicarbonate  Infusion 0.163 mEq/kG/Hr (100 mL/Hr) IV Continuous <Continuous>  sodium zirconium cyclosilicate 10 Gram(s) Oral every 8 hours    MEDICATIONS  (PRN):  acetaminophen     Tablet .. 650 milliGRAM(s) Oral every 6 hours PRN Mild Pain (1 - 3), Moderate Pain (4 - 6)  aluminum hydroxide/magnesium hydroxide/simethicone Suspension 30 milliLiter(s) Oral every 4 hours PRN Dyspepsia  dextrose Oral Gel 15 Gram(s) Oral once PRN Blood Glucose LESS THAN 70 milliGRAM(s)/deciliter  melatonin 3 milliGRAM(s) Oral at bedtime PRN Insomnia  ondansetron Injectable 4 milliGRAM(s) IV Push every 8 hours PRN Nausea and/or Vomiting      Vital Signs Last 24 Hrs  T(C): 36.4 (09 Mar 2023 11:59), Max: 36.4 (08 Mar 2023 21:30)  T(F): 97.5 (09 Mar 2023 11:59), Max: 97.6 (08 Mar 2023 21:30)  HR: 77 (09 Mar 2023 11:59) (73 - 80)  BP: 115/61 (09 Mar 2023 11:59) (115/61 - 122/64)  BP(mean): --  RR: 18 (09 Mar 2023 11:59) (18 - 22)  SpO2: 94% (09 Mar 2023 11:59) (93% - 95%)    Parameters below as of 09 Mar 2023 05:07  Patient On (Oxygen Delivery Method): nasal cannula  O2 Flow (L/min): 2    _________________  PHYSICAL EXAM:  ---------------------------  GEN: NAD; NC/AT; A and O x 3  HEENT: MMM; PERRLA  LUNGS: no wheezing; decreased bilateral air entry; no use of accessory muscles for breathing  HEART: Nl S1 S2; no M   ABDOMEN: Soft, Nontender, distended  EXTREMITIES: no cyanosis; no edema; warm and dry  NERVOUS SYSTEM:  Awake and alert; no focal neuro  deficits    _________________________________________________  LABS:                        8.0    11.22 )-----------( 459      ( 09 Mar 2023 06:06 )             27.7     03-09    142  |  121<H>  |  64<H>  ----------------------------<  174<H>  5.4<H>   |  16<L>  |  4.65<H>    Ca    9.7      09 Mar 2023 14:10  Phos  6.1     03-09  Mg     2.1     03-09    TPro  6.4  /  Alb  2.4<L>  /  TBili  0.2  /  DBili  x   /  AST  60<H>  /  ALT  19  /  AlkPhos  111  03-09    PT/INR - ( 08 Mar 2023 08:26 )   PT: 12.5 sec;   INR: 1.05 ratio       PTT - ( 08 Mar 2023 08:26 )  PTT:27.8 sec  CT reviewed  A/P  Problem #1 Malignancy work up - suspect malignancy w/ malignancy related ascites  -obtian , CEA; LDH noted  -need cytology from therapeutic and diagnostic paracentesis  -obtain CT chest  Problem #2 Anemia - suspect multifactorial etiology; obtian iron study, retic, B12.folate; SPEP, serum epo and G6PD  Problem #3 Renal failure - elevated uric acid; worsening since admission; renal input noted; unclear baseline    I have examined the patient at bedside and reviewed patient's data and participated in the management of the patient along with Berta Parks as well as hemotology/med oncology faculty consisting of Dr. ROSANA Jones, Dr. ROSANA Collins, Dr. Nakita Beach, Dr. Ava Castanon, Dr. Omero Huntley as well as myself during the daily heme/onc case review. I reviewed pertinent clinical information, PE,  labs as well as A/P as outline above.     Call with questions [phone] Herbert Moody MD

## 2023-03-10 ENCOUNTER — TRANSCRIPTION ENCOUNTER (OUTPATIENT)
Age: 78
End: 2023-03-10

## 2023-03-10 ENCOUNTER — RESULT REVIEW (OUTPATIENT)
Age: 78
End: 2023-03-10

## 2023-03-10 VITALS
HEART RATE: 93 BPM | OXYGEN SATURATION: 83 % | SYSTOLIC BLOOD PRESSURE: 150 MMHG | TEMPERATURE: 99 F | DIASTOLIC BLOOD PRESSURE: 64 MMHG | RESPIRATION RATE: 19 BRPM

## 2023-03-10 LAB
ALBUMIN FLD-MCNC: 2.2 G/DL — SIGNIFICANT CHANGE UP
ALBUMIN SERPL ELPH-MCNC: 2.2 G/DL — LOW (ref 3.3–5)
ALP SERPL-CCNC: 111 U/L — SIGNIFICANT CHANGE UP (ref 40–120)
ALT FLD-CCNC: 21 U/L — SIGNIFICANT CHANGE UP (ref 12–78)
ANION GAP SERPL CALC-SCNC: 11 MMOL/L — SIGNIFICANT CHANGE UP (ref 5–17)
AST SERPL-CCNC: 50 U/L — HIGH (ref 15–37)
B PERT IGG+IGM PNL SER: CLEAR — SIGNIFICANT CHANGE UP
BASOPHILS # BLD AUTO: 0.02 K/UL — SIGNIFICANT CHANGE UP (ref 0–0.2)
BASOPHILS NFR BLD AUTO: 0.2 % — SIGNIFICANT CHANGE UP (ref 0–2)
BILIRUB SERPL-MCNC: 0.3 MG/DL — SIGNIFICANT CHANGE UP (ref 0.2–1.2)
BUN SERPL-MCNC: 65 MG/DL — HIGH (ref 7–23)
CALCIUM SERPL-MCNC: 8.6 MG/DL — SIGNIFICANT CHANGE UP (ref 8.5–10.1)
CHLORIDE SERPL-SCNC: 115 MMOL/L — HIGH (ref 96–108)
CO2 SERPL-SCNC: 16 MMOL/L — LOW (ref 22–31)
COLOR FLD: YELLOW — SIGNIFICANT CHANGE UP
CREAT SERPL-MCNC: 4.55 MG/DL — HIGH (ref 0.5–1.3)
EGFR: 10 ML/MIN/1.73M2 — LOW
EOSINOPHIL # BLD AUTO: 0.07 K/UL — SIGNIFICANT CHANGE UP (ref 0–0.5)
EOSINOPHIL NFR BLD AUTO: 0.8 % — SIGNIFICANT CHANGE UP (ref 0–6)
FLUID INTAKE SUBSTANCE CLASS: SIGNIFICANT CHANGE UP
GLUCOSE BLDC GLUCOMTR-MCNC: 193 MG/DL — HIGH (ref 70–99)
GLUCOSE BLDC GLUCOMTR-MCNC: 213 MG/DL — HIGH (ref 70–99)
GLUCOSE BLDC GLUCOMTR-MCNC: 248 MG/DL — HIGH (ref 70–99)
GLUCOSE FLD-MCNC: 220 MG/DL — SIGNIFICANT CHANGE UP
GLUCOSE SERPL-MCNC: 241 MG/DL — HIGH (ref 70–99)
GRAM STN FLD: SIGNIFICANT CHANGE UP
HCT VFR BLD CALC: 23.3 % — LOW (ref 34.5–45)
HGB BLD-MCNC: 7.3 G/DL — LOW (ref 11.5–15.5)
IMM GRANULOCYTES NFR BLD AUTO: 0.5 % — SIGNIFICANT CHANGE UP (ref 0–0.9)
LDH SERPL L TO P-CCNC: 376 U/L — HIGH (ref 50–242)
LYMPHOCYTES # BLD AUTO: 0.75 K/UL — LOW (ref 1–3.3)
LYMPHOCYTES # BLD AUTO: 8.2 % — LOW (ref 13–44)
LYMPHOCYTES # FLD: 44 % — SIGNIFICANT CHANGE UP
MCHC RBC-ENTMCNC: 24.2 PG — LOW (ref 27–34)
MCHC RBC-ENTMCNC: 31.3 G/DL — LOW (ref 32–36)
MCV RBC AUTO: 77.2 FL — LOW (ref 80–100)
MESOTHL CELL # FLD: 1 % — SIGNIFICANT CHANGE UP
MONOCYTES # BLD AUTO: 0.82 K/UL — SIGNIFICANT CHANGE UP (ref 0–0.9)
MONOCYTES NFR BLD AUTO: 8.9 % — SIGNIFICANT CHANGE UP (ref 2–14)
MONOS+MACROS # FLD: 26 % — SIGNIFICANT CHANGE UP
NEUTROPHILS # BLD AUTO: 7.49 K/UL — HIGH (ref 1.8–7.4)
NEUTROPHILS NFR BLD AUTO: 81.4 % — HIGH (ref 43–77)
NEUTROPHILS-BODY FLUID: 26 % — SIGNIFICANT CHANGE UP
NRBC # BLD: 0 /100 WBCS — SIGNIFICANT CHANGE UP (ref 0–0)
OTHER CELLS FLD MANUAL: 3 % — SIGNIFICANT CHANGE UP
PLATELET # BLD AUTO: 382 K/UL — SIGNIFICANT CHANGE UP (ref 150–400)
POTASSIUM SERPL-MCNC: 5.2 MMOL/L — SIGNIFICANT CHANGE UP (ref 3.5–5.3)
POTASSIUM SERPL-SCNC: 5.2 MMOL/L — SIGNIFICANT CHANGE UP (ref 3.5–5.3)
PROT FLD-MCNC: 4.3 G/DL — SIGNIFICANT CHANGE UP
PROT SERPL-MCNC: 5.9 GM/DL — LOW (ref 6–8.3)
PROT SERPL-MCNC: 6.1 G/DL — SIGNIFICANT CHANGE UP (ref 6–8.3)
PROT SERPL-MCNC: 6.1 G/DL — SIGNIFICANT CHANGE UP (ref 6–8.3)
RBC # BLD: 3.02 M/UL — LOW (ref 3.8–5.2)
RBC # FLD: 17.9 % — HIGH (ref 10.3–14.5)
RCV VOL RI: 1000 /UL — HIGH (ref 0–0)
SODIUM SERPL-SCNC: 142 MMOL/L — SIGNIFICANT CHANGE UP (ref 135–145)
SPECIMEN SOURCE FLD: SIGNIFICANT CHANGE UP
SPECIMEN SOURCE: SIGNIFICANT CHANGE UP
TM INTERPRETATION: SIGNIFICANT CHANGE UP
TOTAL NUCLEATED CELL COUNT, BODY FLUID: 1110 /UL — SIGNIFICANT CHANGE UP
TUBE TYPE: SIGNIFICANT CHANGE UP
URATE SERPL-MCNC: 11 MG/DL — HIGH (ref 2.5–7)
WBC # BLD: 9.2 K/UL — SIGNIFICANT CHANGE UP (ref 3.8–10.5)
WBC # FLD AUTO: 9.2 K/UL — SIGNIFICANT CHANGE UP (ref 3.8–10.5)

## 2023-03-10 PROCEDURE — 88305 TISSUE EXAM BY PATHOLOGIST: CPT | Mod: 26

## 2023-03-10 PROCEDURE — 88342 IMHCHEM/IMCYTCHM 1ST ANTB: CPT | Mod: 26

## 2023-03-10 PROCEDURE — 99232 SBSQ HOSP IP/OBS MODERATE 35: CPT

## 2023-03-10 PROCEDURE — 88341 IMHCHEM/IMCYTCHM EA ADD ANTB: CPT | Mod: 26

## 2023-03-10 PROCEDURE — 49083 ABD PARACENTESIS W/IMAGING: CPT

## 2023-03-10 PROCEDURE — 88112 CYTOPATH CELL ENHANCE TECH: CPT | Mod: 26

## 2023-03-10 RX ORDER — SENNA PLUS 8.6 MG/1
2 TABLET ORAL
Qty: 0 | Refills: 0 | DISCHARGE
Start: 2023-03-10

## 2023-03-10 RX ORDER — LISINOPRIL 2.5 MG/1
1 TABLET ORAL
Qty: 0 | Refills: 0 | DISCHARGE

## 2023-03-10 RX ORDER — POLYETHYLENE GLYCOL 3350 17 G/17G
17 POWDER, FOR SOLUTION ORAL
Qty: 0 | Refills: 0 | DISCHARGE
Start: 2023-03-10

## 2023-03-10 RX ORDER — HYDROCHLOROTHIAZIDE 25 MG
1 TABLET ORAL
Qty: 0 | Refills: 0 | DISCHARGE

## 2023-03-10 RX ORDER — METFORMIN HYDROCHLORIDE 850 MG/1
1 TABLET ORAL
Qty: 0 | Refills: 0 | DISCHARGE

## 2023-03-10 RX ORDER — POTASSIUM CHLORIDE 20 MEQ
1 PACKET (EA) ORAL
Qty: 0 | Refills: 0 | DISCHARGE

## 2023-03-10 RX ADMIN — POLYETHYLENE GLYCOL 3350 17 GRAM(S): 17 POWDER, FOR SOLUTION ORAL at 11:46

## 2023-03-10 RX ADMIN — Medication 4: at 17:19

## 2023-03-10 RX ADMIN — Medication 81 MILLIGRAM(S): at 11:46

## 2023-03-10 RX ADMIN — Medication 4: at 08:45

## 2023-03-10 RX ADMIN — Medication 2: at 12:26

## 2023-03-10 RX ADMIN — HEPARIN SODIUM 5000 UNIT(S): 5000 INJECTION INTRAVENOUS; SUBCUTANEOUS at 06:13

## 2023-03-10 RX ADMIN — NYSTATIN CREAM 1 APPLICATION(S): 100000 CREAM TOPICAL at 06:13

## 2023-03-10 RX ADMIN — SODIUM ZIRCONIUM CYCLOSILICATE 10 GRAM(S): 10 POWDER, FOR SUSPENSION ORAL at 06:13

## 2023-03-10 RX ADMIN — Medication 50 MILLIGRAM(S): at 06:14

## 2023-03-10 NOTE — DISCHARGE NOTE PROVIDER - NSDCMRMEDTOKEN_GEN_ALL_CORE_FT
amLODIPine 10 mg oral tablet: 1 tab(s) orally once a day  aspirin 81 mg oral delayed release tablet: 1 tab(s) orally once a day  atorvastatin 80 mg oral tablet: 1 tab(s) orally once a day (at bedtime)  hydroCHLOROthiazide 25 mg oral tablet: 1 tab(s) orally once a day  Imdur 60 mg oral tablet, extended release: 1 tab(s) orally once a day (in the morning)  metoprolol succinate 200 mg oral capsule, extended release: 1 cap(s) orally once a day  NovoLOG 100 units/mL injectable solution: 10 unit(s) injectable 3 times a day  polyethylene glycol 3350 oral powder for reconstitution: 17 gram(s) orally once a day  potassium chloride 10 mEq oral capsule, extended release: 1 tab(s) orally once a day  senna leaf extract oral tablet: 2 tab(s) orally once a day (at bedtime)  Vitamin D3 1250 mcg (50,000 intl units) oral capsule: 1 cap(s) orally once a week   amLODIPine 10 mg oral tablet: 1 tab(s) orally once a day  aspirin 81 mg oral delayed release tablet: 1 tab(s) orally once a day  atorvastatin 80 mg oral tablet: 1 tab(s) orally once a day (at bedtime)  Imdur 60 mg oral tablet, extended release: 1 tab(s) orally once a day (in the morning)  metoprolol succinate 200 mg oral capsule, extended release: 1 cap(s) orally once a day  NovoLOG 100 units/mL injectable solution: 10 unit(s) injectable 3 times a day  polyethylene glycol 3350 oral powder for reconstitution: 17 gram(s) orally once a day  senna leaf extract oral tablet: 2 tab(s) orally once a day (at bedtime)  Vitamin D3 1250 mcg (50,000 intl units) oral capsule: 1 cap(s) orally once a week

## 2023-03-10 NOTE — PROGRESS NOTE ADULT - NS ATTEND AMEND GEN_ALL_CORE FT
I have examined the patient at bedside and reviewed patient's data and participated in the management of the patient along with Fadumo BEAL as well as hemotology/med oncology faculty consisting of Dr. Robin De Leon, Dr. BENJIE Moody, Dr. ROSANA Collins, Dr. Nakita Beach, Dr. Ava Castanon, Dr. Omero Huntley as well as myself during the daily heme/onc case review. I reviewed pertinent clinical information, PE,  labs as well as A/P as outline above.     Pt with newly found R adnexal mass, peritoneal carcinomatosis and CA -125 990. s/p paracentesis w/ cytology sent. On discharge will see Dr. Hodges (GYN/ONC) and Med Onc for further management.

## 2023-03-10 NOTE — DISCHARGE NOTE PROVIDER - NSDCCPTREATMENT_GEN_ALL_CORE_FT
PRINCIPAL PROCEDURE  Procedure: US guided paracentesis  Findings and Treatment: Performed to help assist w/ the removal of fluid.

## 2023-03-10 NOTE — DISCHARGE NOTE PROVIDER - NSDCCPCAREPLAN_GEN_ALL_CORE_FT
PRINCIPAL DISCHARGE DIAGNOSIS  Diagnosis: Ovarian cancer  Assessment and Plan of Treatment: Please follow up with hematologuy/oncologist after being dishcarged. You have an appointment this up coming week.

## 2023-03-10 NOTE — DISCHARGE NOTE NURSING/CASE MANAGEMENT/SOCIAL WORK - PATIENT PORTAL LINK FT
You can access the FollowMyHealth Patient Portal offered by [facility] by registering at the following website: [email or web address]. By joining [facility]’s FollowMyHealth portal, you will also be able to view your health information using other applications (apps) compatible with our system.

## 2023-03-10 NOTE — DISCHARGE NOTE NURSING/CASE MANAGEMENT/SOCIAL WORK - NSDCPEFALRISK_GEN_ALL_CORE
For information on Fall & Injury Prevention, visit: [email or web address] OR  [email or web address] OR  https://www.cdc.gov/steadi/patient.html

## 2023-03-10 NOTE — DISCHARGE NOTE PROVIDER - CARE PROVIDER_API CALL
., .  Please follow up with Primary care doctor within 1-2 days after being discharged from the hospital  Phone: (   )    -  Fax: (   )    -  Follow Up Time: 1-3 days    ., .  -patient will need to f/u with heme/onc OP Dr. Jones MC/Nirav [phone]  -will need GYN/ ONC F/u Dr. Sujata Gastelum  [phone]  Phone: (   )    -  Fax: (   )    -  Follow Up Time: 1-3 days

## 2023-03-10 NOTE — PROGRESS NOTE ADULT - SUBJECTIVE AND OBJECTIVE BOX
NEPHROLOGY PROGRESS NOTE    CHIEF COMPLAINT:  YAN    HPI:  Denies prior hx of CKD.  Renal function worse with more acidosis and hyperkalemia.      EXAM:  T(F): 97.5 (03-09-23 @ 11:59)  HR: 77 (03-09-23 @ 11:59)  BP: 115/61 (03-09-23 @ 11:59)  RR: 18 (03-09-23 @ 11:59)  SpO2: 94% (03-09-23 @ 11:59)    Conversant, in no apparent distress  Normal respiratory effort, lungs clear bilaterally  Heart RRR with no murmur, moderate peripheral edema         LABS                             8.0    11.22 )-----------( 459      ( 09 Mar 2023 06:06 )             27.7          03-09    140  |  118<H>  |  61<H>  ----------------------------<  179<H>  6.6<HH>   |  11<L>  |  4.40<H>    Ca    9.3      09 Mar 2023 06:06  Phos  6.1     03-09  Mg     2.1     03-09    TPro  6.4  /  Alb  2.4<L>  /  TBili  0.2  /  DBili  x   /  AST  60<H>  /  ALT  19  /  AlkPhos  111  03-09    UA 11.0        Assessment   YAN etiology unclear;  spontaneous TLS would be unusual for a solid tumor; could relate to high intra abdominal pressure  Ovarian cancer suspected  Hyperkalemia/acidosis due to YAN    Plan  Therapeutic and diagnostic paracentesis   Continue HCO3 drip, lasix 60 mg IVP x 1, Lokelma 10 grams x 3 doses  Patient was counseled that she may need dialysis if electrolyte abnormalities cannot be corrected medically  Recommend telemetry monitoring        
[facility] NEPHROLOGY SERVICES, [facility]  NEPHROLOGY AND HYPERTENSION  [address]  [address]  [place] [address]  [phone]    MD CIRO JONES MD ANDREY GONCHARUK, MD MADHU KORRAPATI, MD YELENA ROSENBERG, MD BINNY KOSHY, MD CHRISTOPHER CAPUTO, MD EDWARD BOVER, MD          Patient events noted    MEDICATIONS  (STANDING):  aspirin enteric coated 81 milliGRAM(s) Oral daily  atorvastatin 80 milliGRAM(s) Oral at bedtime  dextrose 5%. 1000 milliLiter(s) (100 mL/Hr) IV Continuous <Continuous>  dextrose 5%. 1000 milliLiter(s) (50 mL/Hr) IV Continuous <Continuous>  dextrose 50% Injectable 25 Gram(s) IV Push once  dextrose 50% Injectable 12.5 Gram(s) IV Push once  dextrose 50% Injectable 25 Gram(s) IV Push once  glucagon  Injectable 1 milliGRAM(s) IntraMuscular once  heparin   Injectable 5000 Unit(s) SubCutaneous every 12 hours  insulin lispro (ADMELOG) corrective regimen sliding scale   SubCutaneous three times a day before meals  metoprolol succinate ER 50 milliGRAM(s) Oral daily  nystatin Powder 1 Application(s) Topical two times a day  polyethylene glycol 3350 17 Gram(s) Oral daily  senna 2 Tablet(s) Oral at bedtime  sodium bicarbonate  Infusion 0.163 mEq/kG/Hr (100 mL/Hr) IV Continuous <Continuous>    MEDICATIONS  (PRN):  acetaminophen     Tablet .. 650 milliGRAM(s) Oral every 6 hours PRN Mild Pain (1 - 3), Moderate Pain (4 - 6)  aluminum hydroxide/magnesium hydroxide/simethicone Suspension 30 milliLiter(s) Oral every 4 hours PRN Dyspepsia  dextrose Oral Gel 15 Gram(s) Oral once PRN Blood Glucose LESS THAN 70 milliGRAM(s)/deciliter  melatonin 3 milliGRAM(s) Oral at bedtime PRN Insomnia  ondansetron Injectable 4 milliGRAM(s) IV Push every 8 hours PRN Nausea and/or Vomiting      03-09-23 @ 07:01  -  03-10-23 @ 07:00  --------------------------------------------------------  IN: 0 mL / OUT: 600 mL / NET: -600 mL    03-10-23 @ 07:01  -  03-10-23 @ 20:48  --------------------------------------------------------  IN: 240 mL / OUT: 0 mL / NET: 240 mL      PHYSICAL EXAM:      T(C): 37.1 (03-10-23 @ 16:05), Max: 37.4 (03-09-23 @ 23:44)  HR: 93 (03-10-23 @ 16:05) (80 - 106)  BP: 150/64 (03-10-23 @ 16:05) (111/64 - 150/64)  RR: 19 (03-10-23 @ 16:05) (18 - 19)  SpO2: 83% (03-10-23 @ 16:05) (83% - 95%)  Wt(kg): --  Lungs clear  Heart S1S2  Abd soft NT ND  Extremities:   tr edema                                    7.3    9.20  )-----------( 382      ( 10 Mar 2023 08:11 )             23.3     03-10    142  |  115<H>  |  65<H>  ----------------------------<  241<H>  5.2   |  16<L>  |  4.55<H>    Ca    8.6      10 Mar 2023 08:11  Phos  6.1     03-09  Mg     2.1     03-09    TPro  5.9<L>  /  Alb  2.2<L>  /  TBili  0.3  /  DBili  x   /  AST  50<H>  /  ALT  21  /  AlkPhos  111  03-10      LIVER FUNCTIONS - ( 10 Mar 2023 08:11 )  Alb: 2.2 g/dL / Pro: 5.9 gm/dL / ALK PHOS: 111 U/L / ALT: 21 U/L / AST: 50 U/L / GGT: x           Creatinine Trend: 4.55<--, 4.65<--, 4.40<--, 3.88<--      Assessment   YAN etiology unclear;  spontaneous TLS would be unusual for a solid tumor; could relate to high intra abdominal pressure  Ovarian cancer suspected  Hyperkalemia/acidosis due to YAN  Post paracentesis     Plan  Continue bicarbonate IVF  Will need lose follow up       Erasmo Dinh MD
CC: Patient is a 77y old  Female who presents with a chief complaint of YAN, ovarian cancer (09 Mar 2023 14:44)      Patient seen and examined at bedside, No acute overnight events.  Patient ambulating, eating well, voiding and last Bowel movement was   Cardiac monitor reviewed;    ROS: Denies fever, nausea, vomiting, chest pain, SOB, abdominal pain, diarrhea and constipation    Vital Sign  Vital Signs Last 24 Hrs  T(C): 36.4 (09 Mar 2023 11:59), Max: 36.4 (08 Mar 2023 21:30)  T(F): 97.5 (09 Mar 2023 11:59), Max: 97.6 (08 Mar 2023 21:30)  HR: 77 (09 Mar 2023 11:59) (73 - 80)  BP: 115/61 (09 Mar 2023 11:59) (115/61 - 122/64)  BP(mean): --  RR: 18 (09 Mar 2023 11:59) (18 - 22)  SpO2: 94% (09 Mar 2023 11:59) (93% - 95%)    Parameters below as of 09 Mar 2023 05:07  Patient On (Oxygen Delivery Method): nasal cannula  O2 Flow (L/min): 2      Physical Exam:   Gen: NAD  HEENT: NCAT, EOMI, PERRLA, Pupils_  CVS: RRR, +S1/S2, no murmurs, rubs or gallops appreciated  Lungs: CTAB, no wheeze, rales, rhonchi  Abdomen: +BS, soft, ND, NT. no palpable flank tenderness or mass, no CVA tenderness  Ext: No cyanosis, edema or calf tenderness  Neuro: AAOx3, no focal deficits.    Labs:                        8.0    11.22 )-----------( 459      ( 09 Mar 2023 06:06 )             27.7   03-09    142  |  121<H>  |  64<H>  ----------------------------<  174<H>  5.4<H>   |  16<L>  |  4.65<H>    Ca    9.7      09 Mar 2023 14:10  Phos  6.1     03-09  Mg     2.1     03-09    TPro  6.4  /  Alb  2.4<L>  /  TBili  0.2  /  DBili  x   /  AST  60<H>  /  ALT  19  /  AlkPhos  111  03-09        Radiology:  *Pull    Medications:  MEDICATIONS  (STANDING):  aspirin enteric coated 81 milliGRAM(s) Oral daily  atorvastatin 80 milliGRAM(s) Oral at bedtime  dextrose 5%. 1000 milliLiter(s) (100 mL/Hr) IV Continuous <Continuous>  dextrose 5%. 1000 milliLiter(s) (50 mL/Hr) IV Continuous <Continuous>  dextrose 50% Injectable 25 Gram(s) IV Push once  dextrose 50% Injectable 12.5 Gram(s) IV Push once  dextrose 50% Injectable 25 Gram(s) IV Push once  furosemide   Injectable 60 milliGRAM(s) IV Push once  glucagon  Injectable 1 milliGRAM(s) IntraMuscular once  heparin   Injectable 5000 Unit(s) SubCutaneous every 12 hours  insulin lispro (ADMELOG) corrective regimen sliding scale   SubCutaneous three times a day before meals  nystatin Powder 1 Application(s) Topical two times a day  polyethylene glycol 3350 17 Gram(s) Oral daily  senna 2 Tablet(s) Oral at bedtime  sodium bicarbonate  Infusion 0.163 mEq/kG/Hr (100 mL/Hr) IV Continuous <Continuous>  sodium zirconium cyclosilicate 10 Gram(s) Oral every 8 hours    MEDICATIONS  (PRN):  acetaminophen     Tablet .. 650 milliGRAM(s) Oral every 6 hours PRN Mild Pain (1 - 3), Moderate Pain (4 - 6)  aluminum hydroxide/magnesium hydroxide/simethicone Suspension 30 milliLiter(s) Oral every 4 hours PRN Dyspepsia  dextrose Oral Gel 15 Gram(s) Oral once PRN Blood Glucose LESS THAN 70 milliGRAM(s)/deciliter  melatonin 3 milliGRAM(s) Oral at bedtime PRN Insomnia  ondansetron Injectable 4 milliGRAM(s) IV Push every 8 hours PRN Nausea and/or Vomiting  
Heme/Onc Progress note    INTERVAL HPI/OVERNIGHT EVENTS:  Patient S&E at bedside. No o/n events,  still feeling sob with increasing abd distention.  Patient denies fever, chills, dizziness, weakness, CP, palpitations, N/V/D/C, dysuria, changes in bowel movements, LE edema.    VITAL SIGNS:  T(F): 99 (03-10-23 @ 11:08)  HR: 80 (03-10-23 @ 11:08)  BP: 119/48 (03-10-23 @ 11:08)  RR: 18 (03-10-23 @ 11:08)  SpO2: 95% (03-10-23 @ 11:08)  Wt(kg): --    PHYSICAL EXAM:    Constitutional: NAD  Eyes: EOMI, sclera non-icteric  Neck: supple, no masses, no JVD  Respiratory: tachypneic and diminished b/l   Cardiovascular: RRR, no M/R/G  Gastrointestinal: distended and taught firm to palpationsoft,  Extremities: no c/c/e  Neurological: AAOx3      MEDICATIONS  (STANDING):  aspirin enteric coated 81 milliGRAM(s) Oral daily  atorvastatin 80 milliGRAM(s) Oral at bedtime  dextrose 5%. 1000 milliLiter(s) (100 mL/Hr) IV Continuous <Continuous>  dextrose 5%. 1000 milliLiter(s) (50 mL/Hr) IV Continuous <Continuous>  dextrose 50% Injectable 25 Gram(s) IV Push once  dextrose 50% Injectable 12.5 Gram(s) IV Push once  dextrose 50% Injectable 25 Gram(s) IV Push once  glucagon  Injectable 1 milliGRAM(s) IntraMuscular once  heparin   Injectable 5000 Unit(s) SubCutaneous every 12 hours  insulin lispro (ADMELOG) corrective regimen sliding scale   SubCutaneous three times a day before meals  metoprolol succinate ER 50 milliGRAM(s) Oral daily  nystatin Powder 1 Application(s) Topical two times a day  polyethylene glycol 3350 17 Gram(s) Oral daily  senna 2 Tablet(s) Oral at bedtime  sodium bicarbonate  Infusion 0.163 mEq/kG/Hr (100 mL/Hr) IV Continuous <Continuous>    MEDICATIONS  (PRN):  acetaminophen     Tablet .. 650 milliGRAM(s) Oral every 6 hours PRN Mild Pain (1 - 3), Moderate Pain (4 - 6)  aluminum hydroxide/magnesium hydroxide/simethicone Suspension 30 milliLiter(s) Oral every 4 hours PRN Dyspepsia  dextrose Oral Gel 15 Gram(s) Oral once PRN Blood Glucose LESS THAN 70 milliGRAM(s)/deciliter  melatonin 3 milliGRAM(s) Oral at bedtime PRN Insomnia  ondansetron Injectable 4 milliGRAM(s) IV Push every 8 hours PRN Nausea and/or Vomiting      Allergies    No Known Allergies    Intolerances        LABS:                        7.3    9.20  )-----------( 382      ( 10 Mar 2023 08:11 )             23.3     03-10    142  |  115<H>  |  65<H>  ----------------------------<  241<H>  5.2   |  16<L>  |  4.55<H>    Ca    8.6      10 Mar 2023 08:11  Phos  6.1     03-09  Mg     2.1     03-09    TPro  5.9<L>  /  Alb  2.2<L>  /  TBili  0.3  /  DBili  x   /  AST  50<H>  /  ALT  21  /  AlkPhos  111  03-10          RADIOLOGY & ADDITIONAL TESTS:  Studies reviewed.    < from: US Paracentesis (03.10.23 @ 11:13) >  ACC: [id number] EXAM:  US PARACENTESIS [facility]   ORDERED BY: ARELIS REDMOND     PROCEDURE DATE:  03/10/2023          INTERPRETATION:  Procedure summary:  -Ultrasound-guided diagnostic and therapeutic  paracentesis    Clinical History: Abdominal ascites with concern for malignancy. Now   planned for diagnostic and therapeutic paracentesis.    : Chandler Quinones M.D.    Assistants: None    Contrast Administered: 0  ccs    Complications: None    Procedure: Following informed consent the patient was placed in the   supine position and the right lower quadrant  prepped and draped in a   sterile fashion. Physiologic monitoring was performed throughout the   procedure. Under ultrasound guidance the abdominal ascites was accessed    via a percutaneousapproach with a 5 Hungarian Yueh needle. Ultrasound   images of the needle and catheter within the abdominal ascites were   obtained. A total of 5.2 L  of clear yellow fluid was drained. A specimen   was sent for cytology. The catheter was removed and asterile dressing   was placed. The patient tolerated the procedure well and left the   department in satisfactory condition. There were no immediate   complications.    Impression: Successful paracentesis with drainage of 5.2 L  of Clear   yellow fluid. Follow-up specimen results.    --- End of Report ---        < from: CT Chest No Cont (03.09.23 @ 15:15) >  ACC: [id number] EXAM:  CT CHEST   ORDERED BY: BRANDON LOWE     PROCEDURE DATE:  03/09/2023          INTERPRETATION:  CLINICAL INFORMATION: 77 years  Female with malig w/u.   Ovarian mass. Peritoneal carcinomatosis.    COMPARISON: None.    CONTRAST/COMPLICATIONS:  IV Contrast: NONE  Oral Contrast: NONE  Complications: None reported at time of study completion    PROCEDURE:  CT of the Chest was performed.  Sagittal and coronal reformats were performed.    LIMITATION: Evaluation of the solid vascular structures and glenna is   limited by lack of IV contrast. Motion artifact.    FINDINGS:    LUNGS AND AIRWAYS: Patent central airways.  Bilateral lower lobe passive   atelectasis. Bilateral upper lobe mild emphysema.  PLEURA: Large right pleural effusion and trace left pleural effusion.  MEDIASTINUM AND GLENNA: No mediastinal or hilar adenopathy allowing for   absence of IV contrast. 1.7 cm enlarged epicardial lymph node (6:84).  VESSELS: Aortic and coronary atherosclerosis.  HEART: Mild cardiomegaly. No pericardial effusion.  CHEST WALL AND LOWER NECK: Within normal limits. Anasarca.  VISUALIZED UPPER ABDOMEN: Ascites. Omental nodularity. 3.9 cm right   adrenal fatty mass consistent with myelolipoma.  BONES: Degenerative changes.    IMPRESSION:  Large right and small left pleural effusions with underlying passive   atelectasis.    Mild emphysema.    Abnormal epicardial lymph nodes.    Ascites with omental carcinomatosis.    Right adrenal myelolipoma.        --- End of Report ---  < from: US Pelvis Complete (US Pelvis Complete .) (03.09.23 @ 09:54) >  ACC: [id number] EXAM:  US PELVIC COMPLETE   ORDERED BY: MICHELLE ONEIL     PROCEDURE DATE:  03/09/2023          INTERPRETATION:  Peritoneal carcinomatosis is suggested on CT of 3/8/2023.    Transabdominal pelvic ultrasound.    Technically difficult study due to body habitus.    Uterus 7.5 x 5.7 x 4.6 cm unremarkable. Endometrium 1.1 cm homogeneous.  Normal ovaries are not defined. Multiple complex cystic structures in the   pelvis with tsolid and cystic components the largest measuring up to 10.5   cm. Findings likely reflect malignant ovarian neoplasm and associated   peritoneal metastases.  Moderate to large amount of abdominal and pelvic ascites.    IMPRESSION:    Findings suggestive of peritoneal carcinomatosis consistent with the CT   finding, likely secondary to ovarian neoplasm.    --- End of Report ---        < end of copied text >  < from: US Abdomen Complete (US Abdomen Complete .) (03.09.23 @ 09:53) >  ACC: [id number] EXAM:  US ABDOMEN COMPLETE   ORDERED BY: MICHELLE ONEIL     PROCEDURE DATE:  03/09/2023          INTERPRETATION:  CLINICAL INFORMATION: Elevated creatinine    COMPARISON: CT abdomen and pelvis 3/8/2023.    TECHNIQUE: Sonography of the abdomen.    FINDINGS:  Liver: Within normal limits.  Bile ducts: Normal caliber. Common bile duct measures 3 mm.  Gallbladder: Mild nonspecific gallbladder wall thickening likely   represents a reactive finding in this clinical setting related to ascites   or hypoalbuminemia. No gallstones.  Pancreas: Visualized portions are within normal limits.  Spleen: 7.5 cm. Within normal limits.  Right kidney: 9.8 cm. No hydronephrosis.  Left kidney: 10.8 cm. No hydronephrosis.  Ascites: Moderate to large volume  Aorta and IVC: Visualized portions are within normal limits.    IMPRESSION:  No acute findings.  Moderate to large volume ascites. Refer to recent CT report for   additional information.      < end of copied text >

## 2023-03-10 NOTE — PROGRESS NOTE ADULT - ASSESSMENT
77 years old female with h/o HTN, HLD, CAD s/p PCI in 2018, DM, obesity present to ED with complain of abdominal distension and pain. Found to have omental carcinomatosis, epicardial adenopathy and large right adnexal mass suspicious of ovarian malig.      #Malig w/u  -3/8-CT A/P positive for 7.9 x 5.2 x 5.9 cm right adnexal mass suspicious for ovarian malignancy, Moderate ascites and probable omental carcinomatosis, Epicardial adenopathy and Prominent retroperitoneal lymph nodes.  -CT-C showed Large right and small left pleural effusions with underlying passive atelectasis, Mild emphysema, Abnormal epicardial lymph nodes, Ascites with omental carcinomatosis, and Right adrenal myelolipoma.  -s/p 3/10 paracentesis 5.2L removed -pending cytopathology  -pending abd/pelvic US showed ascites and findings consistent with peritoneal carcinomatosis  most recent uric acid-11,  get daily uric acid/ LDH  - Ca-125-990, AFP-<1.6  -Pending  spep, flow, G6PD  -patient will need to f/u with heme/onc OP Dr. Robert SANDERSONA/Nirav [phone]  -will need GYN/ ONC F/u Dr. Sujata Gastelum  [phone]      #Normocytic Anemia   -presenting with normocytic anemia, no c/o active bleeding   -Hep panel/ HIV -negative   -total iron- 10, TIBC-182, % sat-5, folate-6.9, , Ferritin-106  -Bili-0.2, LDH-418 Hapto-304, Retic-1  -hemolysis unlikely more likely DARIO  -Rec Iron supplantation           Thank you for the referral. Will continue to monitor the patient.  Please call with any questions [phone]  Above reviewed with Attending Dr. Jones   [address] [facility]], [address], [place], [place]  [phone]  *Note not finalized until signed by Attending Physician     77 years old female with h/o HTN, HLD, CAD s/p PCI in 2018, DM, obesity present to ED with complain of abdominal distension and pain. Found to have omental carcinomatosis, epicardial adenopathy and large right adnexal mass suspicious of ovarian malig.      #Malig w/u  -3/8-CT A/P positive for 7.9 x 5.2 x 5.9 cm right adnexal mass suspicious for ovarian malignancy, Moderate ascites and probable omental carcinomatosis, Epicardial adenopathy and Prominent retroperitoneal lymph nodes.  -CT-C showed Large right and small left pleural effusions with underlying passive atelectasis, Mild emphysema, Abnormal epicardial lymph nodes, Ascites with omental carcinomatosis, and Right adrenal myelolipoma.  -s/p 3/10 paracentesis 5.2L removed -pending cytopathology  -pending abd/pelvic US showed ascites and findings consistent with peritoneal carcinomatosis  most recent uric acid-11,  get daily uric acid/ LDH  - Ca-125-990, AFP-<1.6  -Pending  spep, G6PD  -Flow WNL  -patient will need to f/u with heme/onc OP Dr. Jones A/Nirav [phone]  -will need GYN/ ONC F/u Dr. Sujata Gastelum  [phone]      #Normocytic Anemia   -presenting with normocytic anemia, no c/o active bleeding   -Hep panel/ HIV -negative   -total iron- 10, TIBC-182, % sat-5, folate-6.9, , Ferritin-106  -Bili-0.2, LDH-418 Hapto-304, Retic-1  -hemolysis unlikely more likely DARIO  -Rec Iron supplantation           Thank you for the referral. Will continue to monitor the patient.  Please call with any questions [phone]  Above reviewed with Attending Dr. Jones   [address] [facility], [address], [place], [place]  [phone]  *Note not finalized until signed by Attending Physician

## 2023-03-10 NOTE — DISCHARGE NOTE PROVIDER - HOSPITAL COURSE
· Assessment    77 years old female with h/o HTN, HLD, CAD s/p PCI in 2018, DM, obesity present to ED with complain of abdominal distension and pain. Patient reported gradual worsening of abdominal distension and pain for 1 week. Pain is more like generalized abdominal discomfort. Reported constipation and last bowel movement was 4 days ago. Still pass gas. Decrease oral intake due to no appetite.   Hemodynamically stable, afebrile, sat well at RA. No leukocytosis Hb 8.8, MCV 81.1, plt 505, Cr 3.88, K 5.8, glucose 136, lactate 1.7. CT abd/pelvis noted 7.9x5.2x5.9cm right adnexal mass suspicious for ovarian malignancy. Moderate ascites and probable omental carcinomatosis. Epicardial adenopathy. Prominent retroperitoneal lymph nodes      Ovarian cancer.   abdominal distension and discomfort  CT abd/pelvis noted 7.9x5.2x5.9cm right adnexal mass suspicious for ovarian malignancy. Moderate ascites and probable omental carcinomatosis. Epicardial adenopathy. Prominent retroperitoneal lymph nodes  Abd distension clinically significant-  Tumor marker  Oncology consulted- called via consult service line and left consult information with staff.  - Patient has an appointment scheduled w/ hem/onc and gyn/onc upon discharge.         Type 2 diabetes mellitus with unspecified complications.   - c/w insulin  - metformin discontinued due to kidney function  - please follow up with your primary care doctor       · Assessment    77 years old female with h/o HTN, HLD, CAD s/p PCI in 2018, DM, obesity present to ED with complain of abdominal distension and pain. Patient reported gradual worsening of abdominal distension and pain for 1 week. Pain is more like generalized abdominal discomfort. Reported constipation and last bowel movement was 4 days ago. Still pass gas. Decrease oral intake due to no appetite.   Hemodynamically stable, afebrile, sat well at RA. No leukocytosis Hb 8.8, MCV 81.1, plt 505, Cr 3.88, K 5.8, glucose 136, lactate 1.7. CT abd/pelvis noted 7.9x5.2x5.9cm right adnexal mass suspicious for ovarian malignancy. Moderate ascites and probable omental carcinomatosis. Epicardial adenopathy. Prominent retroperitoneal lymph nodes      Ovarian cancer.   abdominal distension and discomfort  CT abd/pelvis noted 7.9x5.2x5.9cm right adnexal mass suspicious for ovarian malignancy. Moderate ascites and probable omental carcinomatosis. Epicardial adenopathy. Prominent retroperitoneal lymph nodes  Abd distension clinically significant-  Tumor marker  Oncology consulted- called via consult service line and left consult information with staff.  - Patient has an appointment scheduled w/ hem/onc and gyn/onc upon discharge.       Essential HTN  c/w Metorpolol as well amlodipine  - dc HCTZ due to renal function      Type 2 diabetes mellitus with unspecified complications.   - c/w insulin  - metformin discontinued due to kidney function  - please follow up with your primary care doctor

## 2023-03-14 LAB — G6PD RBC-CCNC: 26.5 U/G HGB — HIGH (ref 7–20.5)

## 2023-03-15 DIAGNOSIS — Z79.84 LONG TERM (CURRENT) USE OF ORAL HYPOGLYCEMIC DRUGS: ICD-10-CM

## 2023-03-15 DIAGNOSIS — E66.9 OBESITY, UNSPECIFIED: ICD-10-CM

## 2023-03-15 DIAGNOSIS — R18.0 MALIGNANT ASCITES: ICD-10-CM

## 2023-03-15 DIAGNOSIS — E78.5 HYPERLIPIDEMIA, UNSPECIFIED: ICD-10-CM

## 2023-03-15 DIAGNOSIS — I10 ESSENTIAL (PRIMARY) HYPERTENSION: ICD-10-CM

## 2023-03-15 DIAGNOSIS — I25.10 ATHEROSCLEROTIC HEART DISEASE OF NATIVE CORONARY ARTERY WITHOUT ANGINA PECTORIS: ICD-10-CM

## 2023-03-15 DIAGNOSIS — I25.2 OLD MYOCARDIAL INFARCTION: ICD-10-CM

## 2023-03-15 DIAGNOSIS — K59.00 CONSTIPATION, UNSPECIFIED: ICD-10-CM

## 2023-03-15 DIAGNOSIS — D63.0 ANEMIA IN NEOPLASTIC DISEASE: ICD-10-CM

## 2023-03-15 DIAGNOSIS — N17.9 ACUTE KIDNEY FAILURE, UNSPECIFIED: ICD-10-CM

## 2023-03-15 DIAGNOSIS — E11.9 TYPE 2 DIABETES MELLITUS WITHOUT COMPLICATIONS: ICD-10-CM

## 2023-03-15 DIAGNOSIS — R10.9 UNSPECIFIED ABDOMINAL PAIN: ICD-10-CM

## 2023-03-15 DIAGNOSIS — E87.5 HYPERKALEMIA: ICD-10-CM

## 2023-03-15 DIAGNOSIS — Z79.82 LONG TERM (CURRENT) USE OF ASPIRIN: ICD-10-CM

## 2023-03-15 DIAGNOSIS — C78.6 SECONDARY MALIGNANT NEOPLASM OF RETROPERITONEUM AND PERITONEUM: ICD-10-CM

## 2023-03-15 DIAGNOSIS — C56.9 MALIGNANT NEOPLASM OF UNSPECIFIED OVARY: ICD-10-CM

## 2023-03-15 LAB
% ALBUMIN: 44.9 % — SIGNIFICANT CHANGE UP
% ALPHA 1: 8.5 % — SIGNIFICANT CHANGE UP
% ALPHA 2: 12.5 % — SIGNIFICANT CHANGE UP
% BETA: 11.5 % — SIGNIFICANT CHANGE UP
% GAMMA: 22.6 % — SIGNIFICANT CHANGE UP
ALBUMIN SERPL ELPH-MCNC: 2.7 G/DL — LOW (ref 3.6–5.5)
ALBUMIN/GLOB SERPL ELPH: 0.8 RATIO — SIGNIFICANT CHANGE UP
ALPHA1 GLOB SERPL ELPH-MCNC: 0.5 G/DL — HIGH (ref 0.1–0.4)
ALPHA2 GLOB SERPL ELPH-MCNC: 0.8 G/DL — SIGNIFICANT CHANGE UP (ref 0.5–1)
B-GLOBULIN SERPL ELPH-MCNC: 0.7 G/DL — SIGNIFICANT CHANGE UP (ref 0.5–1)
COMMENT - FLUIDS: SIGNIFICANT CHANGE UP
CULTURE RESULTS: NO GROWTH — SIGNIFICANT CHANGE UP
GAMMA GLOBULIN: 1.4 G/DL — SIGNIFICANT CHANGE UP (ref 0.6–1.6)
GRAM STN FLD: SIGNIFICANT CHANGE UP
INTERPRETATION SERPL IFE-IMP: SIGNIFICANT CHANGE UP
NON-GYNECOLOGICAL CYTOLOGY STUDY: SIGNIFICANT CHANGE UP
PROT PATTERN SERPL ELPH-IMP: SIGNIFICANT CHANGE UP
SPECIMEN SOURCE: SIGNIFICANT CHANGE UP

## 2023-03-17 ENCOUNTER — NON-APPOINTMENT (OUTPATIENT)
Age: 78
End: 2023-03-17

## 2023-03-17 PROBLEM — Z00.00 ENCOUNTER FOR PREVENTIVE HEALTH EXAMINATION: Status: ACTIVE | Noted: 2023-03-17

## 2023-03-20 NOTE — H&P ADULT - NSHPLABSRESULTS_GEN_ALL_CORE
abdominal pain
04-10    134<L>  |  99  |  22  ----------------------------<  268<H>  3.5   |  21<L>  |  0.89    Ca    9.9      10 Apr 2018 20:06  Mg     1.8     04-10    TPro  6.7  /  Alb  3.8  /  TBili  0.3  /  DBili  x   /  AST  31  /  ALT  18  /  AlkPhos  103  04-10                                              11.1   10.8  )-----------( 250      ( 10 Apr 2018 20:06 )             33.9     CAPILLARY BLOOD GLUCOSE      POCT Blood Glucose.: 231 mg/dL (10 Apr 2018 19:51)

## 2023-03-21 ENCOUNTER — INPATIENT (INPATIENT)
Facility: HOSPITAL | Age: 78
LOS: 3 days | Discharge: HOME | DRG: 754 | End: 2023-03-25
Attending: STUDENT IN AN ORGANIZED HEALTH CARE EDUCATION/TRAINING PROGRAM | Admitting: HOSPITALIST
Payer: COMMERCIAL

## 2023-03-21 ENCOUNTER — OUTPATIENT (OUTPATIENT)
Dept: OUTPATIENT SERVICES | Facility: HOSPITAL | Age: 78
LOS: 1 days | End: 2023-03-21
Payer: COMMERCIAL

## 2023-03-21 ENCOUNTER — NON-APPOINTMENT (OUTPATIENT)
Age: 78
End: 2023-03-21

## 2023-03-21 ENCOUNTER — APPOINTMENT (OUTPATIENT)
Dept: ULTRASOUND IMAGING | Facility: IMAGING CENTER | Age: 78
End: 2023-03-21
Payer: MEDICARE

## 2023-03-21 ENCOUNTER — APPOINTMENT (OUTPATIENT)
Dept: GYNECOLOGIC ONCOLOGY | Facility: CLINIC | Age: 78
End: 2023-03-21
Payer: MEDICARE

## 2023-03-21 VITALS
RESPIRATION RATE: 20 BRPM | HEART RATE: 75 BPM | WEIGHT: 199.96 LBS | TEMPERATURE: 98 F | OXYGEN SATURATION: 98 % | DIASTOLIC BLOOD PRESSURE: 53 MMHG | SYSTOLIC BLOOD PRESSURE: 125 MMHG | HEIGHT: 66 IN

## 2023-03-21 DIAGNOSIS — R18.0 MALIGNANT ASCITES: ICD-10-CM

## 2023-03-21 DIAGNOSIS — Z60.2 PROBLEMS RELATED TO LIVING ALONE: ICD-10-CM

## 2023-03-21 DIAGNOSIS — C56.9 MALIGNANT NEOPLASM OF UNSPECIFIED OVARY: ICD-10-CM

## 2023-03-21 DIAGNOSIS — J96.01 ACUTE RESPIRATORY FAILURE WITH HYPOXIA: ICD-10-CM

## 2023-03-21 DIAGNOSIS — Z86.79 PERSONAL HISTORY OF OTHER DISEASES OF THE CIRCULATORY SYSTEM: ICD-10-CM

## 2023-03-21 DIAGNOSIS — Z29.9 ENCOUNTER FOR PROPHYLACTIC MEASURES, UNSPECIFIED: ICD-10-CM

## 2023-03-21 DIAGNOSIS — R18.8 OTHER ASCITES: ICD-10-CM

## 2023-03-21 DIAGNOSIS — Z86.39 PERSONAL HISTORY OF OTHER ENDOCRINE, NUTRITIONAL AND METABOLIC DISEASE: ICD-10-CM

## 2023-03-21 DIAGNOSIS — I10 ESSENTIAL (PRIMARY) HYPERTENSION: ICD-10-CM

## 2023-03-21 DIAGNOSIS — Z98.891 HISTORY OF UTERINE SCAR FROM PREVIOUS SURGERY: Chronic | ICD-10-CM

## 2023-03-21 DIAGNOSIS — D50.9 IRON DEFICIENCY ANEMIA, UNSPECIFIED: ICD-10-CM

## 2023-03-21 DIAGNOSIS — R06.02 SHORTNESS OF BREATH: ICD-10-CM

## 2023-03-21 DIAGNOSIS — C80.0 DISSEMINATED MALIGNANT NEOPLASM, UNSPECIFIED: ICD-10-CM

## 2023-03-21 DIAGNOSIS — E11.9 TYPE 2 DIABETES MELLITUS WITHOUT COMPLICATIONS: ICD-10-CM

## 2023-03-21 DIAGNOSIS — Z78.9 OTHER SPECIFIED HEALTH STATUS: ICD-10-CM

## 2023-03-21 DIAGNOSIS — R79.89 OTHER SPECIFIED ABNORMAL FINDINGS OF BLOOD CHEMISTRY: ICD-10-CM

## 2023-03-21 LAB
ALBUMIN SERPL ELPH-MCNC: 2.7 G/DL — LOW (ref 3.3–5)
ALP SERPL-CCNC: 133 U/L — HIGH (ref 40–120)
ALT FLD-CCNC: 12 U/L — SIGNIFICANT CHANGE UP (ref 10–45)
ANION GAP SERPL CALC-SCNC: 11 MMOL/L — SIGNIFICANT CHANGE UP (ref 5–17)
ANION GAP SERPL CALC-SCNC: 12 MMOL/L — SIGNIFICANT CHANGE UP (ref 5–17)
APTT BLD: 25.3 SEC — LOW (ref 27.5–35.5)
AST SERPL-CCNC: 20 U/L — SIGNIFICANT CHANGE UP (ref 10–40)
BASE EXCESS BLDV CALC-SCNC: -5.8 MMOL/L — LOW (ref -2–3)
BASOPHILS # BLD AUTO: 0.03 K/UL — SIGNIFICANT CHANGE UP (ref 0–0.2)
BASOPHILS NFR BLD AUTO: 0.2 % — SIGNIFICANT CHANGE UP (ref 0–2)
BILIRUB SERPL-MCNC: 0.4 MG/DL — SIGNIFICANT CHANGE UP (ref 0.2–1.2)
BUN SERPL-MCNC: 52 MG/DL — HIGH (ref 7–23)
BUN SERPL-MCNC: 54 MG/DL — HIGH (ref 7–23)
CA-I SERPL-SCNC: 1.27 MMOL/L — SIGNIFICANT CHANGE UP (ref 1.15–1.33)
CALCIUM SERPL-MCNC: 8.7 MG/DL — SIGNIFICANT CHANGE UP (ref 8.4–10.5)
CALCIUM SERPL-MCNC: 9.4 MG/DL — SIGNIFICANT CHANGE UP (ref 8.4–10.5)
CHLORIDE BLDV-SCNC: 110 MMOL/L — HIGH (ref 96–108)
CHLORIDE SERPL-SCNC: 109 MMOL/L — HIGH (ref 96–108)
CHLORIDE SERPL-SCNC: 111 MMOL/L — HIGH (ref 96–108)
CO2 BLDV-SCNC: 22 MMOL/L — SIGNIFICANT CHANGE UP (ref 22–26)
CO2 SERPL-SCNC: 18 MMOL/L — LOW (ref 22–31)
CO2 SERPL-SCNC: 18 MMOL/L — LOW (ref 22–31)
CREAT SERPL-MCNC: 1.86 MG/DL — HIGH (ref 0.5–1.3)
CREAT SERPL-MCNC: 1.92 MG/DL — HIGH (ref 0.5–1.3)
EGFR: 27 ML/MIN/1.73M2 — LOW
EGFR: 28 ML/MIN/1.73M2 — LOW
EOSINOPHIL # BLD AUTO: 0.08 K/UL — SIGNIFICANT CHANGE UP (ref 0–0.5)
EOSINOPHIL NFR BLD AUTO: 0.5 % — SIGNIFICANT CHANGE UP (ref 0–6)
GAS PNL BLDV: 138 MMOL/L — SIGNIFICANT CHANGE UP (ref 136–145)
GAS PNL BLDV: SIGNIFICANT CHANGE UP
GLUCOSE BLDV-MCNC: 266 MG/DL — HIGH (ref 70–99)
GLUCOSE SERPL-MCNC: 276 MG/DL — HIGH (ref 70–99)
GLUCOSE SERPL-MCNC: 295 MG/DL — HIGH (ref 70–99)
HCO3 BLDV-SCNC: 20 MMOL/L — LOW (ref 22–29)
HCT VFR BLD CALC: 26.1 % — LOW (ref 34.5–45)
HCT VFR BLDA CALC: 25 % — LOW (ref 34.5–46.5)
HGB BLD CALC-MCNC: 8.4 G/DL — LOW (ref 11.7–16.1)
HGB BLD-MCNC: 8 G/DL — LOW (ref 11.5–15.5)
IMM GRANULOCYTES NFR BLD AUTO: 0.7 % — SIGNIFICANT CHANGE UP (ref 0–0.9)
INR BLD: 1.15 RATIO — SIGNIFICANT CHANGE UP (ref 0.88–1.16)
LACTATE BLDV-MCNC: 1.3 MMOL/L — SIGNIFICANT CHANGE UP (ref 0.5–2)
LYMPHOCYTES # BLD AUTO: 1.05 K/UL — SIGNIFICANT CHANGE UP (ref 1–3.3)
LYMPHOCYTES # BLD AUTO: 6.9 % — LOW (ref 13–44)
MCHC RBC-ENTMCNC: 23.6 PG — LOW (ref 27–34)
MCHC RBC-ENTMCNC: 30.7 GM/DL — LOW (ref 32–36)
MCV RBC AUTO: 77 FL — LOW (ref 80–100)
MONOCYTES # BLD AUTO: 0.66 K/UL — SIGNIFICANT CHANGE UP (ref 0–0.9)
MONOCYTES NFR BLD AUTO: 4.3 % — SIGNIFICANT CHANGE UP (ref 2–14)
NEUTROPHILS # BLD AUTO: 13.33 K/UL — HIGH (ref 1.8–7.4)
NEUTROPHILS NFR BLD AUTO: 87.4 % — HIGH (ref 43–77)
NRBC # BLD: 0 /100 WBCS — SIGNIFICANT CHANGE UP (ref 0–0)
NT-PROBNP SERPL-SCNC: 352 PG/ML — HIGH (ref 0–300)
PCO2 BLDV: 41 MMHG — SIGNIFICANT CHANGE UP (ref 39–42)
PH BLDV: 7.3 — LOW (ref 7.32–7.43)
PLATELET # BLD AUTO: 514 K/UL — HIGH (ref 150–400)
PO2 BLDV: 39 MMHG — SIGNIFICANT CHANGE UP (ref 25–45)
POTASSIUM BLDV-SCNC: 6.2 MMOL/L — CRITICAL HIGH (ref 3.5–5.1)
POTASSIUM SERPL-MCNC: 5.4 MMOL/L — HIGH (ref 3.5–5.3)
POTASSIUM SERPL-MCNC: 6.1 MMOL/L — HIGH (ref 3.5–5.3)
POTASSIUM SERPL-SCNC: 5.4 MMOL/L — HIGH (ref 3.5–5.3)
POTASSIUM SERPL-SCNC: 6.1 MMOL/L — HIGH (ref 3.5–5.3)
PROT SERPL-MCNC: 6.1 G/DL — SIGNIFICANT CHANGE UP (ref 6–8.3)
PROTHROM AB SERPL-ACNC: 13.4 SEC — SIGNIFICANT CHANGE UP (ref 10.5–13.4)
RAPID RVP RESULT: SIGNIFICANT CHANGE UP
RBC # BLD: 3.39 M/UL — LOW (ref 3.8–5.2)
RBC # FLD: 19.3 % — HIGH (ref 10.3–14.5)
SAO2 % BLDV: 58.3 % — LOW (ref 67–88)
SARS-COV-2 RNA SPEC QL NAA+PROBE: SIGNIFICANT CHANGE UP
SODIUM SERPL-SCNC: 139 MMOL/L — SIGNIFICANT CHANGE UP (ref 135–145)
SODIUM SERPL-SCNC: 140 MMOL/L — SIGNIFICANT CHANGE UP (ref 135–145)
TROPONIN T, HIGH SENSITIVITY RESULT: 50 NG/L — SIGNIFICANT CHANGE UP (ref 0–51)
WBC # BLD: 15.25 K/UL — HIGH (ref 3.8–10.5)
WBC # FLD AUTO: 15.25 K/UL — HIGH (ref 3.8–10.5)

## 2023-03-21 PROCEDURE — 99497 ADVNCD CARE PLAN 30 MIN: CPT | Mod: 25

## 2023-03-21 PROCEDURE — 49083 ABD PARACENTESIS W/IMAGING: CPT

## 2023-03-21 PROCEDURE — 71250 CT THORAX DX C-: CPT | Mod: 26,MA

## 2023-03-21 PROCEDURE — 99291 CRITICAL CARE FIRST HOUR: CPT

## 2023-03-21 PROCEDURE — 99205 OFFICE O/P NEW HI 60 MIN: CPT

## 2023-03-21 PROCEDURE — 71045 X-RAY EXAM CHEST 1 VIEW: CPT | Mod: 26

## 2023-03-21 PROCEDURE — 99223 1ST HOSP IP/OBS HIGH 75: CPT

## 2023-03-21 RX ORDER — ONDANSETRON 8 MG/1
4 TABLET, FILM COATED ORAL EVERY 8 HOURS
Refills: 0 | Status: DISCONTINUED | OUTPATIENT
Start: 2023-03-21 | End: 2023-03-25

## 2023-03-21 RX ORDER — DEXTROSE 50 % IN WATER 50 %
12.5 SYRINGE (ML) INTRAVENOUS ONCE
Refills: 0 | Status: DISCONTINUED | OUTPATIENT
Start: 2023-03-21 | End: 2023-03-25

## 2023-03-21 RX ORDER — CEFEPIME 1 G/1
2000 INJECTION, POWDER, FOR SOLUTION INTRAMUSCULAR; INTRAVENOUS ONCE
Refills: 0 | Status: COMPLETED | OUTPATIENT
Start: 2023-03-21 | End: 2023-03-21

## 2023-03-21 RX ORDER — DEXTROSE 50 % IN WATER 50 %
50 SYRINGE (ML) INTRAVENOUS ONCE
Refills: 0 | Status: COMPLETED | OUTPATIENT
Start: 2023-03-21 | End: 2023-03-21

## 2023-03-21 RX ORDER — POTASSIUM CHLORIDE 750 MG/1
10 TABLET, FILM COATED, EXTENDED RELEASE ORAL
Qty: 28 | Refills: 0 | Status: ACTIVE | COMMUNITY
Start: 2023-01-19

## 2023-03-21 RX ORDER — ACETAMINOPHEN 500 MG
650 TABLET ORAL EVERY 6 HOURS
Refills: 0 | Status: DISCONTINUED | OUTPATIENT
Start: 2023-03-21 | End: 2023-03-25

## 2023-03-21 RX ORDER — AMLODIPINE BESYLATE 10 MG/1
10 TABLET ORAL
Qty: 28 | Refills: 0 | Status: ACTIVE | COMMUNITY
Start: 2023-02-12

## 2023-03-21 RX ORDER — ISOPROPYL ALCOHOL 70 ML/100ML
70 SWAB TOPICAL
Qty: 100 | Refills: 0 | Status: ACTIVE | COMMUNITY
Start: 2023-03-10

## 2023-03-21 RX ORDER — LIDOCAINE 5 G/100G
5 OINTMENT TOPICAL
Qty: 50 | Refills: 0 | Status: ACTIVE | COMMUNITY
Start: 2022-11-18

## 2023-03-21 RX ORDER — SODIUM CHLORIDE 9 MG/ML
1000 INJECTION, SOLUTION INTRAVENOUS
Refills: 0 | Status: DISCONTINUED | OUTPATIENT
Start: 2023-03-21 | End: 2023-03-25

## 2023-03-21 RX ORDER — ATORVASTATIN CALCIUM 80 MG/1
80 TABLET, FILM COATED ORAL
Qty: 28 | Refills: 0 | Status: ACTIVE | COMMUNITY
Start: 2023-03-10

## 2023-03-21 RX ORDER — POTASSIUM CHLORIDE 750 MG/1
10 TABLET, EXTENDED RELEASE ORAL
Qty: 28 | Refills: 0 | Status: ACTIVE | COMMUNITY
Start: 2023-03-10

## 2023-03-21 RX ORDER — DEXTROSE 50 % IN WATER 50 %
25 SYRINGE (ML) INTRAVENOUS ONCE
Refills: 0 | Status: DISCONTINUED | OUTPATIENT
Start: 2023-03-21 | End: 2023-03-25

## 2023-03-21 RX ORDER — INSULIN LISPRO 100/ML
VIAL (ML) SUBCUTANEOUS AT BEDTIME
Refills: 0 | Status: DISCONTINUED | OUTPATIENT
Start: 2023-03-21 | End: 2023-03-25

## 2023-03-21 RX ORDER — LATANOPROST/PF 0.005 %
0.01 DROPS OPHTHALMIC (EYE)
Qty: 2 | Refills: 0 | Status: ACTIVE | COMMUNITY
Start: 2023-02-14

## 2023-03-21 RX ORDER — DEXTROSE 50 % IN WATER 50 %
15 SYRINGE (ML) INTRAVENOUS ONCE
Refills: 0 | Status: DISCONTINUED | OUTPATIENT
Start: 2023-03-21 | End: 2023-03-25

## 2023-03-21 RX ORDER — METOPROLOL SUCCINATE 200 MG/1
200 TABLET, EXTENDED RELEASE ORAL
Qty: 28 | Refills: 0 | Status: ACTIVE | COMMUNITY
Start: 2023-03-10

## 2023-03-21 RX ORDER — LIDOCAINE 5% 700 MG/1
5 PATCH TOPICAL
Qty: 90 | Refills: 0 | Status: ACTIVE | COMMUNITY
Start: 2022-12-30

## 2023-03-21 RX ORDER — METFORMIN HYDROCHLORIDE 1000 MG/1
1000 TABLET, COATED ORAL
Qty: 56 | Refills: 0 | Status: ACTIVE | COMMUNITY
Start: 2023-02-12

## 2023-03-21 RX ORDER — INSULIN ASPART 100 [IU]/ML
100 INJECTION, SOLUTION INTRAVENOUS; SUBCUTANEOUS
Qty: 9 | Refills: 0 | Status: ACTIVE | COMMUNITY
Start: 2023-03-10

## 2023-03-21 RX ORDER — CALCIUM GLUCONATE 100 MG/ML
2 VIAL (ML) INTRAVENOUS ONCE
Refills: 0 | Status: COMPLETED | OUTPATIENT
Start: 2023-03-21 | End: 2023-03-21

## 2023-03-21 RX ORDER — VANCOMYCIN HCL 1 G
1000 VIAL (EA) INTRAVENOUS ONCE
Refills: 0 | Status: COMPLETED | OUTPATIENT
Start: 2023-03-21 | End: 2023-03-21

## 2023-03-21 RX ORDER — ASPIRIN/CALCIUM CARB/MAGNESIUM 324 MG
81 TABLET ORAL DAILY
Refills: 0 | Status: DISCONTINUED | OUTPATIENT
Start: 2023-03-21 | End: 2023-03-25

## 2023-03-21 RX ORDER — LANOLIN ALCOHOL/MO/W.PET/CERES
3 CREAM (GRAM) TOPICAL AT BEDTIME
Refills: 0 | Status: DISCONTINUED | OUTPATIENT
Start: 2023-03-21 | End: 2023-03-25

## 2023-03-21 RX ORDER — FUROSEMIDE 40 MG
40 TABLET ORAL ONCE
Refills: 0 | Status: DISCONTINUED | OUTPATIENT
Start: 2023-03-21 | End: 2023-03-21

## 2023-03-21 RX ORDER — INSULIN HUMAN 100 [IU]/ML
5 INJECTION, SOLUTION SUBCUTANEOUS ONCE
Refills: 0 | Status: COMPLETED | OUTPATIENT
Start: 2023-03-21 | End: 2023-03-21

## 2023-03-21 RX ORDER — BLOOD SUGAR DIAGNOSTIC
STRIP MISCELLANEOUS
Qty: 100 | Refills: 0 | Status: ACTIVE | COMMUNITY
Start: 2023-02-28

## 2023-03-21 RX ORDER — HYDROCHLOROTHIAZIDE 25 MG/1
25 TABLET ORAL
Qty: 28 | Refills: 0 | Status: ACTIVE | COMMUNITY
Start: 2023-02-12

## 2023-03-21 RX ORDER — BLOOD-GLUCOSE METER
W/DEVICE KIT MISCELLANEOUS
Qty: 1 | Refills: 0 | Status: ACTIVE | COMMUNITY
Start: 2022-11-07

## 2023-03-21 RX ORDER — ISOSORBIDE MONONITRATE 60 MG/1
60 TABLET, EXTENDED RELEASE ORAL
Qty: 28 | Refills: 0 | Status: ACTIVE | COMMUNITY
Start: 2023-03-10

## 2023-03-21 RX ORDER — POLYETHYLENE GLYCOL 3350 17 G/17G
17 POWDER, FOR SOLUTION ORAL
Qty: 510 | Refills: 0 | Status: ACTIVE | COMMUNITY
Start: 2023-03-12

## 2023-03-21 RX ORDER — PEN NEEDLE, DIABETIC 29 G X1/2"
32G X 4 MM NEEDLE, DISPOSABLE MISCELLANEOUS
Qty: 100 | Refills: 0 | Status: ACTIVE | COMMUNITY
Start: 2023-03-10

## 2023-03-21 RX ORDER — INSULIN LISPRO 100/ML
VIAL (ML) SUBCUTANEOUS
Refills: 0 | Status: DISCONTINUED | OUTPATIENT
Start: 2023-03-21 | End: 2023-03-25

## 2023-03-21 RX ORDER — LISINOPRIL 40 MG/1
40 TABLET ORAL
Qty: 14 | Refills: 0 | Status: ACTIVE | COMMUNITY
Start: 2023-02-10

## 2023-03-21 RX ORDER — SODIUM CHLORIDE 9 MG/ML
500 INJECTION INTRAMUSCULAR; INTRAVENOUS; SUBCUTANEOUS ONCE
Refills: 0 | Status: COMPLETED | OUTPATIENT
Start: 2023-03-21 | End: 2023-03-21

## 2023-03-21 RX ORDER — GLUCAGON INJECTION, SOLUTION 0.5 MG/.1ML
1 INJECTION, SOLUTION SUBCUTANEOUS ONCE
Refills: 0 | Status: DISCONTINUED | OUTPATIENT
Start: 2023-03-21 | End: 2023-03-25

## 2023-03-21 RX ORDER — SODIUM ZIRCONIUM CYCLOSILICATE 10 G/10G
5 POWDER, FOR SUSPENSION ORAL ONCE
Refills: 0 | Status: COMPLETED | OUTPATIENT
Start: 2023-03-21 | End: 2023-03-21

## 2023-03-21 RX ADMIN — INSULIN HUMAN 5 UNIT(S): 100 INJECTION, SOLUTION SUBCUTANEOUS at 19:59

## 2023-03-21 RX ADMIN — Medication 50 MILLILITER(S): at 19:58

## 2023-03-21 RX ADMIN — SODIUM CHLORIDE 500 MILLILITER(S): 9 INJECTION INTRAMUSCULAR; INTRAVENOUS; SUBCUTANEOUS at 21:34

## 2023-03-21 RX ADMIN — CEFEPIME 100 MILLIGRAM(S): 1 INJECTION, POWDER, FOR SOLUTION INTRAMUSCULAR; INTRAVENOUS at 20:47

## 2023-03-21 RX ADMIN — SODIUM ZIRCONIUM CYCLOSILICATE 5 GRAM(S): 10 POWDER, FOR SUSPENSION ORAL at 20:20

## 2023-03-21 RX ADMIN — Medication 250 MILLIGRAM(S): at 21:34

## 2023-03-21 RX ADMIN — Medication 200 GRAM(S): at 20:16

## 2023-03-21 SDOH — SOCIAL STABILITY - SOCIAL INSECURITY: PROBLEMS RELATED TO LIVING ALONE: Z60.2

## 2023-03-21 NOTE — H&P ADULT - HISTORY OF PRESENT ILLNESS
77 year old female with a PMHx of HTN, HLD, CAD s/p PCI in 2018, DM, recent admission and newly diagnosed with ovarian cancer c/b peritoneal carciomatosis (no active chemo/radiation)        ED course: the patient was hypoxic to the 80s on RA, improved to >95% with 8L NRB. She was subsequently placed on BiPAP for respiratory distress -> developed hypotension to the 100s systolic -> given  cc and placed on HFNC. ED Pocus showed large right pleural effusion, small left pleural effusion and collapsible IVC. She was treated with cefepime and vancomycin empirically. Hypokalemia to 6.1, given insulin, dextrose, calcium gluconate, and lokelma. 77 year old female with a PMHx of HTN, HLD, CAD s/p PCI in 2018, DM2, recent admission and newly diagnosed with ovarian cancer c/b peritoneal carcinomatosis (not on active chemo/radiation) who presents from outpatient office for hypoxia. The patient states she has been experiencing gradually worsening dyspnea on exertion and occasionally at rest over the past several weeks. There is no associated cough, fevers, chills, or chest pain. She was recently seen at [facility], diagnosed with metastatic ovarian cancer with peritoneal carcionmatosis and YAN. She was discharged with outpatient follow up. Pt states she now has had a total of two large-volume paracentesis, one about two weeks ago and one just prior to arrival. Per chart review, 4L of ascites fluid was removed. Pt states her shortness of breath improved after today's paracentesis; however, she was still found to be hypoxic and was brought to the ED for further evaluation.     ED course: the patient was hypoxic to the 80s on RA, improved to >95% with 8L NRB. She was subsequently placed on BiPAP for respiratory distress -> developed hypotension to the 100s systolic -> given  cc and placed on HFNC. ED Pocus showed large right pleural effusion, small left pleural effusion and collapsible IVC. She was treated with cefepime and vancomycin empirically. Hypokalemia to 6.1, given insulin, dextrose, calcium gluconate, and lokelma. 77 year old female with a PMHx of HTN, HLD, CAD s/p PCI in 2018, DM2, recent admission and newly diagnosed with ovarian cancer c/b peritoneal carcinomatosis (not on active chemo/radiation) who presents from outpatient office for hypoxia. The patient states she has been experiencing gradually worsening dyspnea on exertion and occasionally at rest over the past several weeks. There is no associated cough, fevers, chills, or chest pain. She was recently seen at [facility], diagnosed with metastatic ovarian cancer with peritoneal carcionmatosis and YAN. She had a large-volume paracentesis on 3/10 where 5.2L were removed. Cytopathology was positive for malignant cells. Since discharge, she had a second paracentesis just prior to arrival today where 4L of ascites fluid was removed. Pt states her shortness of breath improved after today's paracentesis; however, she was still found to be hypoxic and was brought to the ED for further evaluation.     ED course: the patient was hypoxic to the 80s on RA, improved to >95% with 8L NRB. She was subsequently placed on BiPAP for respiratory distress -> developed hypotension to the 100s systolic -> given  cc and placed on HFNC. ED Pocus showed large right pleural effusion, small left pleural effusion and collapsible IVC. She was treated with cefepime and vancomycin empirically. Hypokalemia to 6.1, given insulin, dextrose, calcium gluconate, and lokelma.

## 2023-03-21 NOTE — H&P ADULT - PROBLEM SELECTOR PLAN 7
- DVT ppx: hold for now, pending thoracentesis. Please start heparin subq post procedure  - GI ppx: none  - Diet: NPO, pending clinical course  - Code status: Full Code

## 2023-03-21 NOTE — ED PROVIDER NOTE - ATTENDING CONTRIBUTION TO CARE
I, Branden Mccray, performed a history and physical exam of the patient and discussed their management with the resident and/or advanced care provider. I reviewed the resident and/or advanced care provider's note and agree with the documented findings and plan of care except where noted. I was present and available for all procedures.     78 yo F PMHx htn, hld, cad s/p PCI, DM, ovarian CA with omental carcinomatosis (dx'd 2 weeks ago) s/p LVP last week and then today (removed 4L today), presents from outpatient office for SOB. She has progressively been having worsening SOB/SNOW upon waking up this morning. She was noted to be hypoxemic to the 70s/80s on RA and was sent to the ED for further evaluation. She denmies any worsening of her SOB after or during her LVP today. She denies fevers, chills, CP, abd pain, n/v/d, dysuria, cough, rashes.     on exam, pt tachypneic, hypoxemic, on 15L nrb. decreased BS entire r lung field. pitting 2+ edema of b/l lower extremities. abd soft, nt nd. site where pt had LVP appears c/d/i, no oozing or bleeding. neuro intact. not tachycardic, regular rhythm; nl s1/s2.    mdm: pt w/ pmhx ovarian ca s/p lvp today presents for sob. pt hypoxemic and tachypneic w/ decreased bs on R; pocus showing large R pleural effusion, hyperdynamic LV. no large pericardial effusion visualized. pt's sx likely 2/2 to this large R pleural effusion, likely malignant in etiology. PE is less liekly w/ this large pleural effusion. lower suspcion for acs. lower suspucion for pna. Will check CBC to eval WBC, anemia, plt count; CMP to eval for lyte abnormalities, renal and/or liver dysfunction. trop and bnp for RH dysfunction. vbg to eval for pH and co2. cxr. will require admission, will place on bipap for resp distress. will reassess

## 2023-03-21 NOTE — ED ADULT NURSE REASSESSMENT NOTE - NS ED NURSE REASSESS COMMENT FT1
Pt sating 92% on 6L nasal cannula. MD Mccray aware, pt called for HFNC Pt sating 92% on 6L nasal cannula. MD Mccray aware, MD called respiratory for HFNC

## 2023-03-21 NOTE — ED PROVIDER NOTE - OBJECTIVE STATEMENT
78 y/o female with pmhx of HTN, HLD, CAD s/p PCI in 2018 (not on AC), DM, obesity, recent admission and newly diagnosed with ovarian cancer 2 weeks ago (no active chemo/radiation) presenting with shortness of breath. Patient woke up short of breath this morning, worse with exertion, had outpatient paracentesis performed today routinely, 4 L removed, oxygen was checked after procedure and noted to be in high 70s/low 80s patient sent to ER for further eval. Patient denies any acutely worsening of shortness of breath during procedure. Also denies any LOC, chest pain, palpitations, diaphoresis, focal weakness, numbness/tingling, fever/chills, nausea/vomiting/diarrhea, cough, rashes, or changes in urination.  Denies any history of blood clots, recent leg pain, recent bedbound nature/travel, hemoptysis but does report increasing leg swelling in the past couple weeks. 78 y/o female with pmhx of HTN, HLD, CAD s/p PCI in 2018 (not on AC), DM, obesity, recent admission and newly diagnosed with ovarian cancer 2 weeks ago (no active chemo/radiation) presenting with shortness of breath. Patient woke up short of breath this morning, worse with exertion, had outpatient paracentesis performed today routinely, 4 L removed, oxygen was checked after procedure and noted to be in high 70s/low 80s patient sent to ER for further eval. Patient denies any acutely worsening of shortness of breath during procedure. Also denies any LOC, chest pain, palpitations, diaphoresis, focal weakness, numbness/tingling, fever/chills, nausea/vomiting/diarrhea, cough, rashes, or changes in urination.  Denies any history of blood clots, recent leg pain, recent bedbound nature/travel, hemoptysis but does report increasing leg swelling in the past couple weeks.  PMD Dr. Kajal Mckeon  Gyn/ONC Dr. Sparkle Brown  Heme/onc Dr. Ramón Jones  Son, Vince Guillory [phone]

## 2023-03-21 NOTE — ED ADULT NURSE NOTE - NSIMPLEMENTINTERV_GEN_ALL_ED
Implemented All Fall Risk Interventions:  [place] to call system. Call bell, personal items and telephone within reach. Instruct patient to call for assistance. Room bathroom lighting operational. Non-slip footwear when patient is off stretcher. Physically safe environment: no spills, clutter or unnecessary equipment. Stretcher in lowest position, wheels locked, appropriate side rails in place. Provide visual cue, wrist band, yellow gown, etc. Monitor gait and stability. Monitor for mental status changes and reorient to person, place, and time. Review medications for side effects contributing to fall risk. Reinforce activity limits and safety measures with patient and family.

## 2023-03-21 NOTE — ED ADULT TRIAGE NOTE - ISOLATION TYPE:
I am refilling 90 days on the lisinopril.   We need to recheck your blood pressure and your lab work (blood work) in 2-3 weeks after you have been back on your medication.    When you come back for labs - if you come fasting can check cholesterol also.     We talked about screening for colon cancer. Colonoscopy is preferred. We also talked about Cologuard a test that is mailed to you at home that screens for colon cancer DNA in the stool.     You are due for a tetanus shot. If you cut yourself you will need a tetanus shot urgently.           
None

## 2023-03-21 NOTE — ED PROVIDER NOTE - CARE PLAN
1 Principal Discharge DX:	SOB (shortness of breath)   Principal Discharge DX:	SOB (shortness of breath)  Secondary Diagnosis:	Pleural effusion, right   Principal Discharge DX:	SOB (shortness of breath)  Secondary Diagnosis:	Pleural effusion, right  Secondary Diagnosis:	Hyperkalemia

## 2023-03-21 NOTE — ED ADULT NURSE NOTE - NSICDXPASTMEDICALHX_GEN_ALL_CORE_FT
PAST MEDICAL HISTORY:  Controlled type 2 diabetes mellitus without complication, without long-term current use of insulin     DM (diabetes mellitus)     Essential hypertension     HLD (hyperlipidemia)     HTN (hypertension)     Hyperlipidemia associated with type 2 diabetes mellitus

## 2023-03-21 NOTE — H&P ADULT - CONVERSATION DETAILS
Broached the subject of code status with the patient. Pt states she has not had prior discussions in regards to a code status; her wishes in regards to CPR or mechanical ventilation. Pt would like to defer her decision in regards to this pending family discussion. She remains full code.

## 2023-03-21 NOTE — H&P ADULT - NSHPPHYSICALEXAM_GEN_ALL_CORE
Vital Signs Last 24 Hrs  T(C): 36.4 (21 Mar 2023 18:42), Max: 36.4 (21 Mar 2023 18:42)  T(F): 97.5 (21 Mar 2023 18:42), Max: 97.5 (21 Mar 2023 18:42)  HR: 67 (21 Mar 2023 21:37) (67 - 75)  BP: 101/47 (21 Mar 2023 21:37) (101/47 - 125/53)  BP(mean): 60 (21 Mar 2023 21:37) (60 - 60)  RR: 35 (21 Mar 2023 21:37) (20 - 35)  SpO2: 95% (21 Mar 2023 21:37) (94% - 98%)    Parameters below as of 21 Mar 2023 21:37  Patient On (Oxygen Delivery Method): nasal cannula  O2 Flow (L/min): 6

## 2023-03-21 NOTE — ED PROVIDER NOTE - PHYSICAL EXAMINATION
Gen: Elevated BMI, arrives on NRB with mild increased WOB (RR in mid 20s), hemodynamically stable   HEENT: No nasal discharge, mucous membranes moist, no oropharyngeal edema/erythema/exudates   CV: RRR, +S1/S2, no M/R/G, equal b/l radial pulses 2+  Resp: Decreased BS in b/l bases, more significant on right with no rales/crackles, SPO2 low 80s on RA; improved to >95% on 8L NRB, RR in mid 20s    GI: Abdomen soft, non-distended, NTTP, no masses/organomegaly, paracentesis site c/d/i   MSK/Skin: Significant b/l symmetric LE pitting edema with no calf TTP, no CVA tenderness, no open wounds, no bruising   Neuro: A&Ox4, moving all 4 extremities spontaneously, gross sensation intact in UE and LE BL  Psych: appropriate mood

## 2023-03-21 NOTE — H&P ADULT - PROBLEM SELECTOR PLAN 3
Creatinine in March 10th during recent hospitalization was 5.44, now improved to 1.86.  - unclear etiology. YAN versus CKD?  - caution with nephrotoxic agents Creatinine in March 10th during recent hospitalization was 5.44, now improved to 1.86.  - unclear etiology. YAN versus CKD?  - strict I/O  - caution with nephrotoxic agents

## 2023-03-21 NOTE — PHYSICAL EXAM
[Chaperone Present] : A chaperone was present in the examining room during all aspects of the physical examination [Normal] : Assessment of Respiratory effort: No increased work of breathing or signs of respiratory distress [Abnormal] : Abdomen: Abnormal [Ambulatory and capable of all self care but unable to carry out any work activities] : Status 2- Ambulatory and capable of all self care but unable to carry out any work activities. Up and about more than 50% of waking hours [FreeTextEntry1] : Bettina GATES [de-identified] : 3+ pedal edema bilaterally, no calf tenderness, neg homans sign [de-identified] : massively distended abdomen, +tympanic, +obese, no TTP, midline vertical incision c/d/i [de-identified] : Of note, patient unable to tolerate pelvic exam after several attempts because cannot get in dorsal lithotomy position due to SOB from massive ascites.

## 2023-03-21 NOTE — HISTORY OF PRESENT ILLNESS
[FreeTextEntry1] : Med Onc / Ref:  Dr. Jones (A)\par PCP:  Dr. Kajal Smalls\par Cards:  Dr. Julian Gage\par \par Pt attends today's visit with her son, Darion. \par \par Ms. Guillory, 77 years old, recently admitted to [facility] on 3/8/23 for abdominal pain and distension; constipation.  Workup included imaging identifying ascites,  carcinomatosis and bilateral complex adnexal masses. \par \par Pt recently saw Dr. Jones last week.  (Our office is working on obtaining those records).  Per patient, lab work was done.  She was advised to follow up with our office to discuss primary cytoreductive surgery versus neoadjuvant chemotherapy.\par \par Denies f/c/n/v/d/vaginal bleeding.  Reports urinary frequency, urgency, constipation, bloating and abdominal pressure. She reports SOB due to her increased abdominal girth. She is passing flatus, last BM two days ago. \par \par VS ED HPI:\par 77 years old female with h/o HTN, HLD, CAD s/p PCI in 2018, DM, obesity present to ED with complain of abdominal distension and pain. Patient reported gradual worsening of abdominal distension and pain for 1 week. Pain is more like generalized abdominal discomfort. Reported constipation and last bowel movement was 4 days ago. Still pass gas. Decrease oral intake due to no appetite. \par Hemodynamically stable, afebrile, sat well at RA. No leukocytosis Hb 8.8, MCV 81.1, plt 505, Cr 3.88, K 5.8, glucose 136, lactate 1.7. CT abd/pelvis noted 7.9x5.2x5.9cm right adnexal mass suspicious for ovarian malignancy. Moderate ascites and probable omental carcinomatosis. Epicardial adenopathy. Prominent retroperitoneal lymph nodes\par \par Paracentesis: \par 3/10/23 = 5200 ml removed (cytology: adenocarcinoma;  consistent for mullerian origin)\par \par Labs:\par 3/9/23:   = 990\par 3/10/23:  LDH = 376\par \par Imaging:\par 3/9/23 CT Chest \par LUNGS AND AIRWAYS: Patent central airways.  Bilateral lower lobe passive \par atelectasis. Bilateral upper lobe mild emphysema.\par PLEURA: Large right pleural effusion and trace left pleural effusion.\par MEDIASTINUM AND RADHIKA: No mediastinal or hilar adenopathy allowing for \par absence of IV contrast. 1.7 cm enlarged epicardial lymph node (6:84).\par VESSELS: Aortic and coronary atherosclerosis.\par HEART: Mild cardiomegaly. No pericardial effusion.\par CHEST WALL AND LOWER NECK: Within normal limits. Anasarca.\par VISUALIZED UPPER ABDOMEN: Ascites. Omental nodularity. 3.9 cm right \par adrenal fatty mass consistent with myelolipoma.\par BONES: Degenerative changes.\par IMPRESSION:\par Large right and small left pleural effusions with underlying passive \par atelectasis.\par Mild emphysema.\par Abnormal epicardial lymph nodes.\par Ascites with omental carcinomatosis.\par Right adrenal myelolipoma.\par \par 3/9/23 TVUS \par Uterus 7.5 x 5.7 x 4.6 cm unremarkable. Endometrium 1.1 cm homogeneous. Normal ovaries are not defined. Multiple complex cystic structures in the pelvis with tsolid and cystic components the largest measuring up to 10.5 \par cm. Findings likely reflect malignant ovarian neoplasm and associated peritoneal metastases.\par Moderate to large amount of abdominal and pelvic ascites.\par IMPRESSION:\par Findings suggestive of peritoneal carcinomatosis consistent with the CT finding, likely secondary to ovarian neoplasm.\par \par 3/8/23 CT A/P\par LOWER CHEST: Moderate to large right pleural effusion with underlying passive atelectasis. Trace left pleural effusion. Enlarged 1.5 cm epicardial lymph node (2:17).\par LIVER: Within normal limits.\par BILE DUCTS: Normal caliber.\par GALLBLADDER: Within normal limits.\par SPLEEN: Within normal limits.\par PANCREAS: Within normal limits.\par ADRENALS: Within normal limits.\par KIDNEYS/URETERS: Within normal limits.\par BLADDER: Minimally distended.\par REPRODUCTIVE ORGANS: Unremarkable uterus. Ill-defined 7.9 x 5.2 x 5.9 cm right adnexal mass suspicious for ovarian malignancy (2:93, 6:72 and 4:82).\par BOWEL: No bowel obstruction. Appendix is not visualized and cannot be assessed\par PERITONEUM: Moderate ascites. Diffuse omental nodularity and soft tissue infiltration consistent with carcinomatosis.\par VESSELS: Within normal limits.\par RETROPERITONEUM/LYMPH NODES: Prominent periaortic lymph nodes. Reference lymph node 1.1 x 1.4 cm (2:54).\par ABDOMINAL WALL: Within normal limits.\par BONES: Thoracolumbar degenerative changes.\par IMPRESSION:\par 7.9 x 5.2 x 5.9 cm right adnexal mass suspicious for ovarian malignancy. \par Limited characterization without IV contrast.\par Moderate ascites and probable omental carcinomatosis.\par Epicardial adenopathy. Prominent retroperitoneal lymph nodes.\par Addendum:\par With the benefit of a follow-up ultrasound on 3/9/2023, there appears to be an 11.7 x 10.1 cystic structure arising from the left ovary (2:110), also suspicious for malignancy.  Additionally, there is a 3.8 cm right adrenal fatty mass (2:35), consistent with myelolipoma.\par \par POB: G4 (2 c-sections; 2 vaginal deliveries) - 3 living children ages 53, 49 and 40;  1 child passed away at age 2 in [facility] - per family child was ill. \par PGYN: Menarche age 17; Menopause age 47.  Denies any PMB\par PMH: HTN, HLD, CAD, DM, obesity\par Meds: amlodipine; 81mg asa; atorvostatin; Imdur; Metoprolol; Novolog; Vitamin D3 \par Allergies: NKDA\par PSH: PCI x 3 (with stents);  sections x 2\par Social Hx: lives alone; non-smoker, no alcohol\par FH: No family history of cancer\par \par Health Maintenance:\par COVID vaccine received:  yes\par Mammogram: per patient,  (facility in [place])\par Colonoscopy:  - per patient normal - next due \par PAP:  No pap in years.  \par

## 2023-03-21 NOTE — H&P ADULT - NSHPLABSRESULTS_GEN_ALL_CORE
LABS:                         8.0    15.25 )-----------( 514      ( 21 Mar 2023 19:09 )             26.1     03-21    140  |  111<H>  |  52<H>  ----------------------------<  295<H>  5.4<H>   |  18<L>  |  1.86<H>    Ca    9.4      21 Mar 2023 21:03    TPro  6.1  /  Alb  2.7<L>  /  TBili  0.4  /  DBili  x   /  AST  20  /  ALT  12  /  AlkPhos  133<H>  03-21    PT/INR - ( 21 Mar 2023 19:09 )   PT: 13.4 sec;   INR: 1.15 ratio         PTT - ( 21 Mar 2023 19:09 )  PTT:25.3 sec            Records reviewed from prior hospitalization.  Labs reviewed remarkable for - leukocytosis to 15. Microcytic anemia at baseline. BUN/Cr of 52/1.86 (previously creatinine was 4.55 in March 10 2023)  EKG personally reviewed - NSR  CXR personally reviewed - large right sided pleural effusion

## 2023-03-21 NOTE — ED PROVIDER NOTE - PROGRESS NOTE DETAILS
Branden Mccray MD. pocus showing large R pleural effusion, small L pleural effusion. hyperdynamic heart, collapsible IVC. pt placed on bipap for resp distress. txing hyperK w/ cocktail.

## 2023-03-21 NOTE — ED ADULT NURSE NOTE - OBJECTIVE STATEMENT
78 y/o F w/ PMH of HTN, DM, kidney disease, hx of stents, newly diagnosed ovarian CA, came via EMS w/ complaints of SOB. Pt was getting paracentesis when her spo2 dropped and because tachypneic. EMS reports that pt' RR was in the 50s. Pt denies chest pain, V/D. Pt's son reports that her legs are more swollen than usual. Pt a/ox4, lung sounds bilaterally diminished in the lower lobes, skin warm dry, s1 s2 sounds present, non tender abd, edema noted in bilaterally extremities, pulses present and strong in all 4 quadrants. 18g placed in L hand.

## 2023-03-21 NOTE — H&P ADULT - PROBLEM SELECTOR PLAN 4
While in the ED, pt developed hypotension while on BiPAP  - pt unsure of home dose of blood pressure medications. Will hold off anyway given development of low blood pressures in the ED  - please clarify home medications this morning ([employer] emailed)

## 2023-03-21 NOTE — H&P ADULT - PROBLEM SELECTOR PLAN 1
Secondary to large right sided pleural effusion.  Pt has no prior history of thoracentesis.   - s/p trail of BiPAP in the ED but discontinued after she developed hypotension while on BiPAP  - maintain on high flow nasal canula   - s/p vanc and cefepime empirically. Can hold off on further abx for now  - obtain TTE  - obtain lower extremity duplex to r/o DVT  - please obtain pulmonology consult this morning for thoracentesis

## 2023-03-21 NOTE — DISCUSSION/SUMMARY
[Reviewed Clinical Lab Test(s)] : Results of clinical tests were reviewed. [Reviewed Radiology Report(s)] : Radiology reports were reviewed. [Reviewed Radiology Film/Image(s)] : Images from radiology studies were reviewed and examined. [Discuss Tests w/Referring Providers] : Results of labs/radiology studies and the treatment recommendations were discussed with performing/referring physician. [Discuss Alternatives/Risks/Benefits w/Patient] : All alternatives, risks, and benefits were discussed with the patient/family and all questions were answered.  Patient expressed good understanding and appreciates the importance of follow up as recommended. [Visit Time ___ Minutes] : [unfilled] minutes [Face to Face Time___ Minutes] : with [unfilled] minutes in face to face consultation.

## 2023-03-21 NOTE — H&P ADULT - ASSESSMENT
77 year old female with a PMHx of HTN, HLD, CAD s/p PCI in 2018, DM2, recent admission and newly diagnosed with ovarian cancer c/b peritoneal carcinomatosis (not on active chemo/radiation) who presents from outpatient office for hypoxia. She was found to have new large-volume pleural effusion on the right, concerning for malignancy pleural effusion given her history. She is currently stabilized on high flow nasal canula, satting about 93%. She requires inpatient monitoring and pulmonology evaluation for thoracentesis.

## 2023-03-21 NOTE — H&P ADULT - PROBLEM SELECTOR PLAN 2
C/b peritoneal carcinomatosis. Hx of two large-volume paracentesis, last one done on 3/21 prior to arrival to the ED (4L, per outpatient notes). Cytopathology from ascites fluid on 3/10/23 was positive for malignant cells.  Pt is not yet on disease-targeted treatment. She plans to move to [place] this weekend to be closer to her son and intends to initiate treatment there  - continue outpatient follow up  - please obtain onc consult this morning

## 2023-03-21 NOTE — ED ADULT NURSE NOTE - HIV OFFER
Previously Negative (within the last year)
CONSTITUTIONAL:  No fever, chills, weakness or fatigue.  HEENT:  No rhinorrhea  SKIN:  No rash or itching.  CARDIOVASCULAR:  +chest pain  RESPIRATORY:  No shortness of breath, cough or sputum.  GASTROINTESTINAL:  No nausea, vomiting or diarrhea. No abdominal pain.   GENITOURINARY:  Denies hematuria, dysuria.   NEUROLOGICAL:  No headache, dizziness, syncope.   MUSCULOSKELETAL:  No muscle, back pain, joint pain or stiffness.  HEMATOLOGIC:  No bleeding or bruising.

## 2023-03-21 NOTE — ED ADULT NURSE REASSESSMENT NOTE - NS ED NURSE REASSESS COMMENT FT1
Report received from Marianne Steiner RN. pt on high flow nasal cannula on prmafit, bed locked in the lowest position.

## 2023-03-21 NOTE — ED PROVIDER NOTE - CLINICAL SUMMARY MEDICAL DECISION MAKING FREE TEXT BOX
76 y/o female with pmhx of HTN, HLD, CAD s/p PCI in 2018 (not on AC), DM, obesity, recent admission and newly diagnosed with ovarian cancer 2 weeks ago (no active chemo/radiation) presenting with shortness of breath x 1 day, paracentesis performed today, Decreased BS in b/l bases, more significant on right with no rales/crackles, SPO2 low 80s on RA; improved to >95% on 8L NRB, RR in mid 20s , hemodynamically stable. Exam/hx c/f but not limited to malignant pleural effusion vs. PE vs. PTX vs. CHF/fluid overload vs. mets to lungs vs. ACS vs. pericardial effusion. Will obtain stat xray, CTA chest, EKG, basic labs with cardiac enzymes/pro-BNP/VBG, and continue to reassess respiratory status; might require BIPAP; stable at this time; pending imaging and reassessment

## 2023-03-21 NOTE — ED PROCEDURE NOTE - PROCEDURE ADDITIONAL DETAILS
Emergency Department Focused Ultrasound performed at patient's bedside for educational purposes. The study will have a follow up study performed or was performed in the direct supervision of an ultrasound trained attending.     f/u ctap chest

## 2023-03-21 NOTE — ED ADULT NURSE REASSESSMENT NOTE - NS ED NURSE REASSESS COMMENT FT1
Pt ordered for oral Lokelma, per MD Walters, okay to take pt off BiPAP and place on nonbreather and administer lokelma

## 2023-03-22 ENCOUNTER — NON-APPOINTMENT (OUTPATIENT)
Age: 78
End: 2023-03-22

## 2023-03-22 LAB
ALBUMIN FLD-MCNC: 1.6 G/DL — SIGNIFICANT CHANGE UP
ALBUMIN SERPL ELPH-MCNC: 3.1 G/DL
ALP BLD-CCNC: 156 U/L
ALT SERPL-CCNC: 13 U/L
ANION GAP SERPL CALC-SCNC: 14 MMOL/L
ANION GAP SERPL CALC-SCNC: 9 MMOL/L — SIGNIFICANT CHANGE UP (ref 5–17)
APTT BLD: 25 SEC — LOW (ref 27.5–35.5)
APTT BLD: 28.3 SEC
AST SERPL-CCNC: 20 U/L
B PERT IGG+IGM PNL SER: ABNORMAL
BASE EXCESS BLDV CALC-SCNC: -4.8 MMOL/L — LOW (ref -2–3)
BASOPHILS # BLD AUTO: 0.04 K/UL — SIGNIFICANT CHANGE UP (ref 0–0.2)
BASOPHILS # BLD AUTO: 0.05 K/UL
BASOPHILS NFR BLD AUTO: 0.3 %
BASOPHILS NFR BLD AUTO: 0.3 % — SIGNIFICANT CHANGE UP (ref 0–2)
BILIRUB SERPL-MCNC: 0.4 MG/DL
BUN SERPL-MCNC: 48 MG/DL — HIGH (ref 7–23)
BUN SERPL-MCNC: 56 MG/DL
CA-I SERPL-SCNC: 1.38 MMOL/L — HIGH (ref 1.15–1.33)
CALCIUM SERPL-MCNC: 9 MG/DL
CALCIUM SERPL-MCNC: 9.2 MG/DL — SIGNIFICANT CHANGE UP (ref 8.4–10.5)
CANCER AG125 SERPL-ACNC: 1144 U/ML
CANCER AG19-9 SERPL-ACNC: 14 U/ML
CEA SERPL-MCNC: 2.1 NG/ML
CHLORIDE BLDV-SCNC: 115 MMOL/L — HIGH (ref 96–108)
CHLORIDE SERPL-SCNC: 108 MMOL/L
CHLORIDE SERPL-SCNC: 111 MMOL/L — HIGH (ref 96–108)
CHOLEST FLD-MCNC: 40 MG/DL — SIGNIFICANT CHANGE UP
CO2 BLDV-SCNC: 22 MMOL/L — SIGNIFICANT CHANGE UP (ref 22–26)
CO2 SERPL-SCNC: 19 MMOL/L
CO2 SERPL-SCNC: 19 MMOL/L — LOW (ref 22–31)
COLOR FLD: SIGNIFICANT CHANGE UP
CREAT SERPL-MCNC: 1.71 MG/DL — HIGH (ref 0.5–1.3)
CREAT SERPL-MCNC: 2.06 MG/DL
EGFR: 25 ML/MIN/1.73M2
EGFR: 31 ML/MIN/1.73M2 — LOW
EOSINOPHIL # BLD AUTO: 0.11 K/UL
EOSINOPHIL # BLD AUTO: 0.24 K/UL — SIGNIFICANT CHANGE UP (ref 0–0.5)
EOSINOPHIL NFR BLD AUTO: 0.7 %
EOSINOPHIL NFR BLD AUTO: 1.7 % — SIGNIFICANT CHANGE UP (ref 0–6)
FLUID INTAKE SUBSTANCE CLASS: SIGNIFICANT CHANGE UP
GAS PNL BLDV: 140 MMOL/L — SIGNIFICANT CHANGE UP (ref 136–145)
GAS PNL BLDV: SIGNIFICANT CHANGE UP
GAS PNL BLDV: SIGNIFICANT CHANGE UP
GLUCOSE BLDC GLUCOMTR-MCNC: 184 MG/DL — HIGH (ref 70–99)
GLUCOSE BLDC GLUCOMTR-MCNC: 189 MG/DL — HIGH (ref 70–99)
GLUCOSE BLDC GLUCOMTR-MCNC: 190 MG/DL — HIGH (ref 70–99)
GLUCOSE BLDC GLUCOMTR-MCNC: 222 MG/DL — HIGH (ref 70–99)
GLUCOSE BLDC GLUCOMTR-MCNC: 233 MG/DL — HIGH (ref 70–99)
GLUCOSE BLDC GLUCOMTR-MCNC: 243 MG/DL — HIGH (ref 70–99)
GLUCOSE BLDC GLUCOMTR-MCNC: 272 MG/DL — HIGH (ref 70–99)
GLUCOSE BLDV-MCNC: 220 MG/DL — HIGH (ref 70–99)
GLUCOSE FLD-MCNC: 175 MG/DL — SIGNIFICANT CHANGE UP
GLUCOSE SERPL-MCNC: 206 MG/DL — HIGH (ref 70–99)
GLUCOSE SERPL-MCNC: 242 MG/DL
GRAM STN FLD: SIGNIFICANT CHANGE UP
HCO3 BLDV-SCNC: 21 MMOL/L — LOW (ref 22–29)
HCT VFR BLD CALC: 26.1 % — LOW (ref 34.5–45)
HCT VFR BLD CALC: 28.4 %
HCT VFR BLDA CALC: 25 % — LOW (ref 34.5–46.5)
HGB BLD CALC-MCNC: 8.3 G/DL — LOW (ref 11.7–16.1)
HGB BLD-MCNC: 7.8 G/DL — LOW (ref 11.5–15.5)
HGB BLD-MCNC: 8.5 G/DL
IMM GRANULOCYTES NFR BLD AUTO: 0.4 %
IMM GRANULOCYTES NFR BLD AUTO: 0.4 % — SIGNIFICANT CHANGE UP (ref 0–0.9)
INR BLD: 1.12 RATIO — SIGNIFICANT CHANGE UP (ref 0.88–1.16)
INR PPP: 1.09 RATIO
LACTATE BLDV-MCNC: 1.4 MMOL/L — SIGNIFICANT CHANGE UP (ref 0.5–2)
LDH SERPL L TO P-CCNC: 524 U/L — SIGNIFICANT CHANGE UP
LYMPHOCYTES # BLD AUTO: 1.17 K/UL
LYMPHOCYTES # BLD AUTO: 1.18 K/UL — SIGNIFICANT CHANGE UP (ref 1–3.3)
LYMPHOCYTES # BLD AUTO: 8.5 % — LOW (ref 13–44)
LYMPHOCYTES # FLD: 5 % — SIGNIFICANT CHANGE UP
LYMPHOCYTES NFR BLD AUTO: 7.3 %
MAGNESIUM SERPL-MCNC: 2.2 MG/DL — SIGNIFICANT CHANGE UP (ref 1.6–2.6)
MAN DIFF?: NORMAL
MCHC RBC-ENTMCNC: 22.8 PG — LOW (ref 27–34)
MCHC RBC-ENTMCNC: 23.3 PG
MCHC RBC-ENTMCNC: 29.9 GM/DL
MCHC RBC-ENTMCNC: 29.9 GM/DL — LOW (ref 32–36)
MCV RBC AUTO: 76.3 FL — LOW (ref 80–100)
MCV RBC AUTO: 77.8 FL
MESOTHL CELL # FLD: 4 % — SIGNIFICANT CHANGE UP
MONOCYTES # BLD AUTO: 0.74 K/UL
MONOCYTES # BLD AUTO: 0.88 K/UL — SIGNIFICANT CHANGE UP (ref 0–0.9)
MONOCYTES NFR BLD AUTO: 4.6 %
MONOCYTES NFR BLD AUTO: 6.3 % — SIGNIFICANT CHANGE UP (ref 2–14)
MONOS+MACROS # FLD: 18 % — SIGNIFICANT CHANGE UP
NEUTROPHILS # BLD AUTO: 11.54 K/UL — HIGH (ref 1.8–7.4)
NEUTROPHILS # BLD AUTO: 13.88 K/UL
NEUTROPHILS NFR BLD AUTO: 82.8 % — HIGH (ref 43–77)
NEUTROPHILS NFR BLD AUTO: 86.7 %
NEUTROPHILS-BODY FLUID: 5 % — SIGNIFICANT CHANGE UP
NIGHT BLUE STAIN TISS: SIGNIFICANT CHANGE UP
NRBC # BLD: 0 /100 WBCS — SIGNIFICANT CHANGE UP (ref 0–0)
OTHER CELLS FLD MANUAL: 68 % — SIGNIFICANT CHANGE UP
PCO2 BLDV: 42 MMHG — SIGNIFICANT CHANGE UP (ref 39–42)
PH BLDV: 7.31 — LOW (ref 7.32–7.43)
PH FLD: 7.47 — SIGNIFICANT CHANGE UP
PHOSPHATE SERPL-MCNC: 4.4 MG/DL — SIGNIFICANT CHANGE UP (ref 2.5–4.5)
PLATELET # BLD AUTO: 519 K/UL — HIGH (ref 150–400)
PLATELET # BLD AUTO: 598 K/UL
PO2 BLDV: 47 MMHG — HIGH (ref 25–45)
POTASSIUM BLDV-SCNC: 5.3 MMOL/L — HIGH (ref 3.5–5.1)
POTASSIUM SERPL-MCNC: 5.2 MMOL/L — SIGNIFICANT CHANGE UP (ref 3.5–5.3)
POTASSIUM SERPL-SCNC: 5.2 MMOL/L — SIGNIFICANT CHANGE UP (ref 3.5–5.3)
POTASSIUM SERPL-SCNC: 6.1 MMOL/L
PROT FLD-MCNC: 3.2 G/DL — SIGNIFICANT CHANGE UP
PROT SERPL-MCNC: 6.7 G/DL
PROTHROM AB SERPL-ACNC: 13 SEC — SIGNIFICANT CHANGE UP (ref 10.5–13.4)
PT BLD: 12.7 SEC
RBC # BLD: 3.42 M/UL — LOW (ref 3.8–5.2)
RBC # BLD: 3.65 M/UL
RBC # FLD: 19.1 % — HIGH (ref 10.3–14.5)
RBC # FLD: 19.4 %
RCV VOL RI: HIGH /UL (ref 0–0)
SAO2 % BLDV: 71.8 % — SIGNIFICANT CHANGE UP (ref 67–88)
SODIUM SERPL-SCNC: 139 MMOL/L — SIGNIFICANT CHANGE UP (ref 135–145)
SODIUM SERPL-SCNC: 141 MMOL/L
SPECIMEN SOURCE: SIGNIFICANT CHANGE UP
SPECIMEN SOURCE: SIGNIFICANT CHANGE UP
TOTAL NUCLEATED CELL COUNT, BODY FLUID: 428 /UL — SIGNIFICANT CHANGE UP
TUBE TYPE: SIGNIFICANT CHANGE UP
WBC # BLD: 13.94 K/UL — HIGH (ref 3.8–10.5)
WBC # FLD AUTO: 13.94 K/UL — HIGH (ref 3.8–10.5)
WBC # FLD AUTO: 16.02 K/UL

## 2023-03-22 PROCEDURE — 88112 CYTOPATH CELL ENHANCE TECH: CPT | Mod: 26,59

## 2023-03-22 PROCEDURE — 93308 TTE F-UP OR LMTD: CPT | Mod: 26,GC

## 2023-03-22 PROCEDURE — 99232 SBSQ HOSP IP/OBS MODERATE 35: CPT

## 2023-03-22 PROCEDURE — 76604 US EXAM CHEST: CPT | Mod: 26,GC

## 2023-03-22 PROCEDURE — 88305 TISSUE EXAM BY PATHOLOGIST: CPT | Mod: 26

## 2023-03-22 PROCEDURE — 71045 X-RAY EXAM CHEST 1 VIEW: CPT | Mod: 26

## 2023-03-22 PROCEDURE — 88108 CYTOPATH CONCENTRATE TECH: CPT | Mod: 26

## 2023-03-22 PROCEDURE — 99223 1ST HOSP IP/OBS HIGH 75: CPT | Mod: GC,25

## 2023-03-22 PROCEDURE — 99233 SBSQ HOSP IP/OBS HIGH 50: CPT

## 2023-03-22 PROCEDURE — 88189 FLOWCYTOMETRY/READ 16 & >: CPT

## 2023-03-22 PROCEDURE — 93306 TTE W/DOPPLER COMPLETE: CPT | Mod: 26

## 2023-03-22 PROCEDURE — 32555 ASPIRATE PLEURA W/ IMAGING: CPT | Mod: GC

## 2023-03-22 RX ORDER — NYSTATIN CREAM 100000 [USP'U]/G
1 CREAM TOPICAL ONCE
Refills: 0 | Status: COMPLETED | OUTPATIENT
Start: 2023-03-22 | End: 2023-03-22

## 2023-03-22 RX ORDER — CHLORHEXIDINE GLUCONATE 213 G/1000ML
1 SOLUTION TOPICAL
Refills: 0 | Status: DISCONTINUED | OUTPATIENT
Start: 2023-03-22 | End: 2023-03-25

## 2023-03-22 RX ADMIN — Medication 2: at 13:51

## 2023-03-22 RX ADMIN — Medication 81 MILLIGRAM(S): at 11:36

## 2023-03-22 RX ADMIN — Medication 2: at 17:38

## 2023-03-22 RX ADMIN — Medication 1: at 08:54

## 2023-03-22 RX ADMIN — CHLORHEXIDINE GLUCONATE 1 APPLICATION(S): 213 SOLUTION TOPICAL at 13:56

## 2023-03-22 RX ADMIN — NYSTATIN CREAM 1 APPLICATION(S): 100000 CREAM TOPICAL at 06:49

## 2023-03-22 NOTE — PROGRESS NOTE ADULT - SUBJECTIVE AND OBJECTIVE BOX
Kala Mccray MD  Division of Hospital Medicine  Please contact via MS Teams (prefer message first)  Office: [phone]    Patient is a 77y old  Female who presents with a chief complaint of hypoxic respiratory failure secondary to large pleural effusion (22 Mar 2023 08:51)      SUBJECTIVE / OVERNIGHT EVENTS:  no acute events overnight, vss, afebrile  pt currently on HFNC and is uncomfortable for her  denies any dyspnea on HFNC, chest pain, palpitation, abdominal pain  states that she will go to [place] this weekend to move in with her 2nd son Miles and continue care in [place]    tele reviewed: sinus 50-70s    ROS:  14 point ROS negative in detail except stated as above    MEDICATIONS  (STANDING):  aspirin enteric coated 81 milliGRAM(s) Oral daily  chlorhexidine 2% Cloths 1 Application(s) Topical <User Schedule>  dextrose 5%. 1000 milliLiter(s) (100 mL/Hr) IV Continuous <Continuous>  dextrose 5%. 1000 milliLiter(s) (50 mL/Hr) IV Continuous <Continuous>  dextrose 50% Injectable 25 Gram(s) IV Push once  dextrose 50% Injectable 12.5 Gram(s) IV Push once  dextrose 50% Injectable 25 Gram(s) IV Push once  glucagon  Injectable 1 milliGRAM(s) IntraMuscular once  insulin lispro (ADMELOG) corrective regimen sliding scale   SubCutaneous three times a day before meals  insulin lispro (ADMELOG) corrective regimen sliding scale   SubCutaneous at bedtime    MEDICATIONS  (PRN):  acetaminophen     Tablet .. 650 milliGRAM(s) Oral every 6 hours PRN Temp greater or equal to 38C (100.4F), Mild Pain (1 - 3)  aluminum hydroxide/magnesium hydroxide/simethicone Suspension 30 milliLiter(s) Oral every 4 hours PRN Dyspepsia  dextrose Oral Gel 15 Gram(s) Oral once PRN Blood Glucose LESS THAN 70 milliGRAM(s)/deciliter  melatonin 3 milliGRAM(s) Oral at bedtime PRN Insomnia  ondansetron Injectable 4 milliGRAM(s) IV Push every 8 hours PRN Nausea and/or Vomiting      CAPILLARY BLOOD GLUCOSE      POCT Blood Glucose.: 189 mg/dL (22 Mar 2023 07:59)  POCT Blood Glucose.: 184 mg/dL (22 Mar 2023 06:41)  POCT Blood Glucose.: 243 mg/dL (22 Mar 2023 00:03)  POCT Blood Glucose.: 272 mg/dL (21 Mar 2023 20:53)  POCT Blood Glucose.: 273 mg/dL (21 Mar 2023 19:53)    I&O's Summary      PHYSICAL EXAM:  Vital Signs Last 24 Hrs  T(C): 36.4 (22 Mar 2023 11:08), Max: 36.9 (22 Mar 2023 04:58)  T(F): 97.5 (22 Mar 2023 11:08), Max: 98.4 (22 Mar 2023 04:58)  HR: 73 (22 Mar 2023 11:08) (56 - 92)  BP: 129/51 (22 Mar 2023 11:08) (101/47 - 135/56)  BP(mean): 66 (21 Mar 2023 23:27) (60 - 66)  RR: 19 (22 Mar 2023 11:08) (19 - 35)  SpO2: 91% (22 Mar 2023 11:08) (91% - 100%)    Parameters below as of 22 Mar 2023 11:08  Patient On (Oxygen Delivery Method): nasal cannula, high flow  O2 Flow (L/min): 50  O2 Concentration (%): 50  GENERAL: NAD, well-developed  HEAD:  Atraumatic, Normocephalic  EYES: EOMI, PERRLA, conjunctiva and sclera clear  NECK: Supple, No JVD  CHEST/LUNG: Clear to auscultation bilaterally; No wheeze  HEART: Regular rate and rhythm; No murmurs, rubs, or gallops  ABDOMEN: distended but soft  EXTREMITIES:  2+ Peripheral Pulses, No clubbing, cyanosis, or edema  NEUROLOGY: AAOx3; non-focal  SKIN: No rashes or lesions    LABS:  personally reviewed                        7.8    13.94 )-----------( 519      ( 22 Mar 2023 06:19 )             26.1     03-22    139  |  111<H>  |  48<H>  ----------------------------<  206<H>  5.2   |  19<L>  |  1.71<H>    Ca    9.2      22 Mar 2023 06:18  Phos  4.4     03-22  Mg     2.2     03-22    TPro  6.1  /  Alb  2.7<L>  /  TBili  0.4  /  DBili  x   /  AST  20  /  ALT  12  /  AlkPhos  133<H>  03-21    PT/INR - ( 22 Mar 2023 06:20 )   PT: 13.0 sec;   INR: 1.12 ratio         PTT - ( 22 Mar 2023 06:20 )  PTT:25.0 sec          RADIOLOGY & ADDITIONAL TESTS:    Imaging Personally Reviewed:  < from: CT Chest No Cont (03.21.23 @ 20:36) >  LUNGS AND AIRWAYS: Patent central airways.  Mild emphysema. Complete   atelectasis of the right middle and lower lobe. Few subpleural nodular   densities in the posterior segment of the left lower lobe were obscured   on prior CT chest due to atelectasis.  PLEURA: Large right pleural effusion, increased in size from prior exam.   No left pleural effusion.  MEDIASTINUM AND RADHIKA: No mediastinal or hilar lymphadenopathy. Unchanged   prominent 1.7 cm epicardial lymph node.  VESSELS: Atherosclerotic calcifications of the aorta and coronary   arteries.  HEART: Heart size is normal. No pericardial effusion.  CHEST WALL AND LOWER NECK: Subcutaneous edema predominantly involving the   right upper back.  VISUALIZED UPPER ABDOMEN: Small volume perihepatic ascites, decreased   from prior exam. Right adrenal myelolipoma.  BONES: Chronic left-sided eighth and ninth rib fractures. Degenerative   changes.    IMPRESSION:  Interval increase in size of a large right pleural effusion with   associated complete atelectasis of the right middle and right lower lobes.    < end of copied text >      Consultant(s) Notes Reviewed:  pulm    Care Discussed with Consultants/Other Providers: Dr. Mata (pulm)

## 2023-03-22 NOTE — CONSULT NOTE ADULT - NSCONSULTADDITIONALINFOA_GEN_ALL_CORE
Gynecologic Oncology Attending Addendum    Agree with note as written above my Resident and Fellow. She is a 78yo w/ complex medical hx recently dx advanced mullerian ca (cytology on ascites at [facility]) presenting after hypoxia noted after outpt paracentesis now s/p thoracentesis for large pleural effusion. Agree w/ thora, send fluid for cytology.  F/u sxs and o2 requirement after thora, if not improving would recommend CTA to r/o PE. Recommend IR guided tissue bx to classify malignancy for tx guidance once pt medically stable. Continue pain control and VTE ppx. Appreciate excellent care by primary team.    Sparkle Brown MD

## 2023-03-22 NOTE — PATIENT PROFILE ADULT - NSPROHMDIABETBLDGLCTST_GEN_A_NUR
Pt states, she only check her blood sugar when she's is not feeling well/before meals and at bedtime

## 2023-03-22 NOTE — CONSULT NOTE ADULT - SUBJECTIVE AND OBJECTIVE BOX
CHIEF COMPLAINT:    HPI:  77 year old female with a PMHx of HTN, HLD, CAD s/p PCI in 2018, DM2, recent admission and newly diagnosed with ovarian cancer c/b peritoneal carcinomatosis (not on active chemo/radiation) who presents from outpatient office for hypoxia. The patient states she has been experiencing gradually worsening dyspnea on exertion and occasionally at rest over the past several weeks. There is no associated cough, fevers, chills, or chest pain. She was recently seen at [facility], diagnosed with metastatic ovarian cancer with peritoneal carcionmatosis and YAN. She had a large-volume paracentesis on 3/10 where 5.2L were removed. Cytopathology was positive for malignant cells. Since discharge, she had a second paracentesis just prior to arrival today where 4L of ascites fluid was removed. Pt states her shortness of breath improved after today's paracentesis; however, she was still found to be hypoxic and was brought to the ED for further evaluation.     ED course: the patient was hypoxic to the 80s on RA, improved to >95% with 8L NRB. She was subsequently placed on BiPAP for respiratory distress -> developed hypotension to the 100s systolic -> given  cc and placed on HFNC. ED Pocus showed large right pleural effusion, small left pleural effusion and collapsible IVC. She was treated with cefepime and vancomycin empirically. Hypokalemia to 6.1, given insulin, dextrose, calcium gluconate, and lokelma. (21 Mar 2023 22:02)    since admission patient now tolerating HFNC. patient never febrile although has leukocytosis.     PAST MEDICAL & SURGICAL HISTORY:  HTN (hypertension)      DM (diabetes mellitus)      HLD (hyperlipidemia)      Essential hypertension      Controlled type 2 diabetes mellitus without complication, without long-term current use of insulin      Hyperlipidemia associated with type 2 diabetes mellitus      H/O:       No significant past surgical history          FAMILY HISTORY:      SOCIAL HISTORY:  Smoking: [ ] Never Smoked [ ] Former Smoker (__ packs x ___ years) [ ] Current Smoker  (__ packs x ___ years)  Substance Use: [ ] Never Used [ ] Used ____  EtOH Use:  Marital Status: [ ] Single [ ]  [ ]  [ ]   Sexual History:   Occupation:  Recent Travel:  Country of Birth:  Advance Directives:    Allergies    No Known Allergies    Intolerances        HOME MEDICATIONS:  Home Medications:  amLODIPine 10 mg oral tablet: 1 tab(s) orally once a day (09 Mar 2023 16:25)  aspirin 81 mg oral delayed release tablet: 1 tab(s) orally once a day (09 Mar 2023 16:25)  Imdur 60 mg oral tablet, extended release: 1 tab(s) orally once a day (in the morning) (09 Mar 2023 16:25)  metoprolol succinate 200 mg oral capsule, extended release: 1 cap(s) orally once a day (09 Mar 2023 16:25)  NovoLOG 100 units/mL injectable solution: 10 unit(s) injectable 3 times a day (09 Mar 2023 16:25)  polyethylene glycol 3350 oral powder for reconstitution: 17 gram(s) orally once a day (10 Mar 2023 13:36)  senna leaf extract oral tablet: 2 tab(s) orally once a day (at bedtime) (10 Mar 2023 13:36)  Vitamin D3 1250 mcg (50,000 intl units) oral capsule: 1 cap(s) orally once a week (09 Mar 2023 16:25)      REVIEW OF SYSTEMS:  Constitutional: [ ] negative [ ] fevers [ ] chills [ ] weight loss [ ] weight gain  HEENT: [ ] negative [ ] dry eyes [ ] eye irritation [ ] postnasal drip [ ] nasal congestion  CV: [ ] negative  [ ] chest pain [ ] orthopnea [ ] palpitations [ ] murmur  Resp: [ ] negative [ ] cough [ ] shortness of breath [ ] dyspnea [ ] wheezing [ ] sputum [ ] hemoptysis  GI: [ ] negative [ ] nausea [ ] vomiting [ ] diarrhea [ ] constipation [ ] abd pain [ ] dysphagia   : [ ] negative [ ] dysuria [ ] nocturia [ ] hematuria [ ] increased urinary frequency  Musculoskeletal: [ ] negative [ ] back pain [ ] myalgias [ ] arthralgias [ ] fracture  Skin: [ ] negative [ ] rash [ ] itch  Neurological: [ ] negative [ ] headache [ ] dizziness [ ] syncope [ ] weakness [ ] numbness  Psychiatric: [ ] negative [ ] anxiety [ ] depression  Endocrine: [ ] negative [ ] diabetes [ ] thyroid problem  Hematologic/Lymphatic: [ ] negative [ ] anemia [ ] bleeding problem  Allergic/Immunologic: [ ] negative [ ] itchy eyes [ ] nasal discharge [ ] hives [ ] angioedema  [ ] All other systems negative  [ ] Unable to assess ROS because ________    OBJECTIVE:  ICU Vital Signs Last 24 Hrs  T(C): 36.9 (22 Mar 2023 04:58), Max: 36.9 (22 Mar 2023 04:58)  T(F): 98.4 (22 Mar 2023 04:58), Max: 98.4 (22 Mar 2023 04:58)  HR: 92 (22 Mar 2023 04:58) (63 - 92)  BP: 107/60 (22 Mar 2023 04:58) (101/47 - 135/56)  BP(mean): 66 (21 Mar 2023 23:27) (60 - 66)  ABP: --  ABP(mean): --  RR: 20 (22 Mar 2023 04:58) (20 - 35)  SpO2: 97% (22 Mar 2023 04:58) (93% - 100%)    O2 Parameters below as of 22 Mar 2023 04:58  Patient On (Oxygen Delivery Method): nasal cannula, high flow  O2 Flow (L/min): 50  O2 Concentration (%): 50          CAPILLARY BLOOD GLUCOSE      POCT Blood Glucose.: 189 mg/dL (22 Mar 2023 07:59)      PHYSICAL EXAM:  General: awake and alert, nontoxic appearing *** lying in bed  HEENT: NC/AT, EOMI b/l, conjunctiva normal, MMM  Lymph Nodes: no cervical LAD  Neck: supple. full range of motion  Respiratory: CTA b/l, no w/r/c, appears comfortable on ***, no conversational dyspnea or accessory muscle use  Cardiovascular: S1 S2 present, RRR, no m/r/g  Abdomen: soft, NT/ND, +BS  Extremities: no c/c/e  Skin: no rashes or lesions noted  Neurological: AAOx3, no focal deficits  Psychiatry: calm, cooperative    LINES:     HOSPITAL MEDICATIONS:  Standing Meds:  aspirin enteric coated 81 milliGRAM(s) Oral daily  dextrose 5%. 1000 milliLiter(s) IV Continuous <Continuous>  dextrose 5%. 1000 milliLiter(s) IV Continuous <Continuous>  dextrose 50% Injectable 25 Gram(s) IV Push once  dextrose 50% Injectable 12.5 Gram(s) IV Push once  dextrose 50% Injectable 25 Gram(s) IV Push once  glucagon  Injectable 1 milliGRAM(s) IntraMuscular once  insulin lispro (ADMELOG) corrective regimen sliding scale   SubCutaneous three times a day before meals  insulin lispro (ADMELOG) corrective regimen sliding scale   SubCutaneous at bedtime      PRN Meds:  acetaminophen     Tablet .. 650 milliGRAM(s) Oral every 6 hours PRN  aluminum hydroxide/magnesium hydroxide/simethicone Suspension 30 milliLiter(s) Oral every 4 hours PRN  dextrose Oral Gel 15 Gram(s) Oral once PRN  melatonin 3 milliGRAM(s) Oral at bedtime PRN  ondansetron Injectable 4 milliGRAM(s) IV Push every 8 hours PRN      LABS:                        7.8    13.94 )-----------( 519      ( 22 Mar 2023 06:19 )             26.1     Hgb Trend: 7.8<--, 8.0<--      139  |  111<H>  |  48<H>  ----------------------------<  206<H>  5.2   |  19<L>  |  1.71<H>    Ca    9.2      22 Mar 2023 06:18  Phos  4.4       Mg     2.2         TPro  6.1  /  Alb  2.7<L>  /  TBili  0.4  /  DBili  x   /  AST  20  /  ALT  12  /  AlkPhos  133<H>      Creatinine Trend: 1.71<--, 1.86<--, 1.92<--, 4.55<--, 4.65<--, 4.40<--  PT/INR - ( 22 Mar 2023 06:20 )   PT: 13.0 sec;   INR: 1.12 ratio         PTT - ( 22 Mar 2023 06:20 )  PTT:25.0 sec      Venous Blood Gas:   @ 06:23  7.31/42/47/21/71.8  VBG Lactate: 1.4  Venous Blood Gas:   @ 18:52  7.30/41/39/20/58.3  VBG Lactate: 1.3      MICROBIOLOGY:       RADIOLOGY:  [ ] Reviewed and interpreted by me    PULMONARY FUNCTION TESTS:    EKG:

## 2023-03-22 NOTE — PROGRESS NOTE ADULT - ASSESSMENT
77 year old female with a PMHx of HTN, HLD, CAD s/p PCI in 2018, DM2, recent admission and newly diagnosed with ovarian cancer c/b peritoneal carcinomatosis (not on active chemo/radiation) who presents from outpatient office for hypoxia. She was found to have new large-volume pleural effusion on the right, concerning for malignancy pleural effusion pending thoracentesis +/- pleurX

## 2023-03-22 NOTE — CHART NOTE - NSCHARTNOTEFT_GEN_A_CORE
: Dr. Luu, Dr. Mata    INDICATION: POCUS    PROCEDURE:  [ ] LIMITED ECHO  [x] LIMITED CHEST  [ ] LIMITED RETROPERITONEAL  [ ] LIMITED ABDOMINAL  [ ] LIMITED DVT  [ ] NEEDLE GUIDANCE VASCULAR  [ ] NEEDLE GUIDANCE THORACENTESIS  [ ] NEEDLE GUIDANCE PARACENTESIS  [ ] NEEDLE GUIDANCE PERICARDIOCENTESIS  [ ] OTHER    FINDINGS:  a lines in left lung, no pleff, right lung with large pleural effusion and atelectatic lung    INTERPRETATION:  safe pocket for thora, has post thora lung sliding      Images uploaded to [employer]

## 2023-03-22 NOTE — PATIENT PROFILE ADULT - BILL PAYMENT
Drinks a lot of sugar free iced tea (a 2 quart pitcher) and is active at work (still works as a psych nurse) but still has issues with constipation. She takes over the counter women's laxative, docusate, and prn Linzess (prescribed from her prior PCP). Her constipation is better controlled when she's on a dose of synthroid 100 mcg. Doesn't take any fiber supplements. Eats a lot of apples. Has lettuce with 2 meals a day. Since her  has been upgraded from a thickened liquids diet since his hospitalization he's been binging a lot and they have been eating out every night for dinner.    no

## 2023-03-22 NOTE — CONSULT NOTE ADULT - ATTENDING COMMENTS
Pt is a 77F with PMHx HTN/HLD, DMII, and recently dz'ed ovarian cancer (Stage IV with peritoneal carcinomatosis and ascites, not yet initiated on treatment) presenting to [facility] on 3/21/23 with acute hypoxemic respiratory failure requiring HFNC O2 supplementation in the setting of large unilateral pleural effusion. Plan for diagnostic and therapeutic thoracentesis today. Will monitor for symptom improvement and if tolerates HFNC weaning post fluid removal. If symptomatically improved, will need to have further discussions regarding possible benefit of IPC placement.

## 2023-03-22 NOTE — PATIENT PROFILE ADULT - FALL HARM RISK - HARM RISK INTERVENTIONS
Assistance OOB with selected safe patient handling equipment/Communicate Risk of Fall with Harm to all staff/Monitor for mental status changes/Monitor gait and stability/Reinforce activity limits and safety measures with patient and family/Reorient to person, place and time as needed/Review medications for side effects contributing to fall risk/Sit up slowly, dangle for a short time, stand at bedside before walking/Tailored Fall Risk Interventions/Use of alarms - bed, chair and/or voice tab/Visual Cue: Yellow wristband and red socks/Bed in lowest position, wheels locked, appropriate side rails in place/Call bell, personal items and telephone in reach/Instruct patient to call for assistance before getting out of bed or chair/Non-slip footwear when patient is out of bed/[place] to call system/Physically safe environment - no spills, clutter or unnecessary equipment/Purposeful Proactive Rounding/Room/bathroom lighting operational, light cord in reach

## 2023-03-22 NOTE — CONSULT NOTE ADULT - REASON FOR ADMISSION
hypoxic respiratory failure secondary to large pleural effusion
hypoxic respiratory failure secondary to large pleural effusion

## 2023-03-22 NOTE — CONSULT NOTE ADULT - ASSESSMENT
77 year old female with a PMHx of HTN, HLD, CAD s/p PCI in 2018, DM2, recent admission and newly diagnosed with ovarian cancer c/b peritoneal carcinomatosis (not on active chemo/radiation) who presents from outpatient office for hypoxia despite large volume paracentesis performed as outpatient with     CT chest with mild emphysema, complete atelectasis of the right middle and lower lobe. few subpleural nodular densities in the posterior segment of the left lower lobe Large right pleural effusion, increased in size from prior exam. No left pleural effusion. No mediastinal or hilar lymphadenopathy. Unchanged prominent 1.7 cm epicardial lymph node.Small volume perihepatic ascites, decreased from prior exam. Chronic left-sided eighth and ninth rib fractures. Degenerative changes.    #recommendations  likely malignant in nature, and also related to abdominal ascites however given persistent hypoxemia and respiratory distress despite paracentesis will perform thoracentesis later today  cannot rule out PE in patient although has YAN so please obtain bilateral DVT studies of lower extremities  hold off on AC until after procedure  titrate oxygen to O2 >88%, use humidified oxygen  incentive spirometry, OOB to chair, PT/OT  aspiration precautions as necessary  DVT ppx as tolerated  airway clearance as necessary    
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 Presentation also concerning for electrolyte derangements and YAN. Creatinine today improved to 1.71 from earlier this month.     Pt known well to Gyn Onc service, seen by Dr. Julian Hodges yesterday.    - Appreciate excellent mgmt by medical team  - Continue to monitor s/sx following thoracocentesis, low threshold to consider CTA if SOB does not improve or if O2 requirements unable to be weaned following thoracocenteis. Given imporvement of YAN, would consider benefit vs risk of study if needed  - Recommend IR guided tissue biopsy on admission for confirmatory diagnosis of disease    Please call Gyn Onc with any questions  ([facility] [id number])  Pt sherman Lopez, Gyn Onc Fellow  [facility] PGY4

## 2023-03-23 LAB
ANION GAP SERPL CALC-SCNC: 8 MMOL/L — SIGNIFICANT CHANGE UP (ref 5–17)
APTT BLD: 27.3 SEC — LOW (ref 27.5–35.5)
BASOPHILS # BLD AUTO: 0.03 K/UL — SIGNIFICANT CHANGE UP (ref 0–0.2)
BASOPHILS NFR BLD AUTO: 0.3 % — SIGNIFICANT CHANGE UP (ref 0–2)
BUN SERPL-MCNC: 40 MG/DL — HIGH (ref 7–23)
CALCIUM SERPL-MCNC: 8.9 MG/DL — SIGNIFICANT CHANGE UP (ref 8.4–10.5)
CHLORIDE SERPL-SCNC: 112 MMOL/L — HIGH (ref 96–108)
CO2 SERPL-SCNC: 22 MMOL/L — SIGNIFICANT CHANGE UP (ref 22–31)
COMMENT - FLUIDS: SIGNIFICANT CHANGE UP
CREAT SERPL-MCNC: 1.45 MG/DL — HIGH (ref 0.5–1.3)
EGFR: 38 ML/MIN/1.73M2 — LOW
EOSINOPHIL # BLD AUTO: 0.32 K/UL — SIGNIFICANT CHANGE UP (ref 0–0.5)
EOSINOPHIL NFR BLD AUTO: 3.4 % — SIGNIFICANT CHANGE UP (ref 0–6)
GLUCOSE BLDC GLUCOMTR-MCNC: 141 MG/DL — HIGH (ref 70–99)
GLUCOSE BLDC GLUCOMTR-MCNC: 240 MG/DL — HIGH (ref 70–99)
GLUCOSE BLDC GLUCOMTR-MCNC: 276 MG/DL — HIGH (ref 70–99)
GLUCOSE BLDC GLUCOMTR-MCNC: 283 MG/DL — HIGH (ref 70–99)
GLUCOSE SERPL-MCNC: 134 MG/DL — HIGH (ref 70–99)
HCT VFR BLD CALC: 25.6 % — LOW (ref 34.5–45)
HGB BLD-MCNC: 7.8 G/DL — LOW (ref 11.5–15.5)
IMM GRANULOCYTES NFR BLD AUTO: 1.3 % — HIGH (ref 0–0.9)
LYMPHOCYTES # BLD AUTO: 1.11 K/UL — SIGNIFICANT CHANGE UP (ref 1–3.3)
LYMPHOCYTES # BLD AUTO: 11.7 % — LOW (ref 13–44)
MCHC RBC-ENTMCNC: 23.1 PG — LOW (ref 27–34)
MCHC RBC-ENTMCNC: 30.5 GM/DL — LOW (ref 32–36)
MCV RBC AUTO: 76 FL — LOW (ref 80–100)
MONOCYTES # BLD AUTO: 0.6 K/UL — SIGNIFICANT CHANGE UP (ref 0–0.9)
MONOCYTES NFR BLD AUTO: 6.3 % — SIGNIFICANT CHANGE UP (ref 2–14)
MRSA PCR RESULT.: SIGNIFICANT CHANGE UP
NEUTROPHILS # BLD AUTO: 7.31 K/UL — SIGNIFICANT CHANGE UP (ref 1.8–7.4)
NEUTROPHILS NFR BLD AUTO: 77 % — SIGNIFICANT CHANGE UP (ref 43–77)
NRBC # BLD: 0 /100 WBCS — SIGNIFICANT CHANGE UP (ref 0–0)
PLATELET # BLD AUTO: 502 K/UL — HIGH (ref 150–400)
POTASSIUM SERPL-MCNC: 4.8 MMOL/L — SIGNIFICANT CHANGE UP (ref 3.5–5.3)
POTASSIUM SERPL-SCNC: 4.8 MMOL/L — SIGNIFICANT CHANGE UP (ref 3.5–5.3)
RBC # BLD: 3.37 M/UL — LOW (ref 3.8–5.2)
RBC # FLD: 19.4 % — HIGH (ref 10.3–14.5)
S AUREUS DNA NOSE QL NAA+PROBE: DETECTED
SARS-COV-2 RNA SPEC QL NAA+PROBE: SIGNIFICANT CHANGE UP
SODIUM SERPL-SCNC: 142 MMOL/L — SIGNIFICANT CHANGE UP (ref 135–145)
TM INTERPRETATION: SIGNIFICANT CHANGE UP
WBC # BLD: 9.49 K/UL — SIGNIFICANT CHANGE UP (ref 3.8–10.5)
WBC # FLD AUTO: 9.49 K/UL — SIGNIFICANT CHANGE UP (ref 3.8–10.5)

## 2023-03-23 PROCEDURE — 71045 X-RAY EXAM CHEST 1 VIEW: CPT | Mod: 26

## 2023-03-23 PROCEDURE — 99233 SBSQ HOSP IP/OBS HIGH 50: CPT

## 2023-03-23 PROCEDURE — 93970 EXTREMITY STUDY: CPT | Mod: 26

## 2023-03-23 PROCEDURE — 99233 SBSQ HOSP IP/OBS HIGH 50: CPT | Mod: GC

## 2023-03-23 RX ORDER — HEPARIN SODIUM 5000 [USP'U]/ML
INJECTION INTRAVENOUS; SUBCUTANEOUS
Qty: 25000 | Refills: 0 | Status: DISCONTINUED | OUTPATIENT
Start: 2023-03-23 | End: 2023-03-24

## 2023-03-23 RX ORDER — HEPARIN SODIUM 5000 [USP'U]/ML
7500 INJECTION INTRAVENOUS; SUBCUTANEOUS EVERY 6 HOURS
Refills: 0 | Status: DISCONTINUED | OUTPATIENT
Start: 2023-03-23 | End: 2023-03-24

## 2023-03-23 RX ORDER — HEPARIN SODIUM 5000 [USP'U]/ML
3500 INJECTION INTRAVENOUS; SUBCUTANEOUS EVERY 6 HOURS
Refills: 0 | Status: DISCONTINUED | OUTPATIENT
Start: 2023-03-23 | End: 2023-03-24

## 2023-03-23 RX ORDER — SENNA PLUS 8.6 MG/1
2 TABLET ORAL AT BEDTIME
Refills: 0 | Status: DISCONTINUED | OUTPATIENT
Start: 2023-03-23 | End: 2023-03-25

## 2023-03-23 RX ORDER — POLYETHYLENE GLYCOL 3350 17 G/17G
17 POWDER, FOR SOLUTION ORAL DAILY
Refills: 0 | Status: DISCONTINUED | OUTPATIENT
Start: 2023-03-23 | End: 2023-03-25

## 2023-03-23 RX ORDER — HEPARIN SODIUM 5000 [USP'U]/ML
5000 INJECTION INTRAVENOUS; SUBCUTANEOUS EVERY 8 HOURS
Refills: 0 | Status: DISCONTINUED | OUTPATIENT
Start: 2023-03-23 | End: 2023-03-23

## 2023-03-23 RX ORDER — ENOXAPARIN SODIUM 100 MG/ML
90 INJECTION SUBCUTANEOUS EVERY 12 HOURS
Refills: 0 | Status: DISCONTINUED | OUTPATIENT
Start: 2023-03-23 | End: 2023-03-23

## 2023-03-23 RX ADMIN — Medication 2: at 11:39

## 2023-03-23 RX ADMIN — HEPARIN SODIUM 5000 UNIT(S): 5000 INJECTION INTRAVENOUS; SUBCUTANEOUS at 13:40

## 2023-03-23 RX ADMIN — HEPARIN SODIUM 1700 UNIT(S)/HR: 5000 INJECTION INTRAVENOUS; SUBCUTANEOUS at 18:35

## 2023-03-23 RX ADMIN — HEPARIN SODIUM 1700 UNIT(S)/HR: 5000 INJECTION INTRAVENOUS; SUBCUTANEOUS at 19:10

## 2023-03-23 RX ADMIN — SENNA PLUS 2 TABLET(S): 8.6 TABLET ORAL at 21:32

## 2023-03-23 RX ADMIN — POLYETHYLENE GLYCOL 3350 17 GRAM(S): 17 POWDER, FOR SOLUTION ORAL at 11:39

## 2023-03-23 RX ADMIN — Medication 81 MILLIGRAM(S): at 11:39

## 2023-03-23 RX ADMIN — Medication 1: at 21:32

## 2023-03-23 RX ADMIN — Medication 3: at 17:14

## 2023-03-23 RX ADMIN — CHLORHEXIDINE GLUCONATE 1 APPLICATION(S): 213 SOLUTION TOPICAL at 05:30

## 2023-03-23 NOTE — CHART NOTE - NSCHARTNOTEFT_GEN_A_CORE
Patient with positive DVT of the R soleal vein confined to the calf on LE duplex.   D/w Dr. Mccray - will start therapeutic lovenox.       < from: [facility] Duplex Lower Ext Vein Scan, Brittanie (03.23.23 @ 16:20) >    IMPRESSION:  Acute below the knee DVT confined to the soleal vein of the right calf.    < end of copied text > Patient with positive DVT of the R soleal vein confined to the calf on LE duplex.   D/w Dr. Mccray - will start heparin gtt.    < from: [facility] Duplex Lower Ext Vein Scan, Brittanie (03.23.23 @ 16:20) >    IMPRESSION:  Acute below the knee DVT confined to the soleal vein of the right calf.    < end of copied text >

## 2023-03-23 NOTE — PROGRESS NOTE ADULT - SUBJECTIVE AND OBJECTIVE BOX
CHIEF COMPLAINT:Patient is a 77y old  Female who presents with a chief complaint of hypoxic respiratory failure secondary to large pleural effusion (22 Mar 2023 16:00)      Interval Events:    REVIEW OF SYSTEMS:  [x] All other systems negative except per HPI   [ ] Unable to assess ROS because ________    OBJECTIVE:  ICU Vital Signs Last 24 Hrs  T(C): 36.5 (23 Mar 2023 08:33), Max: 36.6 (23 Mar 2023 04:00)  T(F): 97.7 (23 Mar 2023 08:33), Max: 97.8 (23 Mar 2023 04:00)  HR: 106 (23 Mar 2023 08:33) (56 - 106)  BP: 113/74 (23 Mar 2023 08:33) (111/54 - 133/69)  BP(mean): --  ABP: --  ABP(mean): --  RR: 18 (23 Mar 2023 08:33) (16 - 19)  SpO2: 96% (23 Mar 2023 08:52) (91% - 100%)    O2 Parameters below as of 23 Mar 2023 08:52  Patient On (Oxygen Delivery Method): nasal cannula  O2 Flow (L/min): 3            03-22 @ 07:01  -  03-23 @ 07:00  --------------------------------------------------------  IN: 240 mL / OUT: 550 mL / NET: -310 mL        PHYSICAL EXAM:  GENERAL: NAD, well-groomed, well-developed  HEAD:  Atraumatic, Normocephalic  EYES: EOMI, PERRLA, conjunctiva and sclera clear  ENMT: No tonsillar erythema, exudates, or enlargement; Moist mucous membranes, Good dentition, No lesions  NECK: Supple, No JVD, Normal thyroid  CHEST/LUNG: Clear to auscultation bilaterally; No rales, rhonchi, wheezing, or rubs  HEART: Regular rate and rhythm; No murmurs, rubs, or gallops  ABDOMEN: Soft, Nontender, Nondistended; Bowel sounds present  VASCULAR:  2+ Peripheral Pulses, No clubbing, cyanosis, or edema  LYMPH: No lymphadenopathy noted  SKIN: No rashes or lesions  NERVOUS SYSTEM:  Alert & Oriented X3, Good concentration; Motor Strength 5/5 B/L upper and lower extremities; DTRs 2+ intact and symmetric    HOSPITAL MEDICATIONS:  MEDICATIONS  (STANDING):  aspirin enteric coated 81 milliGRAM(s) Oral daily  chlorhexidine 2% Cloths 1 Application(s) Topical <User Schedule>  dextrose 5%. 1000 milliLiter(s) (100 mL/Hr) IV Continuous <Continuous>  dextrose 5%. 1000 milliLiter(s) (50 mL/Hr) IV Continuous <Continuous>  dextrose 50% Injectable 25 Gram(s) IV Push once  dextrose 50% Injectable 12.5 Gram(s) IV Push once  dextrose 50% Injectable 25 Gram(s) IV Push once  glucagon  Injectable 1 milliGRAM(s) IntraMuscular once  heparin   Injectable 5000 Unit(s) SubCutaneous every 8 hours  insulin lispro (ADMELOG) corrective regimen sliding scale   SubCutaneous three times a day before meals  insulin lispro (ADMELOG) corrective regimen sliding scale   SubCutaneous at bedtime    MEDICATIONS  (PRN):  acetaminophen     Tablet .. 650 milliGRAM(s) Oral every 6 hours PRN Temp greater or equal to 38C (100.4F), Mild Pain (1 - 3)  aluminum hydroxide/magnesium hydroxide/simethicone Suspension 30 milliLiter(s) Oral every 4 hours PRN Dyspepsia  dextrose Oral Gel 15 Gram(s) Oral once PRN Blood Glucose LESS THAN 70 milliGRAM(s)/deciliter  melatonin 3 milliGRAM(s) Oral at bedtime PRN Insomnia  ondansetron Injectable 4 milliGRAM(s) IV Push every 8 hours PRN Nausea and/or Vomiting      LABS:    The Labs were reviewed by me   The Radiology was reviewed by me    EKG tracing reviewed by me    03-23    142  |  112<H>  |  40<H>  ----------------------------<  134<H>  4.8   |  22  |  1.45<H>  03-22    139  |  111<H>  |  48<H>  ----------------------------<  206<H>  5.2   |  19<L>  |  1.71<H>  03-21    140  |  111<H>  |  52<H>  ----------------------------<  295<H>  5.4<H>   |  18<L>  |  1.86<H>    Ca    8.9      23 Mar 2023 06:31  Ca    9.2      22 Mar 2023 06:18  Ca    9.4      21 Mar 2023 21:03  Phos  4.4     03-22  Mg     2.2     03-22    TPro  6.1  /  Alb  2.7<L>  /  TBili  0.4  /  DBili  x   /  AST  20  /  ALT  12  /  AlkPhos  133<H>  03-21    Magnesium, Serum: 2.2 mg/dL (03-22-23 @ 06:18)    Phosphorus Level, Serum: 4.4 mg/dL (03-22-23 @ 06:18)      PT/INR - ( 22 Mar 2023 06:20 )   PT: 13.0 sec;   INR: 1.12 ratio         PTT - ( 22 Mar 2023 06:20 )  PTT:25.0 sec                                        7.8    9.49  )-----------( 502      ( 23 Mar 2023 06:31 )             25.6                         7.8    13.94 )-----------( 519      ( 22 Mar 2023 06:19 )             26.1                         8.0    15.25 )-----------( 514      ( 21 Mar 2023 19:09 )             26.1     CAPILLARY BLOOD GLUCOSE      POCT Blood Glucose.: 141 mg/dL (23 Mar 2023 07:35)  POCT Blood Glucose.: 190 mg/dL (22 Mar 2023 21:38)  POCT Blood Glucose.: 233 mg/dL (22 Mar 2023 17:18)  POCT Blood Glucose.: 222 mg/dL (22 Mar 2023 13:39)    Blood Gas Source Venous: Venous (03-22-23 @ 06:23)      MICROBIOLOGY:     RADIOLOGY:  [ ] Reviewed and interpreted by me    Point of Care Ultrasound Findings:    PFT:    EKG: CHIEF COMPLAINT:Patient is a 77y old  Female who presents with a chief complaint of hypoxic respiratory failure secondary to large pleural effusion (22 Mar 2023 16:00)      Interval Events: patient feels breathing is much improved post thoracentesis. minimal cough.     REVIEW OF SYSTEMS: Patient denies fevers, chills, chest pain,  nausea, abdominal pain, diarrhea, constipation, dysuria,headache, light headedness  [x] All other systems negative except per HPI   [ ] Unable to assess ROS because ________    OBJECTIVE:  ICU Vital Signs Last 24 Hrs  T(C): 36.5 (23 Mar 2023 08:33), Max: 36.6 (23 Mar 2023 04:00)  T(F): 97.7 (23 Mar 2023 08:33), Max: 97.8 (23 Mar 2023 04:00)  HR: 106 (23 Mar 2023 08:33) (56 - 106)  BP: 113/74 (23 Mar 2023 08:33) (111/54 - 133/69)  BP(mean): --  ABP: --  ABP(mean): --  RR: 18 (23 Mar 2023 08:33) (16 - 19)  SpO2: 96% (23 Mar 2023 08:52) (91% - 100%)    O2 Parameters below as of 23 Mar 2023 08:52  Patient On (Oxygen Delivery Method): nasal cannula  O2 Flow (L/min): 3            03-22 @ 07:01  -  03-23 @ 07:00  --------------------------------------------------------  IN: 240 mL / OUT: 550 mL / NET: -310 mL        PHYSICAL EXAM:  GENERAL: NAD, well-groomed, well-developed  HEAD:  Atraumatic, Normocephalic  EYES: EOMI, PERRLA, conjunctiva and sclera clear  ENMT: No tonsillar erythema, exudates, or enlargement; Moist mucous membranes, Good dentition, No lesions  NECK: Supple, No JVD, Normal thyroid  CHEST/LUNG: still decreased breath sounds RLL,  HEART: Regular rate and rhythm; No murmurs, rubs, or gallops  ABDOMEN: Soft, Nontender, Nondistended; Bowel sounds present  VASCULAR:  2+ Peripheral Pulses, No clubbing, cyanosis. bilateral pitting edema  LYMPH: No lymphadenopathy noted  SKIN: No rashes or lesions  NERVOUS SYSTEM:  Alert & Oriented X3, Good concentration; Motor Strength 5/5 B/L upper and lower extremities; DTRs 2+ intact and symmetric    HOSPITAL MEDICATIONS:  MEDICATIONS  (STANDING):  aspirin enteric coated 81 milliGRAM(s) Oral daily  chlorhexidine 2% Cloths 1 Application(s) Topical <User Schedule>  dextrose 5%. 1000 milliLiter(s) (100 mL/Hr) IV Continuous <Continuous>  dextrose 5%. 1000 milliLiter(s) (50 mL/Hr) IV Continuous <Continuous>  dextrose 50% Injectable 25 Gram(s) IV Push once  dextrose 50% Injectable 12.5 Gram(s) IV Push once  dextrose 50% Injectable 25 Gram(s) IV Push once  glucagon  Injectable 1 milliGRAM(s) IntraMuscular once  heparin   Injectable 5000 Unit(s) SubCutaneous every 8 hours  insulin lispro (ADMELOG) corrective regimen sliding scale   SubCutaneous three times a day before meals  insulin lispro (ADMELOG) corrective regimen sliding scale   SubCutaneous at bedtime    MEDICATIONS  (PRN):  acetaminophen     Tablet .. 650 milliGRAM(s) Oral every 6 hours PRN Temp greater or equal to 38C (100.4F), Mild Pain (1 - 3)  aluminum hydroxide/magnesium hydroxide/simethicone Suspension 30 milliLiter(s) Oral every 4 hours PRN Dyspepsia  dextrose Oral Gel 15 Gram(s) Oral once PRN Blood Glucose LESS THAN 70 milliGRAM(s)/deciliter  melatonin 3 milliGRAM(s) Oral at bedtime PRN Insomnia  ondansetron Injectable 4 milliGRAM(s) IV Push every 8 hours PRN Nausea and/or Vomiting      LABS:    The Labs were reviewed by me   The Radiology was reviewed by me    EKG tracing reviewed by me    03-23    142  |  112<H>  |  40<H>  ----------------------------<  134<H>  4.8   |  22  |  1.45<H>  03-22    139  |  111<H>  |  48<H>  ----------------------------<  206<H>  5.2   |  19<L>  |  1.71<H>  03-21    140  |  111<H>  |  52<H>  ----------------------------<  295<H>  5.4<H>   |  18<L>  |  1.86<H>    Ca    8.9      23 Mar 2023 06:31  Ca    9.2      22 Mar 2023 06:18  Ca    9.4      21 Mar 2023 21:03  Phos  4.4     03-22  Mg     2.2     03-22    TPro  6.1  /  Alb  2.7<L>  /  TBili  0.4  /  DBili  x   /  AST  20  /  ALT  12  /  AlkPhos  133<H>  03-21    Magnesium, Serum: 2.2 mg/dL (03-22-23 @ 06:18)    Phosphorus Level, Serum: 4.4 mg/dL (03-22-23 @ 06:18)      PT/INR - ( 22 Mar 2023 06:20 )   PT: 13.0 sec;   INR: 1.12 ratio         PTT - ( 22 Mar 2023 06:20 )  PTT:25.0 sec                                        7.8    9.49  )-----------( 502      ( 23 Mar 2023 06:31 )             25.6                         7.8    13.94 )-----------( 519      ( 22 Mar 2023 06:19 )             26.1                         8.0    15.25 )-----------( 514      ( 21 Mar 2023 19:09 )             26.1     CAPILLARY BLOOD GLUCOSE      POCT Blood Glucose.: 141 mg/dL (23 Mar 2023 07:35)  POCT Blood Glucose.: 190 mg/dL (22 Mar 2023 21:38)  POCT Blood Glucose.: 233 mg/dL (22 Mar 2023 17:18)  POCT Blood Glucose.: 222 mg/dL (22 Mar 2023 13:39)    Blood Gas Source Venous: Venous (03-22-23 @ 06:23)      MICROBIOLOGY:     RADIOLOGY:  [ ] Reviewed and interpreted by me    Point of Care Ultrasound Findings:    PFT:    EKG:

## 2023-03-23 NOTE — PROGRESS NOTE ADULT - ASSESSMENT
77 year old female with a PMHx of HTN, HLD, CAD s/p PCI in 2018, DM2, recent admission and newly diagnosed with ovarian cancer c/b peritoneal carcinomatosis (not on active chemo/radiation) who presents from outpatient office for hypoxia. She was found to have new large-volume pleural effusion on the right, concerning for malignancy pleural effusion s/p diagnostic/therapeutic thoracentesis

## 2023-03-23 NOTE — PROGRESS NOTE ADULT - SUBJECTIVE AND OBJECTIVE BOX
Kala Mccray MD  Division of Hospital Medicine  Please contact via MS Teams (prefer message first)  Office: [phone]    Patient is a 77y old  Female who presents with a chief complaint of hypoxic respiratory failure secondary to large pleural effusion (23 Mar 2023 08:54)      SUBJECTIVE / OVERNIGHT EVENTS:  no acute events overnight, vss, afebrile  pt now transitioned to NC 3L, breathing comfortably sitting in chair  pt reports much improvement in dyspnea  no BM for the past few days    tele reviewed: sinus 50-70s    ROS:  14 point ROS negative in detail except stated as above    MEDICATIONS  (STANDING):  aspirin enteric coated 81 milliGRAM(s) Oral daily  chlorhexidine 2% Cloths 1 Application(s) Topical <User Schedule>  dextrose 5%. 1000 milliLiter(s) (100 mL/Hr) IV Continuous <Continuous>  dextrose 5%. 1000 milliLiter(s) (50 mL/Hr) IV Continuous <Continuous>  dextrose 50% Injectable 25 Gram(s) IV Push once  dextrose 50% Injectable 12.5 Gram(s) IV Push once  dextrose 50% Injectable 25 Gram(s) IV Push once  glucagon  Injectable 1 milliGRAM(s) IntraMuscular once  heparin   Injectable 5000 Unit(s) SubCutaneous every 8 hours  insulin lispro (ADMELOG) corrective regimen sliding scale   SubCutaneous three times a day before meals  insulin lispro (ADMELOG) corrective regimen sliding scale   SubCutaneous at bedtime    MEDICATIONS  (PRN):  acetaminophen     Tablet .. 650 milliGRAM(s) Oral every 6 hours PRN Temp greater or equal to 38C (100.4F), Mild Pain (1 - 3)  aluminum hydroxide/magnesium hydroxide/simethicone Suspension 30 milliLiter(s) Oral every 4 hours PRN Dyspepsia  dextrose Oral Gel 15 Gram(s) Oral once PRN Blood Glucose LESS THAN 70 milliGRAM(s)/deciliter  melatonin 3 milliGRAM(s) Oral at bedtime PRN Insomnia  ondansetron Injectable 4 milliGRAM(s) IV Push every 8 hours PRN Nausea and/or Vomiting      CAPILLARY BLOOD GLUCOSE      POCT Blood Glucose.: 141 mg/dL (23 Mar 2023 07:35)  POCT Blood Glucose.: 190 mg/dL (22 Mar 2023 21:38)  POCT Blood Glucose.: 233 mg/dL (22 Mar 2023 17:18)  POCT Blood Glucose.: 222 mg/dL (22 Mar 2023 13:39)    I&O's Summary    22 Mar 2023 07:01  -  23 Mar 2023 07:00  --------------------------------------------------------  IN: 240 mL / OUT: 550 mL / NET: -310 mL        PHYSICAL EXAM:  Vital Signs Last 24 Hrs  T(C): 36.5 (23 Mar 2023 08:33), Max: 36.6 (23 Mar 2023 04:00)  T(F): 97.7 (23 Mar 2023 08:33), Max: 97.8 (23 Mar 2023 04:00)  HR: 106 (23 Mar 2023 08:33) (64 - 106)  BP: 113/74 (23 Mar 2023 08:33) (111/54 - 133/69)  BP(mean): --  RR: 18 (23 Mar 2023 08:33) (16 - 19)  SpO2: 93% (23 Mar 2023 09:23) (91% - 100%)    Parameters below as of 23 Mar 2023 09:23  Patient On (Oxygen Delivery Method): nasal cannula  O2 Flow (L/min): 2    GENERAL: NAD, well-developed  HEAD:  Atraumatic, Normocephalic  EYES: EOMI, PERRLA, conjunctiva and sclera clear  NECK: Supple, No JVD  CHEST/LUNG: decreased BS at the R base  HEART: Regular rate and rhythm; No murmurs, rubs, or gallops  ABDOMEN: distended but soft  EXTREMITIES:  2+ Peripheral Pulses, No clubbing, cyanosis, or edema  NEUROLOGY: AAOx3; non-focal  SKIN: No rashes or lesions    LABS:  personally reviewed                        7.8    9.49  )-----------( 502      ( 23 Mar 2023 06:31 )             25.6     03-23    142  |  112<H>  |  40<H>  ----------------------------<  134<H>  4.8   |  22  |  1.45<H>    Ca    8.9      23 Mar 2023 06:31  Phos  4.4     03-22  Mg     2.2     03-22    TPro  6.1  /  Alb  2.7<L>  /  TBili  0.4  /  DBili  x   /  AST  20  /  ALT  12  /  AlkPhos  133<H>  03-21    PT/INR - ( 22 Mar 2023 06:20 )   PT: 13.0 sec;   INR: 1.12 ratio         PTT - ( 22 Mar 2023 06:20 )  PTT:25.0 sec          RADIOLOGY & ADDITIONAL TESTS:    Imaging Personally Reviewed:  < from: Xray Chest 1 View- PORTABLE-Urgent (Xray Chest 1 View- PORTABLE-Urgent .) (03.22.23 @ 14:20) >  The heart size is poorly assessed on this projection.  Decreased right pleural effusion. Trace left pleural effusion.  No pneumothorax.    IMPRESSION:  Decreased right pleural effusion. There is nopneumothorax.    < end of copied text >    < from: TTE with Doppler (w/Cont) (03.22.23 @ 09:25) >  1. Normal mitral valve. Mild mitral regurgitation.  2. Normal left atrium.  LA volume index = 25 cc/m2.  3. Normal left ventricular internal dimensions and wall  thicknesses.  4. Endocardial visualization enhanced with intravenous  injection of Ultrasonic Enhancing Agent (Lumason).  Normal  left ventricular systolic function. Visually, LVEF is  55-60%. Please note that most imaging of the left ventricle  was off-axis hence, subtle delineation of wall motion is  difficult.  5. Normal diastolic function.  6. Normal right atrium.  7. The right ventricle is not well visualized.  8. Normal tricuspid valve. Mild tricuspid regurgitation.  9. Estimated pulmonary artery systolic pressure equals 58  mm Hg, assuming right atrial pressure equals 3mm Hg,  consistent with moderate pulmonary pressures.  10. No pericardial effusion seen.  11. Right pleural effusion.  *** Compared with echocardiogram of 4/13/2018, there is no  longer wall motion abnormalities notes.    Consultant(s) Notes Reviewed:  pulm    Care Discussed with Consultants/Other Providers: Dr. Mata (pulm)

## 2023-03-23 NOTE — PROGRESS NOTE ADULT - ASSESSMENT
77 year old female with a PMHx of HTN, HLD, CAD s/p PCI in 2018, DM2, recent admission and newly diagnosed with ovarian cancer c/b peritoneal carcinomatosis (not on active chemo/radiation) who presents from outpatient office for hypoxia despite large volume paracentesis performed as outpatient with     CT chest with mild emphysema, complete atelectasis of the right middle and lower lobe. few subpleural nodular densities in the posterior segment of the left lower lobe Large right pleural effusion, increased in size from prior exam. No left pleural effusion. No mediastinal or hilar lymphadenopathy. Unchanged prominent 1.7 cm epicardial lymph node.Small volume perihepatic ascites, decreased from prior exam. Chronic left-sided eighth and ninth rib fractures. Degenerative changes.    #recommendations  likely malignant in nature, and also related to abdominal ascites however given persistent hypoxemia and respiratory distress despite paracentesis s/p serosang 1.8 L thoracentesis  exudative non infectious  post CXR with persistent however decreased pleural effusion without PTX  persistence in pleural likely secondary to fluid translocation from ascites  will evaluate for pleurx  cannot rule out PE in patient although has YAN so please obtain bilateral DVT studies of lower extremities  titrate oxygen to O2 >88%, use humidified oxygen  incentive spirometry, OOB to chair, PT/OT  aspiration precautions as necessary  DVT ppx as tolerated  airway clearance as necessary     77 year old female with a PMHx of HTN, HLD, CAD s/p PCI in 2018, DM2, recent admission and newly diagnosed with ovarian cancer c/b peritoneal carcinomatosis (not on active chemo/radiation) who presents from outpatient office for hypoxia despite large volume paracentesis performed as outpatient with     CT chest with mild emphysema, complete atelectasis of the right middle and lower lobe. few subpleural nodular densities in the posterior segment of the left lower lobe Large right pleural effusion, increased in size from prior exam. No left pleural effusion. No mediastinal or hilar lymphadenopathy. Unchanged prominent 1.7 cm epicardial lymph node.Small volume perihepatic ascites, decreased from prior exam. Chronic left-sided eighth and ninth rib fractures. Degenerative changes.    #recommendations  likely malignant in nature, and also related to abdominal ascites however given persistent hypoxemia and respiratory distress despite paracentesis s/p serosang 1.8 L thoracentesis  exudative non infectious: malignant cells predominant  post CXR with persistent however decreased pleural effusion without PTX  persistence in pleural likely secondary to fluid translocation from ascites  will evaluate for pleurx  cannot rule out PE in patient although has YAN so please obtain bilateral DVT studies of lower extremities: low threshold for CTA if persistent hypoxemia  titrate oxygen to O2 >88%, use humidified oxygen  incentive spirometry, OOB to chair, PT/OT  aspiration precautions as necessary  DVT ppx as tolerated  airway clearance as necessary     77 year old female with a PMHx of HTN, HLD, CAD s/p PCI in 2018, DM2, recent admission and newly diagnosed with ovarian cancer c/b peritoneal carcinomatosis (not on active chemo/radiation) who presents from outpatient office for hypoxia despite large volume paracentesis performed as outpatient with     CT chest with mild emphysema, complete atelectasis of the right middle and lower lobe. few subpleural nodular densities in the posterior segment of the left lower lobe Large right pleural effusion, increased in size from prior exam. No left pleural effusion. No mediastinal or hilar lymphadenopathy. Unchanged prominent 1.7 cm epicardial lymph node.Small volume perihepatic ascites, decreased from prior exam. Chronic left-sided eighth and ninth rib fractures. Degenerative changes.    #recommendations  likely malignant in nature, and also related to abdominal ascites however given persistent hypoxemia and respiratory distress despite paracentesis s/p serosang 1.8 L thoracentesis  exudative non infectious: malignant cells predominant  post CXR with persistent however decreased pleural effusion without PTX  persistence in pleural likely secondary to fluid translocation from ascites  will evaluate for pleurx  cannot rule out PE in patient although has YAN so please obtain bilateral DVT studies of lower extremities: low threshold for CTA if persistent hypoxemia  patient currently scheduled for pleurx placement tomorrow at 3pm however there are some issues with obtaining insurance coverage so may not be able to be done  at this time obtain COVID PCR, keep patient NPO at MN, hold DVT ppx tonight, adjust insulin as necessary, early morning CBC, CMP mag Phos, coags, type and screen,  titrate oxygen to O2 >88%, use humidified oxygen  incentive spirometry, OOB to chair, PT/OT  aspiration precautions as necessary  DVT ppx as tolerated  airway clearance as necessary

## 2023-03-24 DIAGNOSIS — I82.409 ACUTE EMBOLISM AND THROMBOSIS OF UNSPECIFIED DEEP VEINS OF UNSPECIFIED LOWER EXTREMITY: ICD-10-CM

## 2023-03-24 LAB
ANION GAP SERPL CALC-SCNC: 8 MMOL/L — SIGNIFICANT CHANGE UP (ref 5–17)
APTT BLD: 110.1 SEC — HIGH (ref 27.5–35.5)
APTT BLD: 77.3 SEC — HIGH (ref 27.5–35.5)
BLD GP AB SCN SERPL QL: NEGATIVE — SIGNIFICANT CHANGE UP
BUN SERPL-MCNC: 28 MG/DL — HIGH (ref 7–23)
CALCIUM SERPL-MCNC: 8.9 MG/DL — SIGNIFICANT CHANGE UP (ref 8.4–10.5)
CHLORIDE SERPL-SCNC: 108 MMOL/L — SIGNIFICANT CHANGE UP (ref 96–108)
CO2 SERPL-SCNC: 22 MMOL/L — SIGNIFICANT CHANGE UP (ref 22–31)
CREAT SERPL-MCNC: 1.29 MG/DL — SIGNIFICANT CHANGE UP (ref 0.5–1.3)
EGFR: 43 ML/MIN/1.73M2 — LOW
GLUCOSE BLDC GLUCOMTR-MCNC: 163 MG/DL — HIGH (ref 70–99)
GLUCOSE BLDC GLUCOMTR-MCNC: 187 MG/DL — HIGH (ref 70–99)
GLUCOSE BLDC GLUCOMTR-MCNC: 199 MG/DL — HIGH (ref 70–99)
GLUCOSE BLDC GLUCOMTR-MCNC: 199 MG/DL — HIGH (ref 70–99)
GLUCOSE BLDC GLUCOMTR-MCNC: 211 MG/DL — HIGH (ref 70–99)
GLUCOSE BLDC GLUCOMTR-MCNC: 239 MG/DL — HIGH (ref 70–99)
GLUCOSE SERPL-MCNC: 179 MG/DL — HIGH (ref 70–99)
HCT VFR BLD CALC: 26.2 % — LOW (ref 34.5–45)
HCT VFR BLD CALC: 28.6 % — LOW (ref 34.5–45)
HGB BLD-MCNC: 8.1 G/DL — LOW (ref 11.5–15.5)
HGB BLD-MCNC: 9.1 G/DL — LOW (ref 11.5–15.5)
INR BLD: 1.21 RATIO — HIGH (ref 0.88–1.16)
MAGNESIUM SERPL-MCNC: 2 MG/DL — SIGNIFICANT CHANGE UP (ref 1.6–2.6)
MCHC RBC-ENTMCNC: 23.2 PG — LOW (ref 27–34)
MCHC RBC-ENTMCNC: 23.9 PG — LOW (ref 27–34)
MCHC RBC-ENTMCNC: 30.9 GM/DL — LOW (ref 32–36)
MCHC RBC-ENTMCNC: 31.8 GM/DL — LOW (ref 32–36)
MCV RBC AUTO: 75.1 FL — LOW (ref 80–100)
MCV RBC AUTO: 75.1 FL — LOW (ref 80–100)
NRBC # BLD: 0 /100 WBCS — SIGNIFICANT CHANGE UP (ref 0–0)
NRBC # BLD: 0 /100 WBCS — SIGNIFICANT CHANGE UP (ref 0–0)
PHOSPHATE SERPL-MCNC: 2.9 MG/DL — SIGNIFICANT CHANGE UP (ref 2.5–4.5)
PLATELET # BLD AUTO: 484 K/UL — HIGH (ref 150–400)
PLATELET # BLD AUTO: 565 K/UL — HIGH (ref 150–400)
POTASSIUM SERPL-MCNC: 4.6 MMOL/L — SIGNIFICANT CHANGE UP (ref 3.5–5.3)
POTASSIUM SERPL-SCNC: 4.6 MMOL/L — SIGNIFICANT CHANGE UP (ref 3.5–5.3)
PROTHROM AB SERPL-ACNC: 14.1 SEC — HIGH (ref 10.5–13.4)
RBC # BLD: 3.49 M/UL — LOW (ref 3.8–5.2)
RBC # BLD: 3.81 M/UL — SIGNIFICANT CHANGE UP (ref 3.8–5.2)
RBC # FLD: 19.3 % — HIGH (ref 10.3–14.5)
RBC # FLD: 19.9 % — HIGH (ref 10.3–14.5)
RH IG SCN BLD-IMP: NEGATIVE — SIGNIFICANT CHANGE UP
SODIUM SERPL-SCNC: 138 MMOL/L — SIGNIFICANT CHANGE UP (ref 135–145)
WBC # BLD: 12.92 K/UL — HIGH (ref 3.8–10.5)
WBC # BLD: 9.78 K/UL — SIGNIFICANT CHANGE UP (ref 3.8–10.5)
WBC # FLD AUTO: 12.92 K/UL — HIGH (ref 3.8–10.5)
WBC # FLD AUTO: 9.78 K/UL — SIGNIFICANT CHANGE UP (ref 3.8–10.5)

## 2023-03-24 PROCEDURE — 99233 SBSQ HOSP IP/OBS HIGH 50: CPT

## 2023-03-24 RX ORDER — ENOXAPARIN SODIUM 100 MG/ML
90 INJECTION SUBCUTANEOUS EVERY 12 HOURS
Refills: 0 | Status: DISCONTINUED | OUTPATIENT
Start: 2023-03-24 | End: 2023-03-24

## 2023-03-24 RX ORDER — APIXABAN 2.5 MG/1
10 TABLET, FILM COATED ORAL
Refills: 0 | Status: DISCONTINUED | OUTPATIENT
Start: 2023-03-24 | End: 2023-03-25

## 2023-03-24 RX ORDER — APIXABAN 2.5 MG/1
2 TABLET, FILM COATED ORAL
Qty: 120 | Refills: 0
Start: 2023-03-24 | End: 2023-04-22

## 2023-03-24 RX ADMIN — HEPARIN SODIUM 1500 UNIT(S)/HR: 5000 INJECTION INTRAVENOUS; SUBCUTANEOUS at 07:20

## 2023-03-24 RX ADMIN — Medication 1: at 11:46

## 2023-03-24 RX ADMIN — HEPARIN SODIUM 1700 UNIT(S)/HR: 5000 INJECTION INTRAVENOUS; SUBCUTANEOUS at 07:18

## 2023-03-24 RX ADMIN — Medication 81 MILLIGRAM(S): at 11:16

## 2023-03-24 RX ADMIN — Medication 1: at 07:57

## 2023-03-24 RX ADMIN — APIXABAN 10 MILLIGRAM(S): 2.5 TABLET, FILM COATED ORAL at 11:54

## 2023-03-24 RX ADMIN — HEPARIN SODIUM 1700 UNIT(S)/HR: 5000 INJECTION INTRAVENOUS; SUBCUTANEOUS at 01:58

## 2023-03-24 RX ADMIN — HEPARIN SODIUM 1700 UNIT(S)/HR: 5000 INJECTION INTRAVENOUS; SUBCUTANEOUS at 05:56

## 2023-03-24 RX ADMIN — CHLORHEXIDINE GLUCONATE 1 APPLICATION(S): 213 SOLUTION TOPICAL at 05:56

## 2023-03-24 RX ADMIN — POLYETHYLENE GLYCOL 3350 17 GRAM(S): 17 POWDER, FOR SOLUTION ORAL at 11:16

## 2023-03-24 RX ADMIN — APIXABAN 10 MILLIGRAM(S): 2.5 TABLET, FILM COATED ORAL at 18:17

## 2023-03-24 RX ADMIN — Medication 2: at 18:11

## 2023-03-24 NOTE — PROGRESS NOTE ADULT - TIME BILLING
Review of patient records, including history, laboratory data, and imaging. Patient evaluation and assessment. Coordination of care.
Review of patient records, including history, laboratory data, and imaging. Patient evaluation and assessment. Coordination of care.

## 2023-03-24 NOTE — PROGRESS NOTE ADULT - SUBJECTIVE AND OBJECTIVE BOX
Kala Mccray MD  Division of Hospital Medicine  Please contact via MS Teams (prefer message first)  Office: [phone]    Patient is a 77y old  Female who presents with a chief complaint of hypoxic respiratory failure secondary to large pleural effusion (24 Mar 2023 11:05)      SUBJECTIVE / OVERNIGHT EVENTS:  no acute events overnight, vss, afebrile  pt currently on RA breathing comfortably  pt feels well but still no BM yet    tele reviewed: sinus 60-70s    ROS:  14 point ROS negative in detail except stated as above    MEDICATIONS  (STANDING):  apixaban 10 milliGRAM(s) Oral two times a day  aspirin enteric coated 81 milliGRAM(s) Oral daily  chlorhexidine 2% Cloths 1 Application(s) Topical <User Schedule>  dextrose 5%. 1000 milliLiter(s) (100 mL/Hr) IV Continuous <Continuous>  dextrose 5%. 1000 milliLiter(s) (50 mL/Hr) IV Continuous <Continuous>  dextrose 50% Injectable 25 Gram(s) IV Push once  dextrose 50% Injectable 12.5 Gram(s) IV Push once  dextrose 50% Injectable 25 Gram(s) IV Push once  glucagon  Injectable 1 milliGRAM(s) IntraMuscular once  insulin lispro (ADMELOG) corrective regimen sliding scale   SubCutaneous three times a day before meals  insulin lispro (ADMELOG) corrective regimen sliding scale   SubCutaneous at bedtime  polyethylene glycol 3350 17 Gram(s) Oral daily  senna 2 Tablet(s) Oral at bedtime    MEDICATIONS  (PRN):  acetaminophen     Tablet .. 650 milliGRAM(s) Oral every 6 hours PRN Temp greater or equal to 38C (100.4F), Mild Pain (1 - 3)  aluminum hydroxide/magnesium hydroxide/simethicone Suspension 30 milliLiter(s) Oral every 4 hours PRN Dyspepsia  dextrose Oral Gel 15 Gram(s) Oral once PRN Blood Glucose LESS THAN 70 milliGRAM(s)/deciliter  melatonin 3 milliGRAM(s) Oral at bedtime PRN Insomnia  ondansetron Injectable 4 milliGRAM(s) IV Push every 8 hours PRN Nausea and/or Vomiting      CAPILLARY BLOOD GLUCOSE      POCT Blood Glucose.: 199 mg/dL (24 Mar 2023 07:55)  POCT Blood Glucose.: 187 mg/dL (24 Mar 2023 06:17)  POCT Blood Glucose.: 199 mg/dL (24 Mar 2023 00:22)  POCT Blood Glucose.: 283 mg/dL (23 Mar 2023 21:23)  POCT Blood Glucose.: 276 mg/dL (23 Mar 2023 16:58)    I&O's Summary    23 Mar 2023 07:01  -  24 Mar 2023 07:00  --------------------------------------------------------  IN: 480 mL / OUT: 800 mL / NET: -320 mL        PHYSICAL EXAM:  Vital Signs Last 24 Hrs  T(C): 36.9 (24 Mar 2023 11:01), Max: 36.9 (24 Mar 2023 11:01)  T(F): 98.5 (24 Mar 2023 11:01), Max: 98.5 (24 Mar 2023 11:01)  HR: 82 (24 Mar 2023 11:01) (70 - 83)  BP: 115/61 (24 Mar 2023 11:01) (101/66 - 115/61)  BP(mean): --  RR: 18 (24 Mar 2023 11:01) (17 - 18)  SpO2: 96% (24 Mar 2023 11:01) (94% - 97%)    Parameters below as of 24 Mar 2023 11:01  Patient On (Oxygen Delivery Method): nasal cannula  O2 Flow (L/min): 1    GENERAL: NAD, well-developed  HEAD:  Atraumatic, Normocephalic  EYES: EOMI, PERRLA, conjunctiva and sclera clear  NECK: Supple, No JVD  CHEST/LUNG: Clear to auscultation bilaterally; No wheeze  HEART: Regular rate and rhythm; No murmurs, rubs, or gallops  ABDOMEN: (+) distended  EXTREMITIES:  2+ Peripheral Pulses, No clubbing, cyanosis, or edema  NEUROLOGY: AAOx3; non-focal  SKIN: No rashes or lesions    LABS:  personally reviewed                        8.1    9.78  )-----------( 484      ( 24 Mar 2023 06:38 )             26.2     03-24    138  |  108  |  28<H>  ----------------------------<  179<H>  4.6   |  22  |  1.29    Ca    8.9      24 Mar 2023 06:38  Phos  2.9     03-24  Mg     2.0     03-24      PT/INR - ( 24 Mar 2023 06:38 )   PT: 14.1 sec;   INR: 1.21 ratio         PTT - ( 24 Mar 2023 06:38 )  PTT:110.1 sec          RADIOLOGY & ADDITIONAL TESTS:    Imaging Personally Reviewed:  < from: [facility] Duplex Lower Ext Vein Scan, Bilat (03.23.23 @ 16:20) >  Acute below the knee DVT confined to the soleal vein of the right calf.    < end of copied text >      Consultant(s) Notes Reviewed:  pulm    Care Discussed with Consultants/Other Providers: Dr. Mata (pulm)

## 2023-03-24 NOTE — PROGRESS NOTE ADULT - SUBJECTIVE AND OBJECTIVE BOX
CHIEF COMPLAINT:Patient is a 77y old  Female who presents with a chief complaint of hypoxic respiratory failure secondary to large pleural effusion (23 Mar 2023 10:20)      Interval Events: breathing improved after removal now on room air able to ambulate much better, no cough, hungry    REVIEW OF SYSTEMS: Patient denies fevers, chills, chest pain, shortness of breath, nausea, abdominal pain, diarrhea, constipation, dysuria, leg swelling, headache, light headedness  [x] All other systems negative except per HPI   [ ] Unable to assess ROS because ________    OBJECTIVE:  ICU Vital Signs Last 24 Hrs  T(C): 36.4 (24 Mar 2023 04:00), Max: 36.7 (23 Mar 2023 11:18)  T(F): 97.6 (24 Mar 2023 04:00), Max: 98 (23 Mar 2023 11:18)  HR: 70 (24 Mar 2023 04:00) (65 - 83)  BP: 109/63 (24 Mar 2023 04:00) (101/66 - 113/56)  BP(mean): --  ABP: --  ABP(mean): --  RR: 17 (24 Mar 2023 06:58) (17 - 18)  SpO2: 94% (24 Mar 2023 06:58) (93% - 97%)    O2 Parameters below as of 24 Mar 2023 06:58  Patient On (Oxygen Delivery Method): nasal cannula  O2 Flow (L/min): 1            03-23 @ 07:01  -  03-24 @ 07:00  --------------------------------------------------------  IN: 480 mL / OUT: 800 mL / NET: -320 mL        PHYSICAL EXAM:  GENERAL: NAD, well-groomed, well-developed  HEAD:  Atraumatic, Normocephalic  EYES: EOMI, PERRLA, conjunctiva and sclera clear  ENMT: No tonsillar erythema, exudates, or enlargement; Moist mucous membranes, Good dentition, No lesions  NECK: Supple, No JVD, Normal thyroid  CHEST/LUNG: decreased right basilar lung sounds,   HEART: Regular rate and rhythm; No murmurs, rubs, or gallops  ABDOMEN: Soft, Nontender, minimally distended; Bowel sounds present  VASCULAR:  2+ Peripheral Pulses, No clubbing, cyanosis. has bilateral pitting edema   LYMPH: No lymphadenopathy noted  SKIN: No rashes or lesions  NERVOUS SYSTEM:  Alert & Oriented X3, Good concentration; Motor Strength 5/5 B/L upper and lower extremities; DTRs 2+ intact and symmetric    HOSPITAL MEDICATIONS:  MEDICATIONS  (STANDING):  apixaban 10 milliGRAM(s) Oral two times a day  aspirin enteric coated 81 milliGRAM(s) Oral daily  chlorhexidine 2% Cloths 1 Application(s) Topical <User Schedule>  dextrose 5%. 1000 milliLiter(s) (100 mL/Hr) IV Continuous <Continuous>  dextrose 5%. 1000 milliLiter(s) (50 mL/Hr) IV Continuous <Continuous>  dextrose 50% Injectable 25 Gram(s) IV Push once  dextrose 50% Injectable 12.5 Gram(s) IV Push once  dextrose 50% Injectable 25 Gram(s) IV Push once  glucagon  Injectable 1 milliGRAM(s) IntraMuscular once  insulin lispro (ADMELOG) corrective regimen sliding scale   SubCutaneous three times a day before meals  insulin lispro (ADMELOG) corrective regimen sliding scale   SubCutaneous at bedtime  polyethylene glycol 3350 17 Gram(s) Oral daily  senna 2 Tablet(s) Oral at bedtime    MEDICATIONS  (PRN):  acetaminophen     Tablet .. 650 milliGRAM(s) Oral every 6 hours PRN Temp greater or equal to 38C (100.4F), Mild Pain (1 - 3)  aluminum hydroxide/magnesium hydroxide/simethicone Suspension 30 milliLiter(s) Oral every 4 hours PRN Dyspepsia  dextrose Oral Gel 15 Gram(s) Oral once PRN Blood Glucose LESS THAN 70 milliGRAM(s)/deciliter  melatonin 3 milliGRAM(s) Oral at bedtime PRN Insomnia  ondansetron Injectable 4 milliGRAM(s) IV Push every 8 hours PRN Nausea and/or Vomiting      LABS:    The Labs were reviewed by me   The Radiology was reviewed by me    EKG tracing reviewed by me    03-24    138  |  108  |  28<H>  ----------------------------<  179<H>  4.6   |  22  |  1.29  03-23    142  |  112<H>  |  40<H>  ----------------------------<  134<H>  4.8   |  22  |  1.45<H>  03-22    139  |  111<H>  |  48<H>  ----------------------------<  206<H>  5.2   |  19<L>  |  1.71<H>    Ca    8.9      24 Mar 2023 06:38  Ca    8.9      23 Mar 2023 06:31  Ca    9.2      22 Mar 2023 06:18  Phos  2.9     03-24  Mg     2.0     03-24    TPro  6.1  /  Alb  2.7<L>  /  TBili  0.4  /  DBili  x   /  AST  20  /  ALT  12  /  AlkPhos  133<H>  03-21    Magnesium, Serum: 2.0 mg/dL (03-24-23 @ 06:38)  Magnesium, Serum: 2.2 mg/dL (03-22-23 @ 06:18)    Phosphorus Level, Serum: 2.9 mg/dL (03-24-23 @ 06:38)  Phosphorus Level, Serum: 4.4 mg/dL (03-22-23 @ 06:18)      PT/INR - ( 24 Mar 2023 06:38 )   PT: 14.1 sec;   INR: 1.21 ratio         PTT - ( 24 Mar 2023 06:38 )  PTT:110.1 sec                                        8.1    9.78  )-----------( 484      ( 24 Mar 2023 06:38 )             26.2                         9.1    12.92 )-----------( 565      ( 24 Mar 2023 00:44 )             28.6                         7.8    9.49  )-----------( 502      ( 23 Mar 2023 06:31 )             25.6     CAPILLARY BLOOD GLUCOSE      POCT Blood Glucose.: 199 mg/dL (24 Mar 2023 07:55)  POCT Blood Glucose.: 187 mg/dL (24 Mar 2023 06:17)  POCT Blood Glucose.: 199 mg/dL (24 Mar 2023 00:22)  POCT Blood Glucose.: 283 mg/dL (23 Mar 2023 21:23)  POCT Blood Glucose.: 276 mg/dL (23 Mar 2023 16:58)  POCT Blood Glucose.: 240 mg/dL (23 Mar 2023 11:30)    Blood Gas Source Venous: Venous (03-22-23 @ 06:23)      MICROBIOLOGY:     RADIOLOGY:  [ ] Reviewed and interpreted by me    Point of Care Ultrasound Findings:    PFT:    EKG:

## 2023-03-24 NOTE — PROGRESS NOTE ADULT - ASSESSMENT
77 year old female with a PMHx of HTN, HLD, CAD s/p PCI in 2018, DM2, recent admission and newly diagnosed with ovarian cancer c/b peritoneal carcinomatosis (not on active chemo/radiation) who presents from outpatient office for hypoxia despite large volume paracentesis performed as outpatient with     CT chest with mild emphysema, complete atelectasis of the right middle and lower lobe. few subpleural nodular densities in the posterior segment of the left lower lobe Large right pleural effusion, increased in size from prior exam. No left pleural effusion. No mediastinal or hilar lymphadenopathy. Unchanged prominent 1.7 cm epicardial lymph node.Small volume perihepatic ascites, decreased from prior exam. Chronic left-sided eighth and ninth rib fractures. Degenerative changes.    #recommendations  likely malignant in nature, and also related to abdominal ascites however given persistent hypoxemia and respiratory distress despite paracentesis s/p serosang 1.8 L thoracentesis  exudative non infectious: malignant cells predominant  post CXR with persistent however decreased pleural effusion without PTX  persistence in pleural likely secondary to fluid translocation from ascites  unable to obtain pleurx due to insurance issues however patient knows to follow up with pulmnology in [place]  found to have acute DVT in the setting of cancer would favor treatment and should continue on discharge  should obtain ambulatory pulse ox on RA prior to discharge  son likely to take patient directly to hospital if patient has any issues during transport  titrate oxygen to O2 >88%, use humidified oxygen  incentive spirometry, OOB to chair, PT/OT  aspiration precautions as necessary  DVT ppx as tolerated  airway clearance as necessary

## 2023-03-24 NOTE — PROGRESS NOTE ADULT - ATTENDING COMMENTS
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 Pt now s/p diagnostic and therapeutic thoracentesis 3/22 with 1800cc fluid removed. Following procedure pt was weaned off HFNC to RA and clinically feels much better.     -Discussed at length with pt and family. Initially planned for inpatient IPC placement today but pt would not have appropriate insurance coverage for VNS or supplies which would be required. Pt currently stable from pulmonary perspective and does not need urgent IPC. She is aware that if she becomes dyspneic or has recurrence of sx she should seek medical evaluation right away to look for pleural fluid reaccumulation  -Pt's son plans on moving pt to [place] with him this weekend, where he has arranged for further medical care at [facility] including evaluation by pulmonologist and oncologist   -No pulmonary c/i to hospital discharge
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 Pt now s/p diagnostic and therapeutic thoracentesis 3/22 with 1800cc fluid removed. Following procedure pt was weaned off HFNC to NC O2 supplementation and clinically feels much better.     -Discussed at length with pt and son today regarding possible benefit of IPC placement. Pt and son would like to have procedure done prior to d/c. Pt can be placed on schedule for tomorrow. Would keep pt NPO after MN for possible Pleurx placement with IP team  -Discussed with CM team 2/2 concern for appropriate insurance coverage of VNS services and Pleurx catheter supply delivery following IPC placement. Pt with [place] Medicare at this time (until 4/1) and would unlikely be able to receive needed services and supplies if she moves to [place] prior to new insurance coverage on 4/1. Will need to qualify prior to Pleurx insertion  -Discussed with pt' son that she will also need a pulmonologist to monitor IPC in [place]. Son planning on transferring care to [facility] and will provide name/number of pulmonologist so that we can also discuss with him prior to d/c

## 2023-03-24 NOTE — PHARMACOTHERAPY INTERVENTION NOTE - COMMENTS
Prescription for Eliquis was sent to [facility]. Patient's copay is $3 with her insurance.     Derick (Mohsen Simmons  Pharmacist  Available on Microsoft Teams

## 2023-03-25 ENCOUNTER — TRANSCRIPTION ENCOUNTER (OUTPATIENT)
Age: 78
End: 2023-03-25

## 2023-03-25 VITALS
DIASTOLIC BLOOD PRESSURE: 59 MMHG | TEMPERATURE: 98 F | HEART RATE: 78 BPM | OXYGEN SATURATION: 91 % | RESPIRATION RATE: 18 BRPM | SYSTOLIC BLOOD PRESSURE: 131 MMHG

## 2023-03-25 LAB
BASOPHILS # BLD AUTO: 0.06 K/UL — SIGNIFICANT CHANGE UP (ref 0–0.2)
BASOPHILS NFR BLD AUTO: 0.6 % — SIGNIFICANT CHANGE UP (ref 0–2)
EOSINOPHIL # BLD AUTO: 0.34 K/UL — SIGNIFICANT CHANGE UP (ref 0–0.5)
EOSINOPHIL NFR BLD AUTO: 3.6 % — SIGNIFICANT CHANGE UP (ref 0–6)
GLUCOSE BLDC GLUCOMTR-MCNC: 199 MG/DL — HIGH (ref 70–99)
GLUCOSE BLDC GLUCOMTR-MCNC: 225 MG/DL — HIGH (ref 70–99)
HCT VFR BLD CALC: 26.2 % — LOW (ref 34.5–45)
HGB BLD-MCNC: 8.2 G/DL — LOW (ref 11.5–15.5)
IMM GRANULOCYTES NFR BLD AUTO: 0.4 % — SIGNIFICANT CHANGE UP (ref 0–0.9)
LYMPHOCYTES # BLD AUTO: 1.59 K/UL — SIGNIFICANT CHANGE UP (ref 1–3.3)
LYMPHOCYTES # BLD AUTO: 16.9 % — SIGNIFICANT CHANGE UP (ref 13–44)
MCHC RBC-ENTMCNC: 23.8 PG — LOW (ref 27–34)
MCHC RBC-ENTMCNC: 31.3 GM/DL — LOW (ref 32–36)
MCV RBC AUTO: 75.9 FL — LOW (ref 80–100)
MONOCYTES # BLD AUTO: 0.64 K/UL — SIGNIFICANT CHANGE UP (ref 0–0.9)
MONOCYTES NFR BLD AUTO: 6.8 % — SIGNIFICANT CHANGE UP (ref 2–14)
NEUTROPHILS # BLD AUTO: 6.76 K/UL — SIGNIFICANT CHANGE UP (ref 1.8–7.4)
NEUTROPHILS NFR BLD AUTO: 71.7 % — SIGNIFICANT CHANGE UP (ref 43–77)
NRBC # BLD: 0 /100 WBCS — SIGNIFICANT CHANGE UP (ref 0–0)
PLATELET # BLD AUTO: 519 K/UL — HIGH (ref 150–400)
RBC # BLD: 3.45 M/UL — LOW (ref 3.8–5.2)
RBC # FLD: 19.9 % — HIGH (ref 10.3–14.5)
WBC # BLD: 9.43 K/UL — SIGNIFICANT CHANGE UP (ref 3.8–10.5)
WBC # FLD AUTO: 9.43 K/UL — SIGNIFICANT CHANGE UP (ref 3.8–10.5)

## 2023-03-25 PROCEDURE — 93970 EXTREMITY STUDY: CPT

## 2023-03-25 PROCEDURE — 82330 ASSAY OF CALCIUM: CPT

## 2023-03-25 PROCEDURE — 0225U NFCT DS DNA&RNA 21 SARSCOV2: CPT

## 2023-03-25 PROCEDURE — 87641 MR-STAPH DNA AMP PROBE: CPT

## 2023-03-25 PROCEDURE — 87075 CULTR BACTERIA EXCEPT BLOOD: CPT

## 2023-03-25 PROCEDURE — 82945 GLUCOSE OTHER FLUID: CPT

## 2023-03-25 PROCEDURE — 82565 ASSAY OF CREATININE: CPT

## 2023-03-25 PROCEDURE — 87102 FUNGUS ISOLATION CULTURE: CPT

## 2023-03-25 PROCEDURE — 99291 CRITICAL CARE FIRST HOUR: CPT | Mod: 25

## 2023-03-25 PROCEDURE — 82042 OTHER SOURCE ALBUMIN QUAN EA: CPT

## 2023-03-25 PROCEDURE — 93005 ELECTROCARDIOGRAM TRACING: CPT

## 2023-03-25 PROCEDURE — 87015 SPECIMEN INFECT AGNT CONCNTJ: CPT

## 2023-03-25 PROCEDURE — 84295 ASSAY OF SERUM SODIUM: CPT

## 2023-03-25 PROCEDURE — 94660 CPAP INITIATION&MGMT: CPT

## 2023-03-25 PROCEDURE — 89051 BODY FLUID CELL COUNT: CPT

## 2023-03-25 PROCEDURE — 87205 SMEAR GRAM STAIN: CPT

## 2023-03-25 PROCEDURE — 80048 BASIC METABOLIC PNL TOTAL CA: CPT

## 2023-03-25 PROCEDURE — 85014 HEMATOCRIT: CPT

## 2023-03-25 PROCEDURE — 83986 ASSAY PH BODY FLUID NOS: CPT

## 2023-03-25 PROCEDURE — 88185 FLOWCYTOMETRY/TC ADD-ON: CPT

## 2023-03-25 PROCEDURE — 86850 RBC ANTIBODY SCREEN: CPT

## 2023-03-25 PROCEDURE — 88305 TISSUE EXAM BY PATHOLOGIST: CPT

## 2023-03-25 PROCEDURE — 84311 SPECTROPHOTOMETRY: CPT

## 2023-03-25 PROCEDURE — 87040 BLOOD CULTURE FOR BACTERIA: CPT

## 2023-03-25 PROCEDURE — 85027 COMPLETE CBC AUTOMATED: CPT

## 2023-03-25 PROCEDURE — 99239 HOSP IP/OBS DSCHRG MGMT >30: CPT

## 2023-03-25 PROCEDURE — 71045 X-RAY EXAM CHEST 1 VIEW: CPT

## 2023-03-25 PROCEDURE — 83615 LACTATE (LD) (LDH) ENZYME: CPT

## 2023-03-25 PROCEDURE — 84132 ASSAY OF SERUM POTASSIUM: CPT

## 2023-03-25 PROCEDURE — 85610 PROTHROMBIN TIME: CPT

## 2023-03-25 PROCEDURE — 84484 ASSAY OF TROPONIN QUANT: CPT

## 2023-03-25 PROCEDURE — 83880 ASSAY OF NATRIURETIC PEPTIDE: CPT

## 2023-03-25 PROCEDURE — 85018 HEMOGLOBIN: CPT

## 2023-03-25 PROCEDURE — 82962 GLUCOSE BLOOD TEST: CPT

## 2023-03-25 PROCEDURE — 86900 BLOOD TYPING SEROLOGIC ABO: CPT

## 2023-03-25 PROCEDURE — U0005: CPT

## 2023-03-25 PROCEDURE — 82435 ASSAY OF BLOOD CHLORIDE: CPT

## 2023-03-25 PROCEDURE — 88112 CYTOPATH CELL ENHANCE TECH: CPT

## 2023-03-25 PROCEDURE — 85025 COMPLETE CBC W/AUTO DIFF WBC: CPT

## 2023-03-25 PROCEDURE — 71250 CT THORAX DX C-: CPT | Mod: MA

## 2023-03-25 PROCEDURE — U0003: CPT

## 2023-03-25 PROCEDURE — 82947 ASSAY GLUCOSE BLOOD QUANT: CPT

## 2023-03-25 PROCEDURE — 84100 ASSAY OF PHOSPHORUS: CPT

## 2023-03-25 PROCEDURE — 96374 THER/PROPH/DIAG INJ IV PUSH: CPT

## 2023-03-25 PROCEDURE — 80053 COMPREHEN METABOLIC PANEL: CPT

## 2023-03-25 PROCEDURE — 87206 SMEAR FLUORESCENT/ACID STAI: CPT

## 2023-03-25 PROCEDURE — 85730 THROMBOPLASTIN TIME PARTIAL: CPT

## 2023-03-25 PROCEDURE — 87070 CULTURE OTHR SPECIMN AEROBIC: CPT

## 2023-03-25 PROCEDURE — 83735 ASSAY OF MAGNESIUM: CPT

## 2023-03-25 PROCEDURE — 87116 MYCOBACTERIA CULTURE: CPT

## 2023-03-25 PROCEDURE — 87640 STAPH A DNA AMP PROBE: CPT

## 2023-03-25 PROCEDURE — 82803 BLOOD GASES ANY COMBINATION: CPT

## 2023-03-25 PROCEDURE — 84157 ASSAY OF PROTEIN OTHER: CPT

## 2023-03-25 PROCEDURE — 83605 ASSAY OF LACTIC ACID: CPT

## 2023-03-25 PROCEDURE — 36415 COLL VENOUS BLD VENIPUNCTURE: CPT

## 2023-03-25 PROCEDURE — 96375 TX/PRO/DX INJ NEW DRUG ADDON: CPT

## 2023-03-25 PROCEDURE — 93306 TTE W/DOPPLER COMPLETE: CPT

## 2023-03-25 PROCEDURE — 86901 BLOOD TYPING SEROLOGIC RH(D): CPT

## 2023-03-25 RX ORDER — ISOSORBIDE MONONITRATE 60 MG/1
1 TABLET, EXTENDED RELEASE ORAL
Qty: 0 | Refills: 0 | DISCHARGE

## 2023-03-25 RX ORDER — METOPROLOL TARTRATE 50 MG
1 TABLET ORAL
Qty: 0 | Refills: 0 | DISCHARGE

## 2023-03-25 RX ORDER — AMLODIPINE BESYLATE 2.5 MG/1
1 TABLET ORAL
Qty: 0 | Refills: 0 | DISCHARGE

## 2023-03-25 RX ADMIN — POLYETHYLENE GLYCOL 3350 17 GRAM(S): 17 POWDER, FOR SOLUTION ORAL at 12:59

## 2023-03-25 RX ADMIN — APIXABAN 10 MILLIGRAM(S): 2.5 TABLET, FILM COATED ORAL at 05:52

## 2023-03-25 RX ADMIN — CHLORHEXIDINE GLUCONATE 1 APPLICATION(S): 213 SOLUTION TOPICAL at 05:52

## 2023-03-25 RX ADMIN — Medication 2: at 12:18

## 2023-03-25 RX ADMIN — Medication 81 MILLIGRAM(S): at 12:59

## 2023-03-25 RX ADMIN — Medication 1: at 07:36

## 2023-03-25 NOTE — PROGRESS NOTE ADULT - PROBLEM SELECTOR PLAN 1
Secondary to large right sided pleural effusion.  Pt has no prior history of thoracentesis.   - s/p BiPAP in the ED but discontinued after she developed hypotension while on BiPAP  - currently on low HFNC setting  - pulm consulted for thoracentesis: care discussed with Dr. Mata - will monitor her symptoms after thoracentesis and determine if pleurX is warranted  - wean off HFNC after the procedure  - s/p vanc and cefepime empirically. Can hold off on further abx for now as no signs/symptoms of infection at this time  - obtain TTE  - obtain lower extremity duplex to r/o DVT
Secondary to large right sided pleural effusion.  - initially requiring HFNC but s/p thoracentesis, weaned off to NC 3L  - s/p diagnostic/therapeutic thoracentesis 3.22  - appreciate pulm recs: will monitor symptoms closely and discuss need for pleurX catheter placement  - TTE with normal LV function  - if hypoxemia does not improve, then will consider CTA to rule out PE  - LE doppler pending
Secondary to large right sided pleural effusion.  - initially requiring HFNC but s/p thoracentesis, now on RA  - s/p diagnostic/therapeutic thoracentesis 3.22  - appreciate pulm recs: patient was initially scheduled for PleurX catheter placement but due to insurance coverage issues, cancelled for now.   - TTE with normal LV function  - LE doppler positive with acute dvt: started on eliquis  - advised patient and sons to bring her back to the hospital if develops dyspnea
Secondary to large right sided pleural effusion.  - initially requiring HFNC but s/p thoracentesis, now on RA  - s/p diagnostic/therapeutic thoracentesis 3.22  - appreciate pulm recs: patient was initially scheduled for PleurX catheter placement but due to insurance coverage issues, cancelled for now.   - TTE with normal LV function  - LE doppler positive with acute dvt

## 2023-03-25 NOTE — PROGRESS NOTE ADULT - NSPROGADDITIONALINFOA_GEN_ALL_CORE
above plans discussed with LIAN Pulido  updated both jose ramon Wilson and Miles over the phone
above plans discussed with Dr. Myron Aquino over the phone, in agreement with dc planning  all questions and concerns were addressed    41 minutes spent on discharge process
above plans discussed with LIAN Wilson over the phone  dc planning in AM
above plans discussed with LIAN Pulido  updated both jose ramon Wilson and Miles over the phone

## 2023-03-25 NOTE — DISCHARGE NOTE PROVIDER - NSDCCPTREATMENT_GEN_ALL_CORE_FT
PRINCIPAL PROCEDURE  Procedure: Venous duplex scan RLE  Findings and Treatment: IMPRESSION:  Acute below the knee DVT confined to the soleal vein of the right calf.  Examination findings were conveyed to physician's assistant Nando by   vascular technologist Rima at 1610 hours on 3/23/2023 with read back.  --- End of Report --

## 2023-03-25 NOTE — DISCHARGE NOTE PROVIDER - NSDCCPCAREPLAN_GEN_ALL_CORE_FT
PRINCIPAL DISCHARGE DIAGNOSIS  Diagnosis: SOB (shortness of breath)  Assessment and Plan of Treatment: You presented with shortness of breath due to fluid aroudn your lung. This was drained with a procedure called a thoracentesis. The fluid is likely secondary to your underlying ovarian cancer. Please follow up with your oncologist for further treatment. Return to the hospital if you develop shortenss of breath or chest pain.      SECONDARY DISCHARGE DIAGNOSES  Diagnosis: Acute deep vein thrombosis (DVT)  Assessment and Plan of Treatment: You were found to have a blood clot in your right calf. You were started on a medication called eliquis. Please continue to take this and follow up with your primary care doctor and oncologist. Return to the hospital if you develop worsening shortness of breath.

## 2023-03-25 NOTE — PROGRESS NOTE ADULT - PROBLEM SELECTOR PLAN 5
- takes ademolog sliding scale at home  - continue home low dose admelog sliding scale qAC and qHS
- takes ademolog sliding scale at home  - continue home low dose admelog sliding scale qAC and qHS
While in the ED, pt developed hypotension while on BiPAP  - pt unsure of home dose of blood pressure medications. Will hold off
While in the ED, pt developed hypotension while on BiPAP  - pt unsure of home dose of blood pressure medications. Will hold off anyway given development of low blood pressures in the ED

## 2023-03-25 NOTE — DISCHARGE NOTE PROVIDER - NSDCFUSCHEDAPPT_GEN_ALL_CORE_FT
Nirav Physician Ofe  ULTRASND  [facility]  Scheduled Appointment: 03/29/2023    [facility]  GYNON[facility]  Scheduled Appointment: 04/04/2023

## 2023-03-25 NOTE — DISCHARGE NOTE PROVIDER - NSDCMRMEDTOKEN_GEN_ALL_CORE_FT
aspirin 81 mg oral delayed release tablet: 1 tab(s) orally once a day  atorvastatin 80 mg oral tablet: 1 tab(s) orally once a day (at bedtime)  Eliquis Starter Pack for Treatment of DVT and PE 5 mg oral tablet: 2 tab(s) orally 2 times a day x 6 days, then 1 tab orally 2 times a day  NovoLOG 100 units/mL injectable solution: 10 unit(s) injectable 3 times a day  polyethylene glycol 3350 oral powder for reconstitution: 17 gram(s) orally once a day  senna leaf extract oral tablet: 2 tab(s) orally once a day (at bedtime)  Vitamin D3 1250 mcg (50,000 intl units) oral capsule: 1 cap(s) orally once a week

## 2023-03-25 NOTE — DISCHARGE NOTE NURSING/CASE MANAGEMENT/SOCIAL WORK - PATIENT PORTAL LINK FT
You can access the FollowMyHealth Patient Portal offered by [facility] by registering at the following website: [email or web address]. By joining [facility]’s FollowMyHealth portal, you will also be able to view your health information using other applications (apps) compatible with our system.

## 2023-03-25 NOTE — PROGRESS NOTE ADULT - PROBLEM SELECTOR PLAN 6
at baseline  - continue to monitor  - goal hgb >7.0
at baseline  - continue to monitor  - goal hgb >7.0
- takes ademolog sliding scale at home  - continue home low dose admelog sliding scale qAC and qHS
- takes ademolog sliding scale at home  - continue home low dose admelog sliding scale qAC and qHS

## 2023-03-25 NOTE — PROGRESS NOTE ADULT - PROBLEM SELECTOR PLAN 4
While in the ED, pt developed hypotension while on BiPAP  - pt unsure of home dose of blood pressure medications. Will hold off anyway given development of low blood pressures in the ED
While in the ED, pt developed hypotension while on BiPAP  - pt unsure of home dose of blood pressure medications. Will hold off anyway given development of low blood pressures in the ED
Creatinine in March 10th during recent hospitalization was 5.44, now improved to 1.86.  - unclear etiology. YAN versus CKD?  - strict I/O  - caution with nephrotoxic agents
Creatinine in March 10th during recent hospitalization was 5.44, now improved to 1.86.  - unclear etiology. YAN versus CKD?  - strict I/O  - caution with nephrotoxic agents

## 2023-03-25 NOTE — PROGRESS NOTE ADULT - PROBLEM SELECTOR PLAN 3
Creatinine in March 10th during recent hospitalization was 5.44, now improved to 1.86.  - unclear etiology. YAN versus CKD?  - strict I/O  - caution with nephrotoxic agents
Creatinine in March 10th during recent hospitalization was 5.44, now improved to 1.86.  - unclear etiology. YAN versus CKD?  - strict I/O  - caution with nephrotoxic agents
C/b peritoneal carcinomatosis. Hx of two large-volume paracentesis, last one done on 3/21 prior to arrival to the ED (4L, per outpatient notes). Cytopathology from ascites fluid on 3/10/23 was positive for malignant cells.  Pt is not yet on disease-targeted treatment. She plans to move to [place] this weekend to be closer to her son and intends to initiate treatment there  - continue outpatient follow up  - saw Dr. Hodges here: plan to transition all care to [place]  - appreciate gyn/onc recs: recommend IR guided biopsy but will discuss with patient/sons re: further workups in [place] vs[place]
C/b peritoneal carcinomatosis. Hx of two large-volume paracentesis, last one done on 3/21 prior to arrival to the ED (4L, per outpatient notes). Cytopathology from ascites fluid on 3/10/23 was positive for malignant cells.  Pt is not yet on disease-targeted treatment. She plans to move to [place] this weekend to be closer to her son and intends to initiate treatment there  - continue outpatient follow up  - saw Dr. Hodges here: plan to transition all care to [place]  - appreciate gyn/onc recs: recommend IR guided biopsy but will discuss with patient/sons re: further workups in [place] vs[place]

## 2023-03-25 NOTE — PROGRESS NOTE ADULT - REASON FOR ADMISSION
hypoxic respiratory failure secondary to large pleural effusion

## 2023-03-25 NOTE — PROGRESS NOTE ADULT - PROBLEM SELECTOR PLAN 8
- DVT ppx: HSQ  - GI ppx: none  - Diet: consistent carb diet  - Code status: Full Code
- DVT ppx: HSQ  - GI ppx: none  - Diet: consistent carb diet  - Code status: Full Code

## 2023-03-25 NOTE — DISCHARGE NOTE PROVIDER - CARE PROVIDER_API CALL
Sparkle Hernandez)  OBSGYN  Oncology  [address] [facility] [address], [address] [place] [address]  Phone: [phone]  Fax: [phone]  Established Patient  Follow Up Time: 1 week

## 2023-03-25 NOTE — PROGRESS NOTE ADULT - PROBLEM SELECTOR PLAN 7
at baseline  - continue to monitor  - goal hgb >7.0
- DVT ppx: hold for now, pending thoracentesis. Please start heparin subq post procedure  - GI ppx: none  - Diet: consistent carb diet  - Code status: Full Code
- DVT ppx: HSQ  - GI ppx: none  - Diet: consistent carb diet  - Code status: Full Code
at baseline  - continue to monitor  - goal hgb >7.0

## 2023-03-27 LAB
CULTURE RESULTS: SIGNIFICANT CHANGE UP
NON-GYNECOLOGICAL CYTOLOGY STUDY: SIGNIFICANT CHANGE UP
SPECIMEN SOURCE: SIGNIFICANT CHANGE UP

## 2023-03-28 ENCOUNTER — NON-APPOINTMENT (OUTPATIENT)
Age: 78
End: 2023-03-28

## 2023-03-29 ENCOUNTER — APPOINTMENT (OUTPATIENT)
Dept: ULTRASOUND IMAGING | Facility: IMAGING CENTER | Age: 78
End: 2023-03-29

## 2023-04-04 ENCOUNTER — APPOINTMENT (OUTPATIENT)
Dept: GYNECOLOGIC ONCOLOGY | Facility: CLINIC | Age: 78
End: 2023-04-04
Payer: MEDICARE

## 2023-04-04 DIAGNOSIS — R18.8 OTHER ASCITES: ICD-10-CM

## 2023-04-04 DIAGNOSIS — C80.0 DISSEMINATED MALIGNANT NEOPLASM, UNSPECIFIED: ICD-10-CM

## 2023-04-04 PROCEDURE — 99447 NTRPROF PH1/NTRNET/EHR 11-20: CPT

## 2023-04-22 LAB
CULTURE RESULTS: SIGNIFICANT CHANGE UP
SPECIMEN SOURCE: SIGNIFICANT CHANGE UP

## 2023-05-10 LAB
CULTURE RESULTS: SIGNIFICANT CHANGE UP
SPECIMEN SOURCE: SIGNIFICANT CHANGE UP

## 2023-07-22 NOTE — PROGRESS NOTE ADULT - PROBLEM SELECTOR PLAN 2
C/b peritoneal carcinomatosis. Hx of two large-volume paracentesis, last one done on 3/21 prior to arrival to the ED (4L, per outpatient notes). Cytopathology from ascites fluid on 3/10/23 was positive for malignant cells.  Pt is not yet on disease-targeted treatment. She plans to move to [place] this weekend to be closer to her son and intends to initiate treatment there  - continue outpatient follow up  - saw Dr. Hodges here: plan to transition all care to [place]
C/b peritoneal carcinomatosis. Hx of two large-volume paracentesis, last one done on 3/21 prior to arrival to the ED (4L, per outpatient notes). Cytopathology from ascites fluid on 3/10/23 was positive for malignant cells.  Pt is not yet on disease-targeted treatment. She plans to move to [place] this weekend to be closer to her son and intends to initiate treatment there  - continue outpatient follow up  - saw Dr. Hodges here: plan to transition all care to [place]  - appreciate gyn/onc recs: recommend IR guided biopsy but will discuss with patient/sons re: further workups in [place] vs[place]
Acute below the knee DVT confined to the soleal vein of the right calf.  - although below the knee DVT, given her extensive tumor burden, will start on systemic AC  - pt does not feel comfortable with lovenox injection  - will start eliquis 10mg BID x 7 days then 5mg BID  - repeat LE doppler as outpatient
Acute below the knee DVT confined to the soleal vein of the right calf.  - although below the knee DVT, given her extensive tumor burden, will start on systemic AC  - pt does not feel comfortable with lovenox injection  - will start eliquis 10mg BID x 7 days then 5mg BID  - repeat LE doppler as outpatient
Satisfactory

## 2023-08-09 NOTE — DISCHARGE NOTE PROVIDER - HOSPITAL COURSE
Called and spoke with Patient regarding results of imaging. Repeat test in 1 year.    77 year old female with a PMHx of HTN, HLD, CAD s/p PCI in 2018, DM2, recent admission and newly diagnosed with ovarian cancer c/b peritoneal carcinomatosis (not on active chemo/radiation) who presents from outpatient office for hypoxia. The patient states she has been experiencing gradually worsening dyspnea on exertion and occasionally at rest over the past several weeks. There is no associated cough, fevers, chills, or chest pain. She was recently seen at [facility], diagnosed with metastatic ovarian cancer with peritoneal carcionmatosis and YAN. She had a large-volume paracentesis on 3/10 where 5.2L were removed. Cytopathology was positive for malignant cells. Since discharge, she had a second paracentesis just prior to arrival today where 4L of ascites fluid was removed. Pt states her shortness of breath improved after today's paracentesis; however, she was still found to be hypoxic and was brought to the ED for further evaluation.     ED course: the patient was hypoxic to the 80s on RA, improved to >95% with 8L NRB. She was subsequently placed on BiPAP for respiratory distress -> developed hypotension to the 100s systolic -> given  cc and placed on HFNC. ED Pocus showed large right pleural effusion, small left pleural effusion and collapsible IVC. She was treated with cefepime and vancomycin empirically. Hypokalemia to 6.1, given insulin, dextrose, calcium gluconate, and lokelma.    Hospital Course:   Patient found to have  large R sided pleural effusion. Underwent diagnostic/ therapeutic thoracentesis showing malignant cells. Oxygen requirements improved following thoracentesis. Initially planned for inpatient IPC, however, given insurance status this was deferred. S  he was also found to have YAN on admission, which improved.   She was found to have R soleal vein DVT, started on eliquis given extensive tumor burden.  She is being discharged to start outpatient chemotherapy in [place].

## 2023-08-14 NOTE — PATIENT PROFILE ADULT. - NS PRO PT RIGHT SUPPORT PERSON

## 2023-08-31 NOTE — DISCHARGE NOTE PROVIDER - DISCHARGE SERVICE FOR PATIENT
What Is The Reason For Today's Visit?: Full Body Skin Examination What Is The Reason For Today's Visit? (Being Monitored For X): concerning skin lesions on an annual basis on the discharge service for the patient. I have reviewed and made amendments to the documentation where necessary.

## 2023-12-14 NOTE — ED ADULT TRIAGE NOTE - GLASGOW COMA SCALE: EYE OPENING, MLM
Called and spoke w/pt's son and pt on speaker phone. They were told by Discharge Nurse that pt would not be on antibiotics PO. When they picked up scripts, pt was given Keflex for 5 days. Should they start these antibiotics? Update: There has been no bleeding noted to dsg since her slide back into car seat. She is having some tingling-block may be beginning to wear off as she still cannot move fingers. Hand is red and warm. Arm remains in sling and on pillows. They wanted to know if you feel she needs to be seen again since the PO slide back into car seat? Please review and advise. Thank you. (E4) spontaneous

## 2024-01-09 NOTE — ED ADULT NURSE NOTE - CHPI ED NUR TIMING2
Called and spoke with the patient. She is aware Dr. Valdes had an address change for her appointment and will see her in the [place] location. Address was given  
gradual onset/worsening

## 2025-07-07 NOTE — DISCHARGE NOTE PROVIDER - PROVIDER TOKENS
FREE:[LAST:[.],FIRST:[.],PHONE:[(   )    -],FAX:[(   )    -],ADDRESS:[Please follow up with Primary care doctor within 1-2 days after being discharged from the hospital],FOLLOWUP:[1-3 days]],FREE:[LAST:[.],FIRST:[.],PHONE:[(   )    -],FAX:[(   )    -],ADDRESS:[-patient will need to f/u with heme/onc OP Dr. Robert CARRANZA/Nirav [phone]  -will need GYN/ ONC F/u Dr. Sujata Gastelum  [phone]],FOLLOWUP:[1-3 days]] No